# Patient Record
Sex: FEMALE | Race: WHITE | NOT HISPANIC OR LATINO
[De-identification: names, ages, dates, MRNs, and addresses within clinical notes are randomized per-mention and may not be internally consistent; named-entity substitution may affect disease eponyms.]

---

## 2017-03-07 PROBLEM — Z00.00 ENCOUNTER FOR PREVENTIVE HEALTH EXAMINATION: Status: ACTIVE | Noted: 2017-03-07

## 2017-03-14 ENCOUNTER — APPOINTMENT (OUTPATIENT)
Dept: BREAST CENTER | Facility: CLINIC | Age: 42
End: 2017-03-14

## 2017-11-28 ENCOUNTER — OUTPATIENT (OUTPATIENT)
Dept: OUTPATIENT SERVICES | Facility: HOSPITAL | Age: 42
LOS: 1 days | Discharge: HOME | End: 2017-11-28

## 2017-11-28 DIAGNOSIS — Z01.818 ENCOUNTER FOR OTHER PREPROCEDURAL EXAMINATION: ICD-10-CM

## 2017-12-12 ENCOUNTER — OUTPATIENT (OUTPATIENT)
Dept: OUTPATIENT SERVICES | Facility: HOSPITAL | Age: 42
LOS: 1 days | Discharge: HOME | End: 2017-12-12

## 2017-12-19 DIAGNOSIS — D25.9 LEIOMYOMA OF UTERUS, UNSPECIFIED: ICD-10-CM

## 2018-06-12 ENCOUNTER — OUTPATIENT (OUTPATIENT)
Dept: OUTPATIENT SERVICES | Facility: HOSPITAL | Age: 43
LOS: 1 days | Discharge: HOME | End: 2018-06-12

## 2018-06-12 DIAGNOSIS — M25.9 JOINT DISORDER, UNSPECIFIED: ICD-10-CM

## 2018-06-22 ENCOUNTER — OUTPATIENT (OUTPATIENT)
Dept: OUTPATIENT SERVICES | Facility: HOSPITAL | Age: 43
LOS: 1 days | Discharge: HOME | End: 2018-06-22

## 2018-06-22 ENCOUNTER — APPOINTMENT (OUTPATIENT)
Dept: GYNECOLOGIC ONCOLOGY | Facility: CLINIC | Age: 43
End: 2018-06-22

## 2018-06-22 VITALS
SYSTOLIC BLOOD PRESSURE: 140 MMHG | WEIGHT: 245 LBS | TEMPERATURE: 96.7 F | HEIGHT: 65 IN | RESPIRATION RATE: 14 BRPM | BODY MASS INDEX: 40.82 KG/M2 | DIASTOLIC BLOOD PRESSURE: 90 MMHG | HEART RATE: 78 BPM

## 2018-06-25 DIAGNOSIS — D25.9 LEIOMYOMA OF UTERUS, UNSPECIFIED: ICD-10-CM

## 2018-07-10 ENCOUNTER — OUTPATIENT (OUTPATIENT)
Dept: OUTPATIENT SERVICES | Facility: HOSPITAL | Age: 43
LOS: 1 days | Discharge: HOME | End: 2018-07-10

## 2018-07-10 VITALS
WEIGHT: 249.12 LBS | OXYGEN SATURATION: 96 % | HEIGHT: 65 IN | RESPIRATION RATE: 18 BRPM | HEART RATE: 100 BPM | TEMPERATURE: 98 F | DIASTOLIC BLOOD PRESSURE: 82 MMHG | SYSTOLIC BLOOD PRESSURE: 130 MMHG

## 2018-07-10 DIAGNOSIS — Z90.12 ACQUIRED ABSENCE OF LEFT BREAST AND NIPPLE: Chronic | ICD-10-CM

## 2018-07-10 DIAGNOSIS — Z01.818 ENCOUNTER FOR OTHER PREPROCEDURAL EXAMINATION: ICD-10-CM

## 2018-07-10 DIAGNOSIS — D25.9 LEIOMYOMA OF UTERUS, UNSPECIFIED: ICD-10-CM

## 2018-07-10 DIAGNOSIS — Z98.890 OTHER SPECIFIED POSTPROCEDURAL STATES: Chronic | ICD-10-CM

## 2018-07-10 LAB
ALBUMIN SERPL ELPH-MCNC: 4.3 G/DL — SIGNIFICANT CHANGE UP (ref 3.5–5.2)
ALP SERPL-CCNC: 95 U/L — SIGNIFICANT CHANGE UP (ref 30–115)
ALT FLD-CCNC: 21 U/L — SIGNIFICANT CHANGE UP (ref 0–41)
ANION GAP SERPL CALC-SCNC: 15 MMOL/L — HIGH (ref 7–14)
APPEARANCE UR: (no result)
APTT BLD: 40.9 SEC — HIGH (ref 27–39.2)
AST SERPL-CCNC: 17 U/L — SIGNIFICANT CHANGE UP (ref 0–41)
BASOPHILS # BLD AUTO: 0.02 K/UL — SIGNIFICANT CHANGE UP (ref 0–0.2)
BASOPHILS NFR BLD AUTO: 0.3 % — SIGNIFICANT CHANGE UP (ref 0–1)
BILIRUB SERPL-MCNC: 0.3 MG/DL — SIGNIFICANT CHANGE UP (ref 0.2–1.2)
BILIRUB UR-MCNC: (no result)
BLD GP AB SCN SERPL QL: SIGNIFICANT CHANGE UP
BUN SERPL-MCNC: 21 MG/DL — HIGH (ref 10–20)
CALCIUM SERPL-MCNC: 10.7 MG/DL — HIGH (ref 8.5–10.1)
CHLORIDE SERPL-SCNC: 105 MMOL/L — SIGNIFICANT CHANGE UP (ref 98–110)
CO2 SERPL-SCNC: 24 MMOL/L — SIGNIFICANT CHANGE UP (ref 17–32)
COLOR SPEC: (no result)
CREAT SERPL-MCNC: 0.7 MG/DL — SIGNIFICANT CHANGE UP (ref 0.7–1.5)
DIFF PNL FLD: (no result)
EOSINOPHIL # BLD AUTO: 0.06 K/UL — SIGNIFICANT CHANGE UP (ref 0–0.7)
EOSINOPHIL NFR BLD AUTO: 1 % — SIGNIFICANT CHANGE UP (ref 0–8)
EPI CELLS # UR: (no result) /HPF
GLUCOSE SERPL-MCNC: 67 MG/DL — LOW (ref 70–99)
GLUCOSE UR QL: NEGATIVE MG/DL — SIGNIFICANT CHANGE UP
HCT VFR BLD CALC: 48.2 % — HIGH (ref 37–47)
HGB BLD-MCNC: 15.7 G/DL — SIGNIFICANT CHANGE UP (ref 12–16)
IMM GRANULOCYTES NFR BLD AUTO: 0.3 % — SIGNIFICANT CHANGE UP (ref 0.1–0.3)
INR BLD: 1.07 RATIO — SIGNIFICANT CHANGE UP (ref 0.65–1.3)
KETONES UR-MCNC: (no result)
LEUKOCYTE ESTERASE UR-ACNC: (no result)
LYMPHOCYTES # BLD AUTO: 1.03 K/UL — LOW (ref 1.2–3.4)
LYMPHOCYTES # BLD AUTO: 17 % — LOW (ref 20.5–51.1)
MCHC RBC-ENTMCNC: 28.8 PG — SIGNIFICANT CHANGE UP (ref 27–31)
MCHC RBC-ENTMCNC: 32.6 G/DL — SIGNIFICANT CHANGE UP (ref 32–37)
MCV RBC AUTO: 88.4 FL — SIGNIFICANT CHANGE UP (ref 81–99)
MONOCYTES # BLD AUTO: 0.48 K/UL — SIGNIFICANT CHANGE UP (ref 0.1–0.6)
MONOCYTES NFR BLD AUTO: 7.9 % — SIGNIFICANT CHANGE UP (ref 1.7–9.3)
NEUTROPHILS # BLD AUTO: 4.44 K/UL — SIGNIFICANT CHANGE UP (ref 1.4–6.5)
NEUTROPHILS NFR BLD AUTO: 73.5 % — SIGNIFICANT CHANGE UP (ref 42.2–75.2)
NITRITE UR-MCNC: NEGATIVE — SIGNIFICANT CHANGE UP
NRBC # BLD: 0 /100 WBCS — SIGNIFICANT CHANGE UP (ref 0–0)
PH UR: 5.5 — SIGNIFICANT CHANGE UP (ref 5–8)
PLATELET # BLD AUTO: 195 K/UL — SIGNIFICANT CHANGE UP (ref 130–400)
POTASSIUM SERPL-MCNC: 4.7 MMOL/L — SIGNIFICANT CHANGE UP (ref 3.5–5)
POTASSIUM SERPL-SCNC: 4.7 MMOL/L — SIGNIFICANT CHANGE UP (ref 3.5–5)
PROT SERPL-MCNC: 7 G/DL — SIGNIFICANT CHANGE UP (ref 6–8)
PROT UR-MCNC: 100 MG/DL
PROTHROM AB SERPL-ACNC: 11.6 SEC — SIGNIFICANT CHANGE UP (ref 9.95–12.87)
RBC # BLD: 5.45 M/UL — HIGH (ref 4.2–5.4)
RBC # FLD: 13.5 % — SIGNIFICANT CHANGE UP (ref 11.5–14.5)
RBC CASTS # UR COMP ASSIST: >50 /HPF
SODIUM SERPL-SCNC: 144 MMOL/L — SIGNIFICANT CHANGE UP (ref 135–146)
SP GR SPEC: 1.02 — SIGNIFICANT CHANGE UP (ref 1.01–1.03)
TYPE + AB SCN PNL BLD: SIGNIFICANT CHANGE UP
UROBILINOGEN FLD QL: 0.2 MG/DL — SIGNIFICANT CHANGE UP (ref 0.2–0.2)
WBC # BLD: 6.05 K/UL — SIGNIFICANT CHANGE UP (ref 4.8–10.8)
WBC # FLD AUTO: 6.05 K/UL — SIGNIFICANT CHANGE UP (ref 4.8–10.8)
WBC UR QL: SIGNIFICANT CHANGE UP /HPF

## 2018-07-10 NOTE — H&P PST ADULT - REASON FOR ADMISSION
42 year old female here for total abdominal hysterectomy, bilateral salpingectomy, possible bilateral oopherectomy, possible staging  pt states + large abdomen, + heavy menses, has multiple fibroids on uterus needs procedure  pt denies cp, sob, bowers or palpitations  pt denies urinary frequency, urgency or burning  ex trey 2 fos

## 2018-07-10 NOTE — H&P PST ADULT - NSANTHOSAYNRD_GEN_A_CORE
No. PRAVEEN screening performed.  STOP BANG Legend: 0-2 = LOW Risk; 3-4 = INTERMEDIATE Risk; 5-8 = HIGH Risk

## 2018-07-10 NOTE — H&P PST ADULT - FAMILY HISTORY
Father  Still living? Yes, Estimated age: 61-70  Family history of prostate cancer in father, Age at diagnosis: 61-70

## 2018-07-17 PROBLEM — D25.9 LEIOMYOMA OF UTERUS, UNSPECIFIED: Chronic | Status: ACTIVE | Noted: 2018-07-10

## 2018-07-17 PROBLEM — M19.90 UNSPECIFIED OSTEOARTHRITIS, UNSPECIFIED SITE: Chronic | Status: ACTIVE | Noted: 2018-07-10

## 2018-07-17 PROBLEM — C50.919 MALIGNANT NEOPLASM OF UNSPECIFIED SITE OF UNSPECIFIED FEMALE BREAST: Chronic | Status: ACTIVE | Noted: 2018-07-10

## 2018-07-17 PROBLEM — E66.9 OBESITY, UNSPECIFIED: Chronic | Status: ACTIVE | Noted: 2018-07-10

## 2018-07-20 ENCOUNTER — RESULT REVIEW (OUTPATIENT)
Age: 43
End: 2018-07-20

## 2018-07-20 ENCOUNTER — INPATIENT (INPATIENT)
Facility: HOSPITAL | Age: 43
LOS: 11 days | Discharge: HOME | End: 2018-08-01
Attending: OBSTETRICS & GYNECOLOGY | Admitting: OBSTETRICS & GYNECOLOGY
Payer: COMMERCIAL

## 2018-07-20 VITALS
HEART RATE: 854 BPM | DIASTOLIC BLOOD PRESSURE: 91 MMHG | SYSTOLIC BLOOD PRESSURE: 180 MMHG | RESPIRATION RATE: 18 BRPM | TEMPERATURE: 98 F

## 2018-07-20 DIAGNOSIS — Z98.890 OTHER SPECIFIED POSTPROCEDURAL STATES: Chronic | ICD-10-CM

## 2018-07-20 DIAGNOSIS — L76.32 POSTPROCEDURAL HEMATOMA OF SKIN AND SUBCUTANEOUS TISSUE FOLLOWING OTHER PROCEDURE: ICD-10-CM

## 2018-07-20 DIAGNOSIS — Z90.12 ACQUIRED ABSENCE OF LEFT BREAST AND NIPPLE: Chronic | ICD-10-CM

## 2018-07-20 LAB — ABO RH CONFIRMATION: SIGNIFICANT CHANGE UP

## 2018-07-20 RX ORDER — SODIUM CHLORIDE 9 MG/ML
1000 INJECTION, SOLUTION INTRAVENOUS
Qty: 0 | Refills: 0 | Status: DISCONTINUED | OUTPATIENT
Start: 2018-07-20 | End: 2018-07-20

## 2018-07-20 RX ORDER — ACETAMINOPHEN 500 MG
650 TABLET ORAL EVERY 6 HOURS
Qty: 0 | Refills: 0 | Status: DISCONTINUED | OUTPATIENT
Start: 2018-07-20 | End: 2018-07-22

## 2018-07-20 RX ORDER — SODIUM CHLORIDE 9 MG/ML
1000 INJECTION, SOLUTION INTRAVENOUS
Qty: 0 | Refills: 0 | Status: DISCONTINUED | OUTPATIENT
Start: 2018-07-20 | End: 2018-07-21

## 2018-07-20 RX ORDER — HEPARIN SODIUM 5000 [USP'U]/ML
5000 INJECTION INTRAVENOUS; SUBCUTANEOUS EVERY 12 HOURS
Qty: 0 | Refills: 0 | Status: DISCONTINUED | OUTPATIENT
Start: 2018-07-20 | End: 2018-07-21

## 2018-07-20 RX ORDER — HEPARIN SODIUM 5000 [USP'U]/ML
5000 INJECTION INTRAVENOUS; SUBCUTANEOUS ONCE
Qty: 0 | Refills: 0 | Status: COMPLETED | OUTPATIENT
Start: 2018-07-20 | End: 2018-07-20

## 2018-07-20 RX ORDER — MORPHINE SULFATE 50 MG/1
30 CAPSULE, EXTENDED RELEASE ORAL
Qty: 0 | Refills: 0 | Status: DISCONTINUED | OUTPATIENT
Start: 2018-07-20 | End: 2018-07-22

## 2018-07-20 RX ORDER — NALOXONE HYDROCHLORIDE 4 MG/.1ML
0.1 SPRAY NASAL
Qty: 0 | Refills: 0 | Status: DISCONTINUED | OUTPATIENT
Start: 2018-07-20 | End: 2018-07-22

## 2018-07-20 RX ORDER — MORPHINE SULFATE 50 MG/1
4 CAPSULE, EXTENDED RELEASE ORAL
Qty: 0 | Refills: 0 | Status: DISCONTINUED | OUTPATIENT
Start: 2018-07-20 | End: 2018-07-20

## 2018-07-20 RX ORDER — ONDANSETRON 8 MG/1
4 TABLET, FILM COATED ORAL EVERY 4 HOURS
Qty: 0 | Refills: 0 | Status: DISCONTINUED | OUTPATIENT
Start: 2018-07-20 | End: 2018-07-21

## 2018-07-20 RX ORDER — ENOXAPARIN SODIUM 100 MG/ML
40 INJECTION SUBCUTANEOUS EVERY 24 HOURS
Qty: 0 | Refills: 0 | Status: DISCONTINUED | OUTPATIENT
Start: 2018-07-20 | End: 2018-07-20

## 2018-07-20 RX ORDER — ONDANSETRON 8 MG/1
4 TABLET, FILM COATED ORAL ONCE
Qty: 0 | Refills: 0 | Status: DISCONTINUED | OUTPATIENT
Start: 2018-07-20 | End: 2018-07-20

## 2018-07-20 RX ORDER — DOCUSATE SODIUM 100 MG
100 CAPSULE ORAL
Qty: 0 | Refills: 0 | Status: DISCONTINUED | OUTPATIENT
Start: 2018-07-20 | End: 2018-07-28

## 2018-07-20 RX ORDER — ONDANSETRON 8 MG/1
4 TABLET, FILM COATED ORAL EVERY 6 HOURS
Qty: 0 | Refills: 0 | Status: DISCONTINUED | OUTPATIENT
Start: 2018-07-20 | End: 2018-07-21

## 2018-07-20 RX ORDER — MORPHINE SULFATE 50 MG/1
2 CAPSULE, EXTENDED RELEASE ORAL
Qty: 0 | Refills: 0 | Status: DISCONTINUED | OUTPATIENT
Start: 2018-07-20 | End: 2018-07-20

## 2018-07-20 RX ORDER — SIMETHICONE 80 MG/1
80 TABLET, CHEWABLE ORAL EVERY 6 HOURS
Qty: 0 | Refills: 0 | Status: DISCONTINUED | OUTPATIENT
Start: 2018-07-20 | End: 2018-08-01

## 2018-07-20 RX ORDER — DIPHENHYDRAMINE HCL 50 MG
25 CAPSULE ORAL EVERY 4 HOURS
Qty: 0 | Refills: 0 | Status: DISCONTINUED | OUTPATIENT
Start: 2018-07-20 | End: 2018-08-01

## 2018-07-20 RX ORDER — SODIUM CHLORIDE 9 MG/ML
500 INJECTION, SOLUTION INTRAVENOUS
Qty: 0 | Refills: 0 | Status: DISCONTINUED | OUTPATIENT
Start: 2018-07-20 | End: 2018-07-20

## 2018-07-20 RX ADMIN — MORPHINE SULFATE 2 MILLIGRAM(S): 50 CAPSULE, EXTENDED RELEASE ORAL at 18:00

## 2018-07-20 RX ADMIN — HEPARIN SODIUM 5000 UNIT(S): 5000 INJECTION INTRAVENOUS; SUBCUTANEOUS at 11:41

## 2018-07-20 RX ADMIN — HEPARIN SODIUM 5000 UNIT(S): 5000 INJECTION INTRAVENOUS; SUBCUTANEOUS at 21:59

## 2018-07-20 RX ADMIN — MORPHINE SULFATE 2 MILLIGRAM(S): 50 CAPSULE, EXTENDED RELEASE ORAL at 17:03

## 2018-07-20 RX ADMIN — MORPHINE SULFATE 4 MILLIGRAM(S): 50 CAPSULE, EXTENDED RELEASE ORAL at 16:52

## 2018-07-20 RX ADMIN — SODIUM CHLORIDE 150 MILLILITER(S): 9 INJECTION, SOLUTION INTRAVENOUS at 16:33

## 2018-07-20 RX ADMIN — SODIUM CHLORIDE 100 MILLILITER(S): 9 INJECTION, SOLUTION INTRAVENOUS at 17:00

## 2018-07-20 RX ADMIN — MORPHINE SULFATE 2 MILLIGRAM(S): 50 CAPSULE, EXTENDED RELEASE ORAL at 16:40

## 2018-07-20 RX ADMIN — MORPHINE SULFATE 30 MILLILITER(S): 50 CAPSULE, EXTENDED RELEASE ORAL at 17:07

## 2018-07-20 RX ADMIN — MORPHINE SULFATE 4 MILLIGRAM(S): 50 CAPSULE, EXTENDED RELEASE ORAL at 18:00

## 2018-07-20 NOTE — PROGRESS NOTE ADULT - ASSESSMENT
42y P0 h/o breast cancer 2017, with symptomatic large fibroid uterus s/p ZOILA-BSO, doing well  - F/u UO  - F/u stat labs  - Pain mgt prn  - Diet as tolerated  - Bedrest   - Heparin for anticoagulation    Dr. Reed to be made aware 42y P0 h/o breast cancer 2017, with symptomatic large fibroid uterus s/p ZOILA-BSO, doing well  - F/u UO  - F/u stat postop labs (not drawn by rounds due to IV at right arm and left arm precautions)  - Pain mgt prn  - Diet as tolerated  - Bedrest   - Heparin for anticoagulation    Dr. Reed to be made aware

## 2018-07-20 NOTE — PROGRESS NOTE ADULT - SUBJECTIVE AND OBJECTIVE BOX
PGY 3 Note:    Pain well controlled at this time. No complaints at this time. Has not passed gas yet, denies CB, SOB, nausea, vomiting.     T(C): 35.8 (07-20-18 @ 18:40), Max: 36.7 (07-20-18 @ 10:33)  HR: 97 (07-20-18 @ 18:40) (82 - 854)  BP: 111/53 (07-20-18 @ 18:40) (111/53 - 180/91)  RR: 18 (07-20-18 @ 18:40) (16 - 25)  SpO2: 99% (07-20-18 @ 18:26) (97% - 99%)    07-20-18 @ 07:01  -  07-20-18 @ 23:13  --------------------------------------------------------  IN: 0 mL / OUT: 420 mL / NET: -420 mL -> 60cc/hr (min 40cc/hr)    Gen: NAD, A&Ox3  Heart: S1S2,RRR  Lungs: CTABL  Abd: obese, mildly distended, vertical bandage not saturated, appropriately tender to palpation, +hypoactive BS  VE: Deferred  Ext: SCDs, no edema or calf tenderness bilaterally    Labs:  Pre-op:   6.05>15.7/48.2<195   144/4.7/105/24/21/0.7<67    Medications:  acetaminophen   Tablet 650 milliGRAM(s) Oral every 6 hours PRN  diphenhydrAMINE   Capsule 25 milliGRAM(s) Oral every 4 hours PRN  docusate sodium 100 milliGRAM(s) Oral two times a day  heparin  Injectable 5000 Unit(s) SubCutaneous every 12 hours  lactated ringers. 1000 milliLiter(s) IV Continuous <Continuous>  morphine PCA (5 mG/mL) 30 milliLiter(s) PCA Continuous PCA Continuous  naloxone Injectable 0.1 milliGRAM(s) IV Push every 3 minutes PRN  ondansetron Injectable 4 milliGRAM(s) IV Push every 6 hours PRN  ondansetron Injectable 4 milliGRAM(s) IV Push every 4 hours PRN  simethicone 80 milliGRAM(s) Chew every 6 hours PRN PGY 3 Note:    Pain well controlled at this time. No complaints at this time. Has not passed gas yet, denies CB, SOB, nausea, vomiting.     T(C): 35.8 (07-20-18 @ 18:40), Max: 36.7 (07-20-18 @ 10:33)  HR: 97 (07-20-18 @ 18:40) (82 - 854)  BP: 111/53 (07-20-18 @ 18:40) (111/53 - 180/91)  RR: 18 (07-20-18 @ 18:40) (16 - 25)  SpO2: 99% (07-20-18 @ 18:26) (97% - 99%)    07-20-18 @ 07:01  -  07-20-18 @ 23:13  --------------------------------------------------------  IN: 0 mL / OUT: 420 mL / NET: -420 mL -> 60cc/hr (min 40cc/hr)    Gen: NAD, A&Ox3  Heart: S1S2,RRR  Lungs: CTABL  Abd: obese, mildly distended, vertical bandage not saturated, appropriately tender to palpation, +hypoactive BS  VE: Deferred ,no active bleeding at vaginal area  Ext: SCDs, tr pedal edema, no calf tenderness bilaterally    Labs:  Pre-op:   6.05>15.7/48.2<195   144/4.7/105/24/21/0.7<67    Medications:  acetaminophen   Tablet 650 milliGRAM(s) Oral every 6 hours PRN  diphenhydrAMINE   Capsule 25 milliGRAM(s) Oral every 4 hours PRN  docusate sodium 100 milliGRAM(s) Oral two times a day  heparin  Injectable 5000 Unit(s) SubCutaneous every 12 hours  lactated ringers. 1000 milliLiter(s) IV Continuous <Continuous>  morphine PCA (5 mG/mL) 30 milliLiter(s) PCA Continuous PCA Continuous  naloxone Injectable 0.1 milliGRAM(s) IV Push every 3 minutes PRN  ondansetron Injectable 4 milliGRAM(s) IV Push every 6 hours PRN  ondansetron Injectable 4 milliGRAM(s) IV Push every 4 hours PRN  simethicone 80 milliGRAM(s) Chew every 6 hours PRN

## 2018-07-20 NOTE — CHART NOTE - NSCHARTNOTEFT_GEN_A_CORE
PACU ANESTHESIA ADMISSION NOTE      Procedure: Total abdominal hysterectomy and bilateral salpingo-oophorectomy  Lysis of adhesions    Post op diagnosis:  Fibroid uterus      ____  Intubated  TV:______       Rate: ______      FiO2: ______    _x___  Patent Airway    ____  Full return of protective reflexes    __x__  Full recovery from anesthesia / back to baseline status    Vitals:  T(F): 97.4  HR: 86  BP: 169/66  RR: 18  SpO2: 98%    Mental Status:  __x__ Awake   __x___ Alert   _____ Drowsy   _____ Sedated    Nausea/Vomiting:  _x___ NO  ______Yes,   See Post - Op Orders          Pain Scale (0-10):  _____    Treatment: ____ None    ___x_ See Post - Op/PCA Orders    Post - Operative Fluids:   ____ Oral   ___x_ See Post - Op Orders    Plan: Discharge:   ____Home       ___x__Floor     _____Critical Care    _____  Other:_________________    Comments: Patient tolerated the procedure  Transferred to PACU  No anesthesia complications  Respirations normal

## 2018-07-20 NOTE — ASU PATIENT PROFILE, ADULT - TEACHING/LEARNING FACTORS INFLUENCE READINESS TO LEARN
----- Message from [id number]  Delonte HORNE MD sent at 9/20/2017  3:59 PM CDT -----  plz add beside the abnormal TSH before   He noted to have subclinical b12 deficiency , despite normal b12 , but methmalonic is high   Need B12 1000 mcg IM every week for 8 weeks , and then once a month im  , with b12 oral 500 mcg po daily after 8 weeks none

## 2018-07-20 NOTE — BRIEF OPERATIVE NOTE - PROCEDURE
<<-----Click on this checkbox to enter Procedure Lysis of adhesions  07/20/2018    Active  MMIRANDASI  Total abdominal hysterectomy and bilateral salpingo-oophorectomy  07/20/2018    Active  MMIRANDASI

## 2018-07-20 NOTE — BRIEF OPERATIVE NOTE - OPERATION/FINDINGS
Large 30w size uterus with multiple fibroids (intramural, subserosal, pedunculated). Normal tubes and ovaries. Adhesion of the small bowel mesentery to a posterior pedunculated fibroid. Normal liver, gallbladder.

## 2018-07-21 LAB
ANION GAP SERPL CALC-SCNC: 11 MMOL/L — SIGNIFICANT CHANGE UP (ref 7–14)
ANION GAP SERPL CALC-SCNC: 12 MMOL/L — SIGNIFICANT CHANGE UP (ref 7–14)
ANION GAP SERPL CALC-SCNC: 12 MMOL/L — SIGNIFICANT CHANGE UP (ref 7–14)
ANION GAP SERPL CALC-SCNC: 13 MMOL/L — SIGNIFICANT CHANGE UP (ref 7–14)
ANION GAP SERPL CALC-SCNC: 16 MMOL/L — HIGH (ref 7–14)
APTT BLD: 33 SEC — SIGNIFICANT CHANGE UP (ref 27–39.2)
APTT BLD: 45.5 SEC — HIGH (ref 27–39.2)
BASE EXCESS BLDA CALC-SCNC: -3.6 MMOL/L — LOW (ref -2–2)
BLD GP AB SCN SERPL QL: SIGNIFICANT CHANGE UP
BUN SERPL-MCNC: 21 MG/DL — HIGH (ref 10–20)
BUN SERPL-MCNC: 25 MG/DL — HIGH (ref 10–20)
BUN SERPL-MCNC: 25 MG/DL — HIGH (ref 10–20)
BUN SERPL-MCNC: 26 MG/DL — HIGH (ref 10–20)
BUN SERPL-MCNC: 26 MG/DL — HIGH (ref 10–20)
CALCIUM SERPL-MCNC: 8.1 MG/DL — LOW (ref 8.5–10.1)
CALCIUM SERPL-MCNC: 8.1 MG/DL — LOW (ref 8.5–10.1)
CALCIUM SERPL-MCNC: 8.2 MG/DL — LOW (ref 8.5–10.1)
CALCIUM SERPL-MCNC: 8.4 MG/DL — LOW (ref 8.5–10.1)
CALCIUM SERPL-MCNC: 9 MG/DL — SIGNIFICANT CHANGE UP (ref 8.5–10.1)
CHLORIDE SERPL-SCNC: 101 MMOL/L — SIGNIFICANT CHANGE UP (ref 98–110)
CHLORIDE SERPL-SCNC: 102 MMOL/L — SIGNIFICANT CHANGE UP (ref 98–110)
CHLORIDE SERPL-SCNC: 102 MMOL/L — SIGNIFICANT CHANGE UP (ref 98–110)
CHLORIDE SERPL-SCNC: 104 MMOL/L — SIGNIFICANT CHANGE UP (ref 98–110)
CHLORIDE SERPL-SCNC: 105 MMOL/L — SIGNIFICANT CHANGE UP (ref 98–110)
CK MB CFR SERPL CALC: 2.2 NG/ML — SIGNIFICANT CHANGE UP (ref 0.6–6.3)
CK SERPL-CCNC: 101 U/L — SIGNIFICANT CHANGE UP (ref 0–225)
CK SERPL-CCNC: 72 U/L — SIGNIFICANT CHANGE UP (ref 0–225)
CO2 SERPL-SCNC: 18 MMOL/L — SIGNIFICANT CHANGE UP (ref 17–32)
CO2 SERPL-SCNC: 19 MMOL/L — SIGNIFICANT CHANGE UP (ref 17–32)
CO2 SERPL-SCNC: 20 MMOL/L — SIGNIFICANT CHANGE UP (ref 17–32)
CO2 SERPL-SCNC: 21 MMOL/L — SIGNIFICANT CHANGE UP (ref 17–32)
CO2 SERPL-SCNC: 22 MMOL/L — SIGNIFICANT CHANGE UP (ref 17–32)
CREAT SERPL-MCNC: 0.9 MG/DL — SIGNIFICANT CHANGE UP (ref 0.7–1.5)
CREAT SERPL-MCNC: 1 MG/DL — SIGNIFICANT CHANGE UP (ref 0.7–1.5)
CREAT SERPL-MCNC: 1.1 MG/DL — SIGNIFICANT CHANGE UP (ref 0.7–1.5)
CREAT SERPL-MCNC: 1.1 MG/DL — SIGNIFICANT CHANGE UP (ref 0.7–1.5)
CREAT SERPL-MCNC: 1.5 MG/DL — SIGNIFICANT CHANGE UP (ref 0.7–1.5)
FIBRINOGEN PPP-MCNC: 613 MG/DL — HIGH (ref 204.4–570.6)
GAS PNL BLDA: SIGNIFICANT CHANGE UP
GLUCOSE SERPL-MCNC: 109 MG/DL — HIGH (ref 70–99)
GLUCOSE SERPL-MCNC: 110 MG/DL — HIGH (ref 70–99)
GLUCOSE SERPL-MCNC: 135 MG/DL — HIGH (ref 70–99)
GLUCOSE SERPL-MCNC: 142 MG/DL — HIGH (ref 70–99)
GLUCOSE SERPL-MCNC: 163 MG/DL — HIGH (ref 70–99)
HCO3 BLDA-SCNC: 22 MMOL/L — LOW (ref 23–27)
HCT VFR BLD CALC: 25.5 % — LOW (ref 37–47)
HCT VFR BLD CALC: 26.2 % — LOW (ref 37–47)
HCT VFR BLD CALC: 36.9 % — LOW (ref 37–47)
HCT VFR BLD CALC: 43.3 % — SIGNIFICANT CHANGE UP (ref 37–47)
HGB BLD-MCNC: 11.9 G/DL — LOW (ref 12–16)
HGB BLD-MCNC: 14.3 G/DL — SIGNIFICANT CHANGE UP (ref 12–16)
HGB BLD-MCNC: 8.6 G/DL — LOW (ref 12–16)
HGB BLD-MCNC: 8.8 G/DL — LOW (ref 12–16)
HOROWITZ INDEX BLDA+IHG-RTO: 32 — SIGNIFICANT CHANGE UP
INR BLD: 1.14 RATIO — SIGNIFICANT CHANGE UP (ref 0.65–1.3)
INR BLD: 1.33 RATIO — HIGH (ref 0.65–1.3)
MAGNESIUM SERPL-MCNC: 1.7 MG/DL — LOW (ref 1.8–2.4)
MAGNESIUM SERPL-MCNC: 1.9 MG/DL — SIGNIFICANT CHANGE UP (ref 1.8–2.4)
MCHC RBC-ENTMCNC: 28.9 PG — SIGNIFICANT CHANGE UP (ref 27–31)
MCHC RBC-ENTMCNC: 29.2 PG — SIGNIFICANT CHANGE UP (ref 27–31)
MCHC RBC-ENTMCNC: 29.2 PG — SIGNIFICANT CHANGE UP (ref 27–31)
MCHC RBC-ENTMCNC: 29.3 PG — SIGNIFICANT CHANGE UP (ref 27–31)
MCHC RBC-ENTMCNC: 32.2 G/DL — SIGNIFICANT CHANGE UP (ref 32–37)
MCHC RBC-ENTMCNC: 33 G/DL — SIGNIFICANT CHANGE UP (ref 32–37)
MCHC RBC-ENTMCNC: 33.6 G/DL — SIGNIFICANT CHANGE UP (ref 32–37)
MCHC RBC-ENTMCNC: 33.7 G/DL — SIGNIFICANT CHANGE UP (ref 32–37)
MCV RBC AUTO: 86.4 FL — SIGNIFICANT CHANGE UP (ref 81–99)
MCV RBC AUTO: 87.3 FL — SIGNIFICANT CHANGE UP (ref 81–99)
MCV RBC AUTO: 87.5 FL — SIGNIFICANT CHANGE UP (ref 81–99)
MCV RBC AUTO: 90.7 FL — SIGNIFICANT CHANGE UP (ref 81–99)
NRBC # BLD: 0 /100 WBCS — SIGNIFICANT CHANGE UP (ref 0–0)
PCO2 BLDA: 44 MMHG — HIGH (ref 38–42)
PH BLDA: 7.32 — LOW (ref 7.38–7.42)
PHOSPHATE SERPL-MCNC: 3.8 MG/DL — SIGNIFICANT CHANGE UP (ref 2.1–4.9)
PLATELET # BLD AUTO: 191 K/UL — SIGNIFICANT CHANGE UP (ref 130–400)
PLATELET # BLD AUTO: 200 K/UL — SIGNIFICANT CHANGE UP (ref 130–400)
PLATELET # BLD AUTO: 316 K/UL — SIGNIFICANT CHANGE UP (ref 130–400)
PLATELET # BLD AUTO: 363 K/UL — SIGNIFICANT CHANGE UP (ref 130–400)
PO2 BLDA: 88 MMHG — SIGNIFICANT CHANGE UP (ref 78–95)
POTASSIUM SERPL-MCNC: 4.5 MMOL/L — SIGNIFICANT CHANGE UP (ref 3.5–5)
POTASSIUM SERPL-MCNC: 4.6 MMOL/L — SIGNIFICANT CHANGE UP (ref 3.5–5)
POTASSIUM SERPL-MCNC: 4.9 MMOL/L — SIGNIFICANT CHANGE UP (ref 3.5–5)
POTASSIUM SERPL-MCNC: 6.3 MMOL/L — CRITICAL HIGH (ref 3.5–5)
POTASSIUM SERPL-MCNC: 6.6 MMOL/L — CRITICAL HIGH (ref 3.5–5)
POTASSIUM SERPL-SCNC: 4.5 MMOL/L — SIGNIFICANT CHANGE UP (ref 3.5–5)
POTASSIUM SERPL-SCNC: 4.6 MMOL/L — SIGNIFICANT CHANGE UP (ref 3.5–5)
POTASSIUM SERPL-SCNC: 4.9 MMOL/L — SIGNIFICANT CHANGE UP (ref 3.5–5)
POTASSIUM SERPL-SCNC: 6.3 MMOL/L — CRITICAL HIGH (ref 3.5–5)
POTASSIUM SERPL-SCNC: 6.6 MMOL/L — CRITICAL HIGH (ref 3.5–5)
PROTHROM AB SERPL-ACNC: 12.3 SEC — SIGNIFICANT CHANGE UP (ref 9.95–12.87)
PROTHROM AB SERPL-ACNC: 14.3 SEC — HIGH (ref 9.95–12.87)
RBC # BLD: 2.95 M/UL — LOW (ref 4.2–5.4)
RBC # BLD: 3 M/UL — LOW (ref 4.2–5.4)
RBC # BLD: 4.07 M/UL — LOW (ref 4.2–5.4)
RBC # BLD: 4.95 M/UL — SIGNIFICANT CHANGE UP (ref 4.2–5.4)
RBC # FLD: 13.4 % — SIGNIFICANT CHANGE UP (ref 11.5–14.5)
RBC # FLD: 13.6 % — SIGNIFICANT CHANGE UP (ref 11.5–14.5)
RBC # FLD: 13.9 % — SIGNIFICANT CHANGE UP (ref 11.5–14.5)
RBC # FLD: 13.9 % — SIGNIFICANT CHANGE UP (ref 11.5–14.5)
SAO2 % BLDA: 98 % — SIGNIFICANT CHANGE UP (ref 94–98)
SODIUM SERPL-SCNC: 134 MMOL/L — LOW (ref 135–146)
SODIUM SERPL-SCNC: 135 MMOL/L — SIGNIFICANT CHANGE UP (ref 135–146)
SODIUM SERPL-SCNC: 135 MMOL/L — SIGNIFICANT CHANGE UP (ref 135–146)
SODIUM SERPL-SCNC: 136 MMOL/L — SIGNIFICANT CHANGE UP (ref 135–146)
SODIUM SERPL-SCNC: 138 MMOL/L — SIGNIFICANT CHANGE UP (ref 135–146)
TROPONIN T SERPL-MCNC: <0.01 NG/ML — SIGNIFICANT CHANGE UP
TROPONIN T SERPL-MCNC: <0.01 NG/ML — SIGNIFICANT CHANGE UP
TYPE + AB SCN PNL BLD: SIGNIFICANT CHANGE UP
WBC # BLD: 14.99 K/UL — HIGH (ref 4.8–10.8)
WBC # BLD: 19.16 K/UL — HIGH (ref 4.8–10.8)
WBC # BLD: 6.43 K/UL — SIGNIFICANT CHANGE UP (ref 4.8–10.8)
WBC # BLD: 7.51 K/UL — SIGNIFICANT CHANGE UP (ref 4.8–10.8)
WBC # FLD AUTO: 14.99 K/UL — HIGH (ref 4.8–10.8)
WBC # FLD AUTO: 19.16 K/UL — HIGH (ref 4.8–10.8)
WBC # FLD AUTO: 6.43 K/UL — SIGNIFICANT CHANGE UP (ref 4.8–10.8)
WBC # FLD AUTO: 7.51 K/UL — SIGNIFICANT CHANGE UP (ref 4.8–10.8)

## 2018-07-21 PROCEDURE — 93970 EXTREMITY STUDY: CPT | Mod: 26

## 2018-07-21 PROCEDURE — 99291 CRITICAL CARE FIRST HOUR: CPT

## 2018-07-21 RX ORDER — SODIUM CHLORIDE 9 MG/ML
1000 INJECTION, SOLUTION INTRAVENOUS
Qty: 0 | Refills: 0 | Status: DISCONTINUED | OUTPATIENT
Start: 2018-07-21 | End: 2018-07-21

## 2018-07-21 RX ORDER — SODIUM CHLORIDE 9 MG/ML
500 INJECTION INTRAMUSCULAR; INTRAVENOUS; SUBCUTANEOUS ONCE
Qty: 0 | Refills: 0 | Status: COMPLETED | OUTPATIENT
Start: 2018-07-21 | End: 2018-07-21

## 2018-07-21 RX ORDER — SODIUM CHLORIDE 9 MG/ML
1000 INJECTION INTRAMUSCULAR; INTRAVENOUS; SUBCUTANEOUS
Qty: 0 | Refills: 0 | Status: DISCONTINUED | OUTPATIENT
Start: 2018-07-21 | End: 2018-07-22

## 2018-07-21 RX ORDER — CALCIUM GLUCONATE 100 MG/ML
1 VIAL (ML) INTRAVENOUS ONCE
Qty: 0 | Refills: 0 | Status: DISCONTINUED | OUTPATIENT
Start: 2018-07-21 | End: 2018-07-21

## 2018-07-21 RX ORDER — SODIUM CHLORIDE 9 MG/ML
1000 INJECTION INTRAMUSCULAR; INTRAVENOUS; SUBCUTANEOUS
Qty: 0 | Refills: 0 | Status: DISCONTINUED | OUTPATIENT
Start: 2018-07-21 | End: 2018-07-21

## 2018-07-21 RX ORDER — INSULIN HUMAN 100 [IU]/ML
10 INJECTION, SOLUTION SUBCUTANEOUS ONCE
Qty: 0 | Refills: 0 | Status: COMPLETED | OUTPATIENT
Start: 2018-07-21 | End: 2018-07-21

## 2018-07-21 RX ORDER — DEXTROSE 50 % IN WATER 50 %
50 SYRINGE (ML) INTRAVENOUS ONCE
Qty: 0 | Refills: 0 | Status: COMPLETED | OUTPATIENT
Start: 2018-07-21 | End: 2018-07-21

## 2018-07-21 RX ORDER — ONDANSETRON 8 MG/1
4 TABLET, FILM COATED ORAL EVERY 6 HOURS
Qty: 0 | Refills: 0 | Status: DISCONTINUED | OUTPATIENT
Start: 2018-07-21 | End: 2018-08-01

## 2018-07-21 RX ORDER — SODIUM CHLORIDE 9 MG/ML
250 INJECTION INTRAMUSCULAR; INTRAVENOUS; SUBCUTANEOUS ONCE
Qty: 0 | Refills: 0 | Status: COMPLETED | OUTPATIENT
Start: 2018-07-21 | End: 2018-07-21

## 2018-07-21 RX ORDER — PANTOPRAZOLE SODIUM 20 MG/1
40 TABLET, DELAYED RELEASE ORAL DAILY
Qty: 0 | Refills: 0 | Status: DISCONTINUED | OUTPATIENT
Start: 2018-07-21 | End: 2018-08-01

## 2018-07-21 RX ORDER — MAGNESIUM SULFATE 500 MG/ML
2 VIAL (ML) INJECTION ONCE
Qty: 0 | Refills: 0 | Status: COMPLETED | OUTPATIENT
Start: 2018-07-21 | End: 2018-07-21

## 2018-07-21 RX ORDER — CALCIUM GLUCONATE 100 MG/ML
1 VIAL (ML) INTRAVENOUS ONCE
Qty: 0 | Refills: 0 | Status: COMPLETED | OUTPATIENT
Start: 2018-07-21 | End: 2018-07-21

## 2018-07-21 RX ADMIN — SODIUM CHLORIDE 125 MILLILITER(S): 9 INJECTION INTRAMUSCULAR; INTRAVENOUS; SUBCUTANEOUS at 02:03

## 2018-07-21 RX ADMIN — SODIUM CHLORIDE 500 MILLILITER(S): 9 INJECTION INTRAMUSCULAR; INTRAVENOUS; SUBCUTANEOUS at 11:06

## 2018-07-21 RX ADMIN — SODIUM CHLORIDE 1000 MILLILITER(S): 9 INJECTION INTRAMUSCULAR; INTRAVENOUS; SUBCUTANEOUS at 06:45

## 2018-07-21 RX ADMIN — Medication 100 MILLIGRAM(S): at 05:03

## 2018-07-21 RX ADMIN — Medication 50 GRAM(S): at 01:19

## 2018-07-21 RX ADMIN — SODIUM CHLORIDE 750 MILLILITER(S): 9 INJECTION INTRAMUSCULAR; INTRAVENOUS; SUBCUTANEOUS at 19:15

## 2018-07-21 RX ADMIN — Medication 200 GRAM(S): at 04:29

## 2018-07-21 RX ADMIN — MORPHINE SULFATE 30 MILLILITER(S): 50 CAPSULE, EXTENDED RELEASE ORAL at 20:58

## 2018-07-21 RX ADMIN — Medication 50 MILLILITER(S): at 04:41

## 2018-07-21 RX ADMIN — SODIUM CHLORIDE 75 MILLILITER(S): 9 INJECTION INTRAMUSCULAR; INTRAVENOUS; SUBCUTANEOUS at 20:00

## 2018-07-21 RX ADMIN — PANTOPRAZOLE SODIUM 40 MILLIGRAM(S): 20 TABLET, DELAYED RELEASE ORAL at 14:33

## 2018-07-21 RX ADMIN — INSULIN HUMAN 10 UNIT(S): 100 INJECTION, SOLUTION SUBCUTANEOUS at 04:41

## 2018-07-21 RX ADMIN — SODIUM CHLORIDE 1000 MILLILITER(S): 9 INJECTION INTRAMUSCULAR; INTRAVENOUS; SUBCUTANEOUS at 02:48

## 2018-07-21 RX ADMIN — ONDANSETRON 4 MILLIGRAM(S): 8 TABLET, FILM COATED ORAL at 00:39

## 2018-07-21 RX ADMIN — HEPARIN SODIUM 5000 UNIT(S): 5000 INJECTION INTRAVENOUS; SUBCUTANEOUS at 11:05

## 2018-07-21 RX ADMIN — SODIUM CHLORIDE 75 MILLILITER(S): 9 INJECTION INTRAMUSCULAR; INTRAVENOUS; SUBCUTANEOUS at 19:06

## 2018-07-21 NOTE — CHART NOTE - NSCHARTNOTEFT_GEN_A_CORE
Patient transferred to the ICU.  Patient remains alert and oriented, with no complaints  She had one episode of bilious vomiting before transfer to the SICU, but now feels better.  ON physical examination, the abdomen is distended and the surgical dressing was noted to be soaked with blood.  The dressing was changed at the bedside and a pressure dressing applied.   In the [facility] system, the CBC listed at 5:30 am was actually drawn at 2:50 am, at the time of the BMP done at that time. On that blood test, the HGB was 11.9. Venous ABG done at 6:58 am showed HGB of 11.2. Active intraabdominal bleeding unlikely at this time, but will keep monitoring serial cbc.  Hyperkalemia: on venous ABG from 6:58 am, K =5.6.  ARF: Repeat labs pending from this morning. 500mL bolus ordered by the SICU team. Urine output at arrival to SICU was 250mL over the previous 6 hrs.   I spoke to the patient's sister to update her on the clinical situation of the patient since last night, and explained the reason for the transfer to the SICU.

## 2018-07-21 NOTE — CONSULT NOTE ADULT - SUBJECTIVE AND OBJECTIVE BOX
SICU Consultation Note  =====================================================  HPI: 42y Female  HPI: 42 year old female with a PMH of Breast Ca in 2017 now POD 1 s/p Total abdominal hysterectomy and bilateral salpingo-oophorectomy for Large 30w size uterus with multiple fibroids (intramural, subserosal, pedunculated) complicated by post operative renal insufficiency.       Surgery Information  OR time:-      EBL: 400         IV Fluids:  3500     Blood Products: 0  UOP:   250       PAST MEDICAL & SURGICAL HISTORY:  Fibroids  Arthritis  Obese  Breast cancer: left , had surgery, then tamoxifen  History of surgery: fibroid embolization  H/O left mastectomy    Home Meds: Home Medications:  Aspirin 81 oral delayed release tablet: 1 tab(s) orally once a day (at bedtime) (20 Jul 2018 10:35)  tamoxifen 20 mg oral tablet: 1 tab(s) orally once a day (at bedtime) (20 Jul 2018 10:35)    Allergies: Allergies    Naprosyn (Nausea)    Intolerances      Soc:   Advanced Directives: Presumed Full Code     ROS:    REVIEW OF SYSTEMS    [x] A ten-point review of systems was otherwise negative except as noted.  [ ] Due to altered mental status/intubation, subjective information were not able to be obtained from the patient. History was obtained, to the extent possible, from review of the chart and collateral sources of information.      CURRENT MEDICATIONS:   --------------------------------------------------------------------------------------  Neurologic Medications  acetaminophen   Tablet 650 milliGRAM(s) Oral every 6 hours PRN For Temp greater than 38 C (100.4 F)  diphenhydrAMINE   Capsule 25 milliGRAM(s) Oral every 4 hours PRN Rash and/or Itching  morphine PCA (5 mG/mL) 30 milliLiter(s) PCA Continuous PCA Continuous  ondansetron Injectable 4 milliGRAM(s) IV Push every 6 hours PRN Nausea  ondansetron Injectable 4 milliGRAM(s) IV Push every 4 hours PRN Nausea and/or Vomiting    Respiratory Medications    Cardiovascular Medications    Gastrointestinal Medications  dextrose 5% + sodium chloride 0.9%. 1000 milliLiter(s) IV Continuous <Continuous>  docusate sodium 100 milliGRAM(s) Oral two times a day  simethicone 80 milliGRAM(s) Chew every 6 hours PRN Gas    Genitourinary Medications    Hematologic/Oncologic Medications  heparin  Injectable 5000 Unit(s) SubCutaneous every 12 hours    Antimicrobial/Immunologic Medications    Endocrine/Metabolic Medications    Topical/Other Medications  naloxone Injectable 0.1 milliGRAM(s) IV Push every 3 minutes PRN For ANY of the following changes in patient status:  A. RR LESS THAN 10 breaths per minute, B. Oxygen saturation LESS THAN 90%, C. Sedation score of 6    --------------------------------------------------------------------------------------    VITAL SIGNS, INS/OUTS (last 24 hours):  --------------------------------------------------------------------------------------  ICU Vital Signs Last 24 Hrs  T(C): 36.1 (21 Jul 2018 00:00), Max: 36.7 (20 Jul 2018 10:33)  T(F): 97 (21 Jul 2018 00:00), Max: 98 (20 Jul 2018 10:33)  HR: 109 (21 Jul 2018 00:00) (82 - 854)  BP: 82/43 (21 Jul 2018 00:00) (82/43 - 180/91)  BP(mean): --  ABP: --  ABP(mean): --  RR: 18 (21 Jul 2018 00:00) (16 - 25)  SpO2: 97% (21 Jul 2018 00:00) (97% - 99%)    I&O's Summary    20 Jul 2018 07:01  -  21 Jul 2018 05:23  --------------------------------------------------------  IN: 2575 mL / OUT: 420 mL / NET: 2155 mL      --------------------------------------------------------------------------------------    EXAM:  General/Neuro  RASS: 2  GCS: 15  Exam: Normal, NAD, alert, oriented x 3, no focal deficits. PERRLA     Respiratory  Exam: Lungs clear to auscultation, Normal expansion/effort.     Cardiovascular  Exam: S1, S2.  Regular rate and rhythm.  Peripheral edema   Cardiac Rhythm: Normal Sinus Rhythm  ECHO:     GI  Exam: Abdomen soft, Non-tender, Non-distended.     Tubes/Lines/Drains   [x] Peripheral IV  [] Central Venous Line     	[] R	[] L	[] IJ	[] Fem	[] SC        Type:	    Date Placed:   [] Arterial Line		[] R	[] L	[] Fem	[] Rad	[] Ax	Date Placed:   [] PICC:         	[] Midline		[] Mediport           [] Urinary Catheter		Date Placed:     Extremities  Exam: Extremities warm, pink, well-perfused.        Derm:  Exam: Good skin turgor, no skin breakdown.      :   Exam: Garcia catheter in place.     LABS  --------------------------------------------------------------------------------------  Labs:  CAPILLARY BLOOD GLUCOSE  144 (20 Jul 2018 16:33)                              14.3   19.16 )-----------( 363      ( 20 Jul 2018 23:10 )             43.3         07-21    138  |  105  |  25<H>  ----------------------------<  163<H>  6.6<HH>   |  20  |  1.5      Calcium, Total Serum: 8.1 mg/dL (07-21-18 @ 02:50)  Calcium, Total Serum: 9.0 mg/dL (07-20-18 @ 23:10)  Magnesium, Serum: 1.7 mg/dL (07-20-18 @ 23:10)      LFTs:         Coags:              --------------------------------------------------------------------------------------    OTHER LABS    IMAGING RESULTS      ASSESSMENT:  42y Female POD 1 from Total abdominal hysterectomy and bilateral salpingo-oophorectomy     PLAN:   Neurologic: Pain control with morphine and tylenol  Respiratory: IS   Cardiovascular: Monitor cardiac rhythm is setting of hyperkalemia   Gastrointestinal/Nutrition: Pantoprazole, Regular diet  Renal/Genitourinary: Closely monitor urine output  Hematologic: Subcutaneous Heparin for prophylaxis   Infectious Disease: No issues.  Endocrine: Monitor glucose levels  Disposition: SICU    --------------------------------------------------------------------------------------    Critical Care Diagnoses:

## 2018-07-21 NOTE — CONSULT NOTE ADULT - ATTENDING COMMENTS
Post ZOILA BSO, post op with elevated HR, drift down of Hgb, and new JUAN DIEGO.    Plan to hydrate and do serial labs.    Doubt need to any intervention.

## 2018-07-21 NOTE — PROGRESS NOTE ADULT - ASSESSMENT
41yo P0 with h/o left breast cancer s/p unilateral mastectomy, on tamoxifen (last dose 2 weeks ago), morbid obesity, s/p ZOILA BSO for fibroid uterus, EBL 400cc, POD1, upgraded to SICU for close monitoring for JUAN DIEGO and hyperkalemia.  -creatinine now trending down and potassium now normal - f/u repeat EKG   -s/p nephro consult - f/u renal/bladder sono  -f/u repeat labs - trend H/H, potassium, creatinine   -monitor urine output  -keep same pressure dressing on for now as staining is stable   -management per SICU    Dr. Reed aware. 43yo P0 with h/o left breast cancer s/p unilateral mastectomy, on tamoxifen (last dose 2 weeks ago), morbid obesity, s/p ZOILA BSO for fibroid uterus, EBL 400cc, POD1, upgraded to SICU for close monitoring for JUAN DIEGO and hyperkalemia.  -creatinine now trending down and potassium now normal - f/u repeat EKG   -s/p nephro consult - f/u renal/bladder sono  -f/u repeat labs - trend H/H, potassium, creatinine   -monitor urine output  -keep same pressure dressing on for now as staining is stable   -management per SICU    Dr. Reed aware.     Addendum:  Notified of H/H trend now 8.8/26.2. As pt is asymptomatic and appears clinically stable (not hypotensive, adequate urine output, no further staining on dressing, no vaginal bleeding), possible pt has developed a stable hematoma. Transfuse 2u PRBC, cross for another 2u PRBC. Recommend to D/C heparin for now. And will continue to monitor vital signs and repeat CBC. If there is indication of further bleeding, will take patient back to OR for re-exploration.    Dr. Reed and SICU team made aware.

## 2018-07-21 NOTE — PROVIDER CONTACT NOTE (OTHER) - ACTION/TREATMENT ORDERED:
MD placed 18g IV in Right Upper Arm which blew upon infusion of IVF/Bolus. IV finally placed by another RN.

## 2018-07-21 NOTE — PROGRESS NOTE ADULT - SUBJECTIVE AND OBJECTIVE BOX
PGY4 Note:    Pt seen and evaluated at bedside, no complaints. Pain well-controlled with PCA. Denies fever/chills, n/v, palpitations, dizziness, lightheadedness, chest pain, SOB, vaginal bleeding. Passed flatus this AM, no BM. Currently on bedrest and NPO.      ICU Vital Signs Last 24 Hrs  T(C): 37.1 (21 Jul 2018 12:00), Max: 37.1 (21 Jul 2018 12:00)  T(F): 98.7 (21 Jul 2018 12:00), Max: 98.7 (21 Jul 2018 12:00)  HR: 112 (21 Jul 2018 14:00) (82 - 118)  BP: 133/68 (21 Jul 2018 14:00) (69/54 - 169/66)  BP(mean): 89 (21 Jul 2018 14:00) (59 - 91)  RR: 14 (21 Jul 2018 14:00) (9 - 25)  SpO2: 99% (21 Jul 2018 14:00) (94% - 99%)    Gen: NAD, AAOx3  Heart: s1s2, mildly tachycardic   Lungs: ctab  Abd: soft, moderately distended, nontender no r/g/r, pressure dressing in place with stable staining at lower edge of dressing (marked)  Ext: SCDs in place, no calf tenderness b/l   Urine output (1582-9982): 545cc = 68cc/hr (min 52cc/hr)     MEDICATIONS  (STANDING):  docusate sodium 100 milliGRAM(s) Oral two times a day  heparin  Injectable 5000 Unit(s) SubCutaneous every 12 hours  morphine PCA (5 mG/mL) 30 milliLiter(s) PCA Continuous PCA Continuous  pantoprazole  Injectable 40 milliGRAM(s) IV Push daily  sodium chloride 0.45% 1000 milliLiter(s) (125 mL/Hr) IV Continuous <Continuous>    MEDICATIONS  (PRN):  acetaminophen   Tablet 650 milliGRAM(s) Oral every 6 hours PRN For Temp greater than 38 C (100.4 F)  diphenhydrAMINE   Capsule 25 milliGRAM(s) Oral every 4 hours PRN Rash and/or Itching  naloxone Injectable 0.1 milliGRAM(s) IV Push every 3 minutes PRN For ANY of the following changes in patient status:  A. RR LESS THAN 10 breaths per minute, B. Oxygen saturation LESS THAN 90%, C. Sedation score of 6  ondansetron Injectable 4 milliGRAM(s) IV Push every 6 hours PRN Nausea and/or Vomiting  simethicone 80 milliGRAM(s) Chew every 6 hours PRN Gas    LABS:                        11.9   14.99 )-----------( 316      ( 21 Jul 2018 05:30 )             36.9                         14.3   19.16 )-----------( 363      ( 20 Jul 2018 23:10 )             43.3     Magnesium, Serum: 1.9 mg/dL (07-21 @ 11:45)  Magnesium, Serum: 1.7 mg/dL (07-20 @ 23:10)    07-21-18 @ 11:45      134<L>  |  104  |  26<H>  ----------------------------<  142<H>  4.9   |  18  |  1.1    07-21-18 @ 02:50      138  |  105  |  25<H>  ----------------------------<  163<H>  6.6<HH>   |  20  |  1.5    07-20-18 @ 23:10      136  |  101  |  21<H>  ----------------------------<  135<H>  6.3<HH>   |  19  |  1.1        Ca    8.1<L>      21 Jul 2018 11:45  Ca    8.1<L>      21 Jul 2018 02:50  Ca    9.0      20 Jul 2018 23:10  Phos  3.8     07-21  Mg     1.9     07-21  Mg     1.7<L>     07-20

## 2018-07-21 NOTE — CHART NOTE - NSCHARTNOTEFT_GEN_A_CORE
Postop labs on 07/20/2018 at 23:10 showed creatinine 1.1 and K 6.3  The patient was comfortable with no complaints  EKG done at bedside showed peaked T waves.   Labs were ordered to be repeated as soon as the previous result were obtained to confirm this is a true hyperkalemia. The patient was a difficult stick and labs were obtained at 7/21/2018 at 2:50am; result confirmed the hyperkalemia at 6.6 with creat 1.5  Calcium gluconate was given at the bedside followed by bolus of D50 and insulin.  ICU cslt was called immediately for patient transfer. The patient was accepted to the SICU.   Renal cslt will be obtained once in the ICU for evaluation and management of the ARF

## 2018-07-21 NOTE — PROVIDER CONTACT NOTE (OTHER) - ASSESSMENT
Upon arrival to ICU, IV sites were assessed and noted to be extremely sluggish to flush with no blood return. Due to low BP, 500cc NS bolus was ordered. Unable to run through existing IVs via gravity. When switched to pump infusion, swelling was noted at Right Hand site with pain upon initiation. Unable to run IVF/Bolus through Right Wrist via pump due to "occlusion". MD Piña notified and aware, Surgical MD at bedside attempting to acquire IV access. Numerous unsuccessful attempts.

## 2018-07-21 NOTE — PROGRESS NOTE ADULT - ASSESSMENT
43yo P0 with h/o left breast cancer s/p unilateral mastectomy, on tamoxifen (last dose 2 weeks ago), morbid obesity, s/p ZOILA BSO for fibroid uterus, EBL 400cc, POD1, upgraded to SICU for close monitoring for JUAN DIEGO and hyperkalemia and now with H/H drop due to acute blood loss anemia, possible stable hematoma, to be transfused 1u PRBC now.   -potassium continues to be normal and Cr continuing to trend down   -s/p nephro consult - f/u renal/bladder sono  -monitor urine output - IV fluids decreased by SICU to 75cc/hr with IV boluses as needed   -keep same pressure dressing on for now as staining is stable   -monitor VS  -reassess after 1u PRBC  -pt has difficult access with left arm precautions, SICU team unable to place midline, consider PICC line tomorrow if needed   -management per SICU    Dr. Reed aware.

## 2018-07-21 NOTE — CHART NOTE - NSCHARTNOTEFT_GEN_A_CORE
Labs at 16:20: Hgb 8.8  Vitals: -120's; BP: 130-150/50-70  Urine output: has improved since this am and has been in the range of 50-70ml/hr, clear  Clinically the patient remains comfortable with no abdominal complaints  Abdominal examination: distention unchanged; dressing changed this morning dry, partially blood stained, but did not change over the last several hours.     Impression:  - Post operative Intra-abdominal bleeding: given improvement in creatinine and urine output in the last several hours, unchanged abdominal examination, and improvement in BP, the possibility that the bleeding episode is contained is to be considered. Repeat labs will be done around 7pm to assess for stability.  Based on the results, we will decide for transfusion vs. transfusion and return to the OR for exploration.    - ARF and Hyperkalemia: Renal cslt noted; creatinine and urine output improved; hyperkalemia resolved.     - EKG changes: EKG done after correction of hyperkalemia showed only sinus tachycardia.     I discussed with the patient and her sister Laurie present at her bedside the recent clinical changes and the current plan of care.

## 2018-07-21 NOTE — PROGRESS NOTE ADULT - SUBJECTIVE AND OBJECTIVE BOX
PGY4 Note:    Pt seen and evaluated at bedside. Reports some nausea, denies vomiting. Denies fever/chills, chest pain, SOB, abdominal pain, lightheadedness, dizziness.     ICU Vital Signs Last 24 Hrs  T(C): 37.6 (21 Jul 2018 20:00), Max: 37.8 (21 Jul 2018 17:00)  T(F): 99.6 (21 Jul 2018 20:00), Max: 100 (21 Jul 2018 17:00)  HR: 124 (21 Jul 2018 21:00) (106 - 126)  BP: 158/59 (21 Jul 2018 21:00) (69/54 - 177/60)  BP(mean): 91 (21 Jul 2018 21:00) (59 - 104)  RR: 13 (21 Jul 2018 21:00) (9 - 23)  SpO2: 96% (21 Jul 2018 21:00) (92% - 99%)    Gen: NAD, AAOx3  Abd: soft, moderately distended, pressure dressing in place with stable staining that is marked  Perineum: no blood  Urine output ([id number]): 40/40/45cc (min 52cc/hr)   Repeat EKG: sinus tachycardia   Bedside sono; no complex fluid in abdomen appreciated     Labs:                        8.6    7.51  )-----------( 200      ( 21 Jul 2018 20:00 )             25.5   07-21    135  |  102  |  25<H>  ----------------------------<  109<H>  4.6   |  22  |  0.9    Ca    8.2<L>      21 Jul 2018 20:00  Phos  3.8     07-21  Mg     1.9     07-21    MEDICATIONS  (STANDING):  docusate sodium 100 milliGRAM(s) Oral two times a day  morphine PCA (5 mG/mL) 30 milliLiter(s) PCA Continuous PCA Continuous  pantoprazole  Injectable 40 milliGRAM(s) IV Push daily  sodium chloride 0.9%. 1000 milliLiter(s) (75 mL/Hr) IV Continuous <Continuous>    MEDICATIONS  (PRN):  acetaminophen   Tablet 650 milliGRAM(s) Oral every 6 hours PRN For Temp greater than 38 C (100.4 F)  diphenhydrAMINE   Capsule 25 milliGRAM(s) Oral every 4 hours PRN Rash and/or Itching  naloxone Injectable 0.1 milliGRAM(s) IV Push every 3 minutes PRN For ANY of the following changes in patient status:  A. RR LESS THAN 10 breaths per minute, B. Oxygen saturation LESS THAN 90%, C. Sedation score of 6  ondansetron Injectable 4 milliGRAM(s) IV Push every 6 hours PRN Nausea and/or Vomiting  simethicone 80 milliGRAM(s) Chew every 6 hours PRN Gas

## 2018-07-21 NOTE — ANESTHESIA FOLLOW-UP NOTE - NSEVALATIONFT_GEN_ALL_CORE
patient visited overnight patient urine output waspoor with some hypotension transferred to [facility] for further care serum k was high urine output pickedup with fluid challenge and serum k came down atpresent doing well and stable post op hb was ok 11.9

## 2018-07-21 NOTE — CHART NOTE - NSCHARTNOTEFT_GEN_A_CORE
Transfer Note:    Patient is a 43 yo P0, PMHx breast cancer on tamoxafen, s/p ZOILA BSO for enlarged fibroid uterus. Patient's initial postop labs resulted at 23:10 drawn stat, 136/6.3/101/19/21/1.1<135, 19.16>14.3/43.3<363, Mag 1.7. K 6.3 noted and bedside stat EKG was called, showing peaked T waves. Cardiology fellow was called Dr. Steven who recommended Tele/CEU monitoring for abnormal EKG and for treatment of hyperkalemia. Patient is a very difficult stick and repeat labs were drawn as soon as possible at 02:50, 14.99>11.9/36.9<316 138/6.6/105/20/25/1.5<163, coags 12.3/1.14/45.5. Calcium gluconate 1g IVPB, insulin 10U IVP, followed by D50 50ml IVP administered. Stat ICU consult was called and patient accepted to SICU for management of ARF and hyperkalemia.       Patient clinically stable at this time. UO dark and decreased overnight from 60cc/hr to 50cc/hr, patient's minimum 50cc/hr, given bolus of NS 0.9% 1000cc. From 2300 to 0600 250cc total UO, clearing up. Patient feeling nauseous with copious biliary vomitus, non bloody. Pain was always well controlled.     Vital Signs Last 24 Hrs  T(C): 36.1 (21 Jul 2018 00:00), Max: 36.7 (20 Jul 2018 10:33)  T(F): 97 (21 Jul 2018 00:00), Max: 98 (20 Jul 2018 10:33)  HR: 109 (21 Jul 2018 00:00) (82 - 854)  BP: 82/43 (21 Jul 2018 00:00) (82/43 - 180/91)  BP(mean): --  RR: 18 (21 Jul 2018 00:00) (16 - 25)  SpO2: 97% (21 Jul 2018 00:00) (97% - 99%)  Gen: NAD, A&Ox3  Heart: S1S2, RRR  Lungs: CTABL  Abd: Obese, vertical dressing on abdomen, distended, hypoactive BS, soft, appropriately tender for postop  Ext: SCDs in place      MEDICATIONS  (STANDING):  dextrose 5% + sodium chloride 0.9%. 1000 milliLiter(s) (125 mL/Hr) IV Continuous <Continuous>  docusate sodium 100 milliGRAM(s) Oral two times a day  heparin  Injectable 5000 Unit(s) SubCutaneous every 12 hours  morphine PCA (5 mG/mL) 30 milliLiter(s) PCA Continuous PCA Continuous  pantoprazole  Injectable 40 milliGRAM(s) IV Push daily    MEDICATIONS  (PRN):  acetaminophen   Tablet 650 milliGRAM(s) Oral every 6 hours PRN For Temp greater than 38 C (100.4 F)  diphenhydrAMINE   Capsule 25 milliGRAM(s) Oral every 4 hours PRN Rash and/or Itching  naloxone Injectable 0.1 milliGRAM(s) IV Push every 3 minutes PRN For ANY of the following changes in patient status:  A. RR LESS THAN 10 breaths per minute, B. Oxygen saturation LESS THAN 90%, C. Sedation score of 6  ondansetron Injectable 4 milliGRAM(s) IV Push every 6 hours PRN Nausea  ondansetron Injectable 4 milliGRAM(s) IV Push every 4 hours PRN Nausea and/or Vomiting  simethicone 80 milliGRAM(s) Chew every 6 hours PRN Gas      Labs:                          11.9   14.99 )-----------( 316      ( 21 Jul 2018 05:30 )             36.9     07-21    138  |  105  |  25<H>  ----------------------------<  163<H>      6.6<HH>   |  20  |  1.5    Ca    8.1<L>      21 Jul 2018 02:50  Mg     1.7     07-20      PT/INR - ( 21 Jul 2018 02:50v )   PT: 12.30 sec;   INR: 1.14 ratio         PTT - ( 21 Jul 2018 02:50 )  PTT:45.5 sec    Troponin T, Serum: <0.01 ng/mL (07.21.18 @ 02:50)  CKMB Units: 2.2 ng/mL (07.21.18 @ 02:50)  Creatine Kinase, Serum: 72 U/L (07.21.18 @ 02:50)      A/P: 42y P0 h/o breast cancer 2017, with large fibroid uterus, s/p ZOILA-BSO, with acute renal failure likely 2/2 hypovolemic state, hyperkalemia with peaked T waves on EKG, stable at this time  - Transfer to SICU, signed out to TREVON Piña, and Dr. Reed spoke with Dr. Cam Transfer Note:    Patient is a 41 yo P0, PMHx breast cancer on tamoxafen, s/p ZOILA BSO for enlarged fibroid uterus. Patient's initial postop labs resulted at 23:10 drawn stat, 136/6.3/101/19/21/1.1<135, 19.16>14.3/43.3<363, Mag 1.7. K 6.3 noted and bedside stat EKG was called, showing peaked T waves. Cardiology fellow was called Dr. Steven who recommended Tele/CEU monitoring for abnormal EKG and for treatment of hyperkalemia. Patient is a very difficult stick and repeat labs were drawn as soon as possible at 02:50, 14.99>11.9/36.9<316 138/6.6/105/20/25/1.5<163, coags 12.3/1.14/45.5. Calcium gluconate 1g IVPB @ 0429, regular insulin 10U IVP @ 0441, followed by D50 50ml IVP administered @ 0441. Stat ICU consult was called and patient accepted to SICU for management of ARF and hyperkalemia.       Patient clinically stable at this time. UO dark and decreased overnight from 60cc/hr to 50cc/hr, patient's minimum 50cc/hr, given bolus of NS 0.9% 1000cc. From 2300 to 0600 250cc total UO, clearing up. Pain was always well controlled.       Vital Signs Last 24 Hrs  T(C): 36.1 (21 Jul 2018 00:00), Max: 36.7 (20 Jul 2018 10:33)  T(F): 97 (21 Jul 2018 00:00), Max: 98 (20 Jul 2018 10:33)  HR: 109 (21 Jul 2018 00:00) (82 - 854)  BP: 82/43 (21 Jul 2018 00:00) (82/43 - 180/91)  BP(mean): --  RR: 18 (21 Jul 2018 00:00) (16 - 25)  SpO2: 97% (21 Jul 2018 00:00) (97% - 99%)  Gen: NAD, A&Ox3  Heart: S1S2, RRR  Lungs: CTABL  Abd: Obese, vertical dressing on abdomen, distended, hypoactive BS, soft, appropriately tender for postop, slow ooze from inferior area of incision, other no erthema or induration  VE: Deferred, no active bleeding at vagina upon inspectin  Ext: SCDs in places, tr pedal edema, no calf tenderness      MEDICATIONS  (STANDING):  dextrose 5% + sodium chloride 0.9%. 1000 milliLiter(s) (125 mL/Hr) IV Continuous <Continuous>  docusate sodium 100 milliGRAM(s) Oral two times a day  heparin  Injectable 5000 Unit(s) SubCutaneous every 12 hours  morphine PCA (5 mG/mL) 30 milliLiter(s) PCA Continuous PCA Continuous  pantoprazole  Injectable 40 milliGRAM(s) IV Push daily    MEDICATIONS  (PRN):  acetaminophen   Tablet 650 milliGRAM(s) Oral every 6 hours PRN For Temp greater than 38 C (100.4 F)  diphenhydrAMINE   Capsule 25 milliGRAM(s) Oral every 4 hours PRN Rash and/or Itching  naloxone Injectable 0.1 milliGRAM(s) IV Push every 3 minutes PRN For ANY of the following changes in patient status:  A. RR LESS THAN 10 breaths per minute, B. Oxygen saturation LESS THAN 90%, C. Sedation score of 6  ondansetron Injectable 4 milliGRAM(s) IV Push every 6 hours PRN Nausea  ondansetron Injectable 4 milliGRAM(s) IV Push every 4 hours PRN Nausea and/or Vomiting  simethicone 80 milliGRAM(s) Chew every 6 hours PRN Gas      Labs:                          11.9   14.99 )-----------( 316      ( 21 Jul 2018 05:30 )             36.9     07-21    138  |  105  |  25<H>  ----------------------------<  163<H>      6.6<HH>   |  20  |  1.5    Ca    8.1<L>      21 Jul 2018 02:50  Mg     1.7     07-20      PT/INR - ( 21 Jul 2018 02:50v )   PT: 12.30 sec;   INR: 1.14 ratio         PTT - ( 21 Jul 2018 02:50 )  PTT:45.5 sec    Troponin T, Serum: <0.01 ng/mL (07.21.18 @ 02:50)  CKMB Units: 2.2 ng/mL (07.21.18 @ 02:50)  Creatine Kinase, Serum: 72 U/L (07.21.18 @ 02:50)      A/P: 42y P0 h/o breast cancer 2017, with large fibroid uterus, s/p ZOILA-BSO, with acute renal failure likely 2/2 hypovolemic state, hyperkalemia with peaked T waves on EKG, stable at this time  - Transfer to SICU, signed out to TREVON Piña, and Dr. Reed spoke with Dr. Cam (ICU attending)  - Serial labs  - F/u Nephro consult (no call back)  - Retroperitoneal ultrasound to look for hydronephrosis if suspicious  - Dressing changed this morning, GYN will monitor, may need to explore wound if it continues to ooze  - Maintain hemodynamics  - VS monitoring  - GYN to follow      [id number] GYN Spectra

## 2018-07-21 NOTE — PROVIDER CONTACT NOTE (OTHER) - BACKGROUND
Patient was noted to have two IVs, a Right Hand #20 and a Right Wrist #20 with IVF and Morphine PCA attached.

## 2018-07-21 NOTE — CONSULT NOTE ADULT - SUBJECTIVE AND OBJECTIVE BOX
[facility]  INITIAL CONSULT NOTE  --------------------------------------------------------------------------------  HPI:        PAST HISTORY  --------------------------------------------------------------------------------  PAST MEDICAL & SURGICAL HISTORY:  Fibroids  Arthritis  Obese  Breast cancer: left , had surgery, then tamoxifen  History of surgery: fibroid embolization  H/O left mastectomy    FAMILY HISTORY:  Family history of prostate cancer in father (Father)    PAST SOCIAL HISTORY:    ALLERGIES & MEDICATIONS  --------------------------------------------------------------------------------  Allergies    Naprosyn (Nausea)    Intolerances      Standing Inpatient Medications  dextrose 5% + sodium chloride 0.9%. 1000 milliLiter(s) IV Continuous <Continuous>  docusate sodium 100 milliGRAM(s) Oral two times a day  heparin  Injectable 5000 Unit(s) SubCutaneous every 12 hours  morphine PCA (5 mG/mL) 30 milliLiter(s) PCA Continuous PCA Continuous  pantoprazole  Injectable 40 milliGRAM(s) IV Push daily    PRN Inpatient Medications  acetaminophen   Tablet 650 milliGRAM(s) Oral every 6 hours PRN  diphenhydrAMINE   Capsule 25 milliGRAM(s) Oral every 4 hours PRN  naloxone Injectable 0.1 milliGRAM(s) IV Push every 3 minutes PRN  ondansetron Injectable 4 milliGRAM(s) IV Push every 6 hours PRN  ondansetron Injectable 4 milliGRAM(s) IV Push every 4 hours PRN  simethicone 80 milliGRAM(s) Chew every 6 hours PRN        VITALS/PHYSICAL EXAM  --------------------------------------------------------------------------------  T(C): 37 (07-21-18 @ 08:00), Max: 37 (07-21-18 @ 08:00)  HR: 108 (07-21-18 @ 08:30) (82 - 854)  BP: 115/61 (07-21-18 @ 08:30) (69/54 - 180/91)  RR: 9 (07-21-18 @ 08:30) (9 - 25)  SpO2: 98% (07-21-18 @ 08:30) (95% - 99%)  Wt(kg): --    Weight (kg): 104.3 (07-20-18 @ 11:24)      07-20-18 @ 07:01  -  07-21-18 @ 07:00  --------------------------------------------------------  IN: 2575 mL / OUT: 540 mL / NET: 2035 mL    07-21-18 @ 07:01  -  07-21-18 @ 09:10  --------------------------------------------------------  IN: 0 mL / OUT: 20 mL / NET: -20 mL      Physical Exam:  	Gen: NAD  	Pulm: CTA B/L  	CV:  S1S2; no rub  	Abd:   	: No suprapubic tenderness  	LE:   	  LABS/STUDIES  --------------------------------------------------------------------------------              11.9   14.99 >-----------<  316      [07-21-18 @ 05:30]              36.9     138  |  105  |  25  ----------------------------<  163      [07-21-18 @ 02:50]  6.6   |  20  |  1.5        Ca     8.1     [07-21-18 @ 02:50]      Mg     1.7     [07-20-18 @ 23:10]      PT/INR: PT 12.30, INR 1.14       [07-21-18 @ 05:30]  PTT: 45.5       [07-21-18 @ 05:30]    Troponin <0.01      [07-21-18 @ 05:30]  CK 72      [07-21-18 @ 05:30]    Creatinine Trend:  SCr 1.5 [07-21 @ 02:50]  SCr 1.1 [07-20 @ 23:10]  SCr 0.7 [07-10 @ 20:50]    Urinalysis - [07-10-18 @ 20:50]      Color Red / Appearance Turbid / SG 1.025 / pH 5.5      Gluc Negative / Ketone Trace  / Bili Moderate / Urobili 0.2       Blood Large / Protein 100 / Leuk Est Small / Nitrite Negative      RBC >50 / WBC 3-5 / Hyaline  / Gran  / Sq Epi  / Non Sq Epi Moderate / Bacteria 42 year old female with a PMH of Breast Ca in 2017 now POD 1 s/p Total abdominal hysterectomy and bilateral salpingo-oophorectomy for Large 30w size uterus with multiple fibroids (intramural, subserosal, pedunculated) called for JUAN DIEGO with hyperkalemia         PAST HISTORY  --------------------------------------------------------------------------------  PAST MEDICAL & SURGICAL HISTORY:  Fibroids  Arthritis  Obese  Breast cancer: left , had surgery, then tamoxifen  History of surgery: fibroid embolization  H/O left mastectomy    FAMILY HISTORY:  Family history of prostate cancer in father (Father)    PAST SOCIAL HISTORY:    ALLERGIES & MEDICATIONS  --------------------------------------------------------------------------------  Allergies    Naprosyn (Nausea)    Intolerances      Standing Inpatient Medications  dextrose 5% + sodium chloride 0.9%. 1000 milliLiter(s) IV Continuous <Continuous>  docusate sodium 100 milliGRAM(s) Oral two times a day  heparin  Injectable 5000 Unit(s) SubCutaneous every 12 hours  morphine PCA (5 mG/mL) 30 milliLiter(s) PCA Continuous PCA Continuous  pantoprazole  Injectable 40 milliGRAM(s) IV Push daily    PRN Inpatient Medications  acetaminophen   Tablet 650 milliGRAM(s) Oral every 6 hours PRN  diphenhydrAMINE   Capsule 25 milliGRAM(s) Oral every 4 hours PRN  naloxone Injectable 0.1 milliGRAM(s) IV Push every 3 minutes PRN  ondansetron Injectable 4 milliGRAM(s) IV Push every 6 hours PRN  ondansetron Injectable 4 milliGRAM(s) IV Push every 4 hours PRN  simethicone 80 milliGRAM(s) Chew every 6 hours PRN        VITALS/PHYSICAL EXAM  --------------------------------------------------------------------------------  T(C): 37 (07-21-18 @ 08:00), Max: 37 (07-21-18 @ 08:00)  HR: 108 (07-21-18 @ 08:30) (82 - 854)  BP: 115/61 (07-21-18 @ 08:30) (69/54 - 180/91)  RR: 9 (07-21-18 @ 08:30) (9 - 25)  SpO2: 98% (07-21-18 @ 08:30) (95% - 99%)  Wt(kg): --    Weight (kg): 104.3 (07-20-18 @ 11:24)      07-20-18 @ 07:01  -  07-21-18 @ 07:00  --------------------------------------------------------  IN: 2575 mL / OUT: 540 mL / NET: 2035 mL    07-21-18 @ 07:01  -  07-21-18 @ 09:10  --------------------------------------------------------  IN: 0 mL / OUT: 20 mL / NET: -20 mL      Physical Exam:  	Gen: NAD  	Pulm: decrease BS  B/L  	CV:  S1S2; no rub  	Abd: dressing   	: No suprapubic tenderness  	LE: no edema    	  LABS/STUDIES  --------------------------------------------------------------------------------              11.9   14.99 >-----------<  316      [07-21-18 @ 05:30]              36.9     138  |  105  |  25  ----------------------------<  163      [07-21-18 @ 02:50]  6.6   |  20  |  1.5        Ca     8.1     [07-21-18 @ 02:50]      Mg     1.7     [07-20-18 @ 23:10]      PT/INR: PT 12.30, INR 1.14       [07-21-18 @ 05:30]  PTT: 45.5       [07-21-18 @ 05:30]    Troponin <0.01      [07-21-18 @ 05:30]  CK 72      [07-21-18 @ 05:30]    Creatinine Trend:  SCr 1.5 [07-21 @ 02:50]  SCr 1.1 [07-20 @ 23:10]  SCr 0.7 [07-10 @ 20:50]    Urinalysis - [07-10-18 @ 20:50]      Color Red / Appearance Turbid / SG 1.025 / pH 5.5      Gluc Negative / Ketone Trace  / Bili Moderate / Urobili 0.2       Blood Large / Protein 100 / Leuk Est Small / Nitrite Negative      RBC >50 / WBC 3-5 / Hyaline  / Gran  / Sq Epi  / Non Sq Epi Moderate / Bacteria

## 2018-07-22 LAB
ANION GAP SERPL CALC-SCNC: 7 MMOL/L — SIGNIFICANT CHANGE UP (ref 7–14)
ANION GAP SERPL CALC-SCNC: 8 MMOL/L — SIGNIFICANT CHANGE UP (ref 7–14)
APTT BLD: 34.2 SEC — SIGNIFICANT CHANGE UP (ref 27–39.2)
BUN SERPL-MCNC: 16 MG/DL — SIGNIFICANT CHANGE UP (ref 10–20)
BUN SERPL-MCNC: 22 MG/DL — HIGH (ref 10–20)
CALCIUM SERPL-MCNC: 8.4 MG/DL — LOW (ref 8.5–10.1)
CALCIUM SERPL-MCNC: 8.8 MG/DL — SIGNIFICANT CHANGE UP (ref 8.5–10.1)
CHLORIDE SERPL-SCNC: 101 MMOL/L — SIGNIFICANT CHANGE UP (ref 98–110)
CHLORIDE SERPL-SCNC: 105 MMOL/L — SIGNIFICANT CHANGE UP (ref 98–110)
CO2 SERPL-SCNC: 24 MMOL/L — SIGNIFICANT CHANGE UP (ref 17–32)
CO2 SERPL-SCNC: 26 MMOL/L — SIGNIFICANT CHANGE UP (ref 17–32)
CREAT SERPL-MCNC: 0.6 MG/DL — LOW (ref 0.7–1.5)
CREAT SERPL-MCNC: 0.8 MG/DL — SIGNIFICANT CHANGE UP (ref 0.7–1.5)
GLUCOSE SERPL-MCNC: 101 MG/DL — HIGH (ref 70–99)
GLUCOSE SERPL-MCNC: 116 MG/DL — HIGH (ref 70–99)
HCT VFR BLD CALC: 24.7 % — LOW (ref 37–47)
HCT VFR BLD CALC: 25.3 % — LOW (ref 37–47)
HCT VFR BLD CALC: 25.4 % — LOW (ref 37–47)
HCT VFR BLD CALC: 25.5 % — LOW (ref 37–47)
HGB BLD-MCNC: 8.3 G/DL — LOW (ref 12–16)
HGB BLD-MCNC: 8.3 G/DL — LOW (ref 12–16)
HGB BLD-MCNC: 8.4 G/DL — LOW (ref 12–16)
HGB BLD-MCNC: 8.6 G/DL — LOW (ref 12–16)
INR BLD: 1.27 RATIO — SIGNIFICANT CHANGE UP (ref 0.65–1.3)
MAGNESIUM SERPL-MCNC: 1.9 MG/DL — SIGNIFICANT CHANGE UP (ref 1.8–2.4)
MAGNESIUM SERPL-MCNC: 2 MG/DL — SIGNIFICANT CHANGE UP (ref 1.8–2.4)
MCHC RBC-ENTMCNC: 28.7 PG — SIGNIFICANT CHANGE UP (ref 27–31)
MCHC RBC-ENTMCNC: 28.7 PG — SIGNIFICANT CHANGE UP (ref 27–31)
MCHC RBC-ENTMCNC: 29.4 PG — SIGNIFICANT CHANGE UP (ref 27–31)
MCHC RBC-ENTMCNC: 29.6 PG — SIGNIFICANT CHANGE UP (ref 27–31)
MCHC RBC-ENTMCNC: 32.7 G/DL — SIGNIFICANT CHANGE UP (ref 32–37)
MCHC RBC-ENTMCNC: 32.8 G/DL — SIGNIFICANT CHANGE UP (ref 32–37)
MCHC RBC-ENTMCNC: 33.7 G/DL — SIGNIFICANT CHANGE UP (ref 32–37)
MCHC RBC-ENTMCNC: 34 G/DL — SIGNIFICANT CHANGE UP (ref 32–37)
MCV RBC AUTO: 87 FL — SIGNIFICANT CHANGE UP (ref 81–99)
MCV RBC AUTO: 87 FL — SIGNIFICANT CHANGE UP (ref 81–99)
MCV RBC AUTO: 87.5 FL — SIGNIFICANT CHANGE UP (ref 81–99)
MCV RBC AUTO: 87.9 FL — SIGNIFICANT CHANGE UP (ref 81–99)
NRBC # BLD: 0 /100 WBCS — SIGNIFICANT CHANGE UP (ref 0–0)
PHOSPHATE SERPL-MCNC: 2.3 MG/DL — SIGNIFICANT CHANGE UP (ref 2.1–4.9)
PLATELET # BLD AUTO: 103 K/UL — LOW (ref 130–400)
PLATELET # BLD AUTO: 126 K/UL — LOW (ref 130–400)
PLATELET # BLD AUTO: 137 K/UL — SIGNIFICANT CHANGE UP (ref 130–400)
PLATELET # BLD AUTO: 139 K/UL — SIGNIFICANT CHANGE UP (ref 130–400)
POTASSIUM SERPL-MCNC: 4.1 MMOL/L — SIGNIFICANT CHANGE UP (ref 3.5–5)
POTASSIUM SERPL-MCNC: 4.4 MMOL/L — SIGNIFICANT CHANGE UP (ref 3.5–5)
POTASSIUM SERPL-SCNC: 4.1 MMOL/L — SIGNIFICANT CHANGE UP (ref 3.5–5)
POTASSIUM SERPL-SCNC: 4.4 MMOL/L — SIGNIFICANT CHANGE UP (ref 3.5–5)
PROTHROM AB SERPL-ACNC: 13.7 SEC — HIGH (ref 9.95–12.87)
RBC # BLD: 2.84 M/UL — LOW (ref 4.2–5.4)
RBC # BLD: 2.89 M/UL — LOW (ref 4.2–5.4)
RBC # BLD: 2.89 M/UL — LOW (ref 4.2–5.4)
RBC # BLD: 2.93 M/UL — LOW (ref 4.2–5.4)
RBC # FLD: 13.6 % — SIGNIFICANT CHANGE UP (ref 11.5–14.5)
RBC # FLD: 13.6 % — SIGNIFICANT CHANGE UP (ref 11.5–14.5)
RBC # FLD: 13.8 % — SIGNIFICANT CHANGE UP (ref 11.5–14.5)
RBC # FLD: 13.8 % — SIGNIFICANT CHANGE UP (ref 11.5–14.5)
SODIUM SERPL-SCNC: 132 MMOL/L — LOW (ref 135–146)
SODIUM SERPL-SCNC: 139 MMOL/L — SIGNIFICANT CHANGE UP (ref 135–146)
WBC # BLD: 5.49 K/UL — SIGNIFICANT CHANGE UP (ref 4.8–10.8)
WBC # BLD: 5.51 K/UL — SIGNIFICANT CHANGE UP (ref 4.8–10.8)
WBC # BLD: 5.69 K/UL — SIGNIFICANT CHANGE UP (ref 4.8–10.8)
WBC # BLD: 5.77 K/UL — SIGNIFICANT CHANGE UP (ref 4.8–10.8)
WBC # FLD AUTO: 5.49 K/UL — SIGNIFICANT CHANGE UP (ref 4.8–10.8)
WBC # FLD AUTO: 5.51 K/UL — SIGNIFICANT CHANGE UP (ref 4.8–10.8)
WBC # FLD AUTO: 5.69 K/UL — SIGNIFICANT CHANGE UP (ref 4.8–10.8)
WBC # FLD AUTO: 5.77 K/UL — SIGNIFICANT CHANGE UP (ref 4.8–10.8)

## 2018-07-22 PROCEDURE — 99233 SBSQ HOSP IP/OBS HIGH 50: CPT

## 2018-07-22 RX ORDER — OXYCODONE AND ACETAMINOPHEN 5; 325 MG/1; MG/1
1 TABLET ORAL EVERY 4 HOURS
Qty: 0 | Refills: 0 | Status: DISCONTINUED | OUTPATIENT
Start: 2018-07-22 | End: 2018-07-27

## 2018-07-22 RX ORDER — MORPHINE SULFATE 50 MG/1
30 CAPSULE, EXTENDED RELEASE ORAL
Qty: 0 | Refills: 0 | Status: DISCONTINUED | OUTPATIENT
Start: 2018-07-22 | End: 2018-07-22

## 2018-07-22 RX ORDER — SODIUM CHLORIDE 9 MG/ML
250 INJECTION INTRAMUSCULAR; INTRAVENOUS; SUBCUTANEOUS ONCE
Qty: 0 | Refills: 0 | Status: DISCONTINUED | OUTPATIENT
Start: 2018-07-22 | End: 2018-07-22

## 2018-07-22 RX ORDER — ACETAMINOPHEN 500 MG
650 TABLET ORAL EVERY 6 HOURS
Qty: 0 | Refills: 0 | Status: DISCONTINUED | OUTPATIENT
Start: 2018-07-22 | End: 2018-08-01

## 2018-07-22 RX ORDER — ONDANSETRON 8 MG/1
4 TABLET, FILM COATED ORAL EVERY 6 HOURS
Qty: 0 | Refills: 0 | Status: DISCONTINUED | OUTPATIENT
Start: 2018-07-22 | End: 2018-07-31

## 2018-07-22 RX ORDER — NALOXONE HYDROCHLORIDE 4 MG/.1ML
0.1 SPRAY NASAL
Qty: 0 | Refills: 0 | Status: DISCONTINUED | OUTPATIENT
Start: 2018-07-22 | End: 2018-07-22

## 2018-07-22 RX ORDER — OXYCODONE AND ACETAMINOPHEN 5; 325 MG/1; MG/1
2 TABLET ORAL EVERY 6 HOURS
Qty: 0 | Refills: 0 | Status: DISCONTINUED | OUTPATIENT
Start: 2018-07-22 | End: 2018-07-26

## 2018-07-22 RX ORDER — SODIUM CHLORIDE 9 MG/ML
250 INJECTION INTRAMUSCULAR; INTRAVENOUS; SUBCUTANEOUS ONCE
Qty: 0 | Refills: 0 | Status: COMPLETED | OUTPATIENT
Start: 2018-07-22 | End: 2018-07-22

## 2018-07-22 RX ADMIN — PANTOPRAZOLE SODIUM 40 MILLIGRAM(S): 20 TABLET, DELAYED RELEASE ORAL at 13:23

## 2018-07-22 RX ADMIN — Medication 100 MILLIGRAM(S): at 05:30

## 2018-07-22 RX ADMIN — Medication 100 MILLIGRAM(S): at 18:17

## 2018-07-22 RX ADMIN — SODIUM CHLORIDE 750 MILLILITER(S): 9 INJECTION INTRAMUSCULAR; INTRAVENOUS; SUBCUTANEOUS at 03:06

## 2018-07-22 RX ADMIN — Medication 650 MILLIGRAM(S): at 19:48

## 2018-07-22 RX ADMIN — OXYCODONE AND ACETAMINOPHEN 1 TABLET(S): 5; 325 TABLET ORAL at 21:01

## 2018-07-22 NOTE — PROGRESS NOTE ADULT - ASSESSMENT
43 yo P0 with h/o left breast cancer s/p L unilateral mastectomy, on tamoxifen (last dose 2 weeks ago), morbid obesity, s/p ZOILA BSO for fibroid uterus, EBL 400cc, POD2, upgraded to SICU for close monitoring for JUAN DIEGO (now resolved) and hyperkalemia (now resolved) and with H/H drop due to acute blood loss anemia now stable, possible stable hematoma, s/p 2u PRBC, with improved tachycardia and urine output, doing well at this time    BPs in SICU noted to be elevated, likely due to fluid overload, patient denies CP or SOB, BPs observed and repeat once on the med-surg floor was wnl.    - Cont NS at 75cc/hr as per Nephro  - Cont arteaga catheter  - Strict I/O  - VS Q4h  - Bedrest with SCDs  - Regular diet  - D/C PCA  - PO pain meds (percocet/tylenol)  - Monitor abdominal dressing  - No Heparin or NSAIDS for now  - D/C FS  - AM labs ordered (cbc, bmp, mag, phos)      Dr. Reed aware

## 2018-07-22 NOTE — PROGRESS NOTE ADULT - SUBJECTIVE AND OBJECTIVE BOX
GYN Progress Note  Pt seen and examined at bedside in NAD. Denies any complaints, no acute events today, patient downgraded from SICU this afternoon. Patient reports feeling much better, tolerating PO, denies any nausea or vomiting, Reports has not ambulated yet.     Physical exam:    Vital Signs Last 24 Hrs  T(F): 96.2 (22 Jul 2018 17:22), Max: 100.1 (21 Jul 2018 22:20)  HR: 108 (22 Jul 2018 17:22) (96 - 116)  BP: 126/66 (22 Jul 2018 17:22) (113/81 - 183/74)  RR: 20 (22 Jul 2018 17:22) (15 - 35)  SpO2: 92% (22 Jul 2018 15:00) (84% - 100%)    Gen: alert, oriented  CVS: RRR  Lungs: CTAB  Abdomen: Soft, nontender, non distended. No rebound, rigidity or guarding. Dressing with small area of saturation on RLQ, increased from this afternoon, plan for dressing change in AM  Perineum: no active bleeding. Garcia in place, clear urine  Ext: No calf tenderness  Garcia: [id number]: 600cc (120cc/hr)    Diet: Regular    MEDICATIONS  (STANDING):  docusate sodium 100 milliGRAM(s) Oral two times a day  pantoprazole  Injectable 40 milliGRAM(s) IV Push daily    MEDICATIONS  (PRN):  acetaminophen   Tablet 650 milliGRAM(s) Oral every 6 hours PRN For Temp greater than 38 C (100.4 F)  acetaminophen   Tablet. 650 milliGRAM(s) Oral every 6 hours PRN Mild Pain (1 - 3)  diphenhydrAMINE   Capsule 25 milliGRAM(s) Oral every 4 hours PRN Rash and/or Itching  ondansetron Injectable 4 milliGRAM(s) IV Push every 6 hours PRN Nausea  ondansetron Injectable 4 milliGRAM(s) IV Push every 6 hours PRN Nausea and/or Vomiting  oxyCODONE    5 mG/acetaminophen 325 mG 1 Tablet(s) Oral every 4 hours PRN Moderate Pain (4 - 6)  oxyCODONE    5 mG/acetaminophen 325 mG 2 Tablet(s) Oral every 6 hours PRN Severe Pain (7 - 10)  simethicone 80 milliGRAM(s) Chew every 6 hours PRN Gas      LABS:                        8.6    5.49  )-----------( 139      ( 22 Jul 2018 11:30 )             25.5                07-22-18 @ 11:30      139  |  105  |  16  ----------------------------<  101<H>  4.1   |  26  |  0.6<L>    07-22-18 @ 01:14

## 2018-07-22 NOTE — PROGRESS NOTE ADULT - SUBJECTIVE AND OBJECTIVE BOX
PGY4 Note:    Pt seen and evaluated at bedside. Denies lightheadedness, dizziness, fever/chills, headache, chest pain, SOB, n/v.     ICU Vital Signs Last 24 Hrs  T(C): 37.3 (22 Jul 2018 00:00), Max: 37.8 (21 Jul 2018 17:00)  T(F): 99.2 (22 Jul 2018 00:00), Max: 100.1 (21 Jul 2018 22:20)  HR: 108 (22 Jul 2018 02:30) (106 - 126)  BP: 163/68 (22 Jul 2018 02:30) (69/54 - 177/60)  BP(mean): 100 (22 Jul 2018 02:30) (59 - 104)  RR: 17 (22 Jul 2018 02:30) (9 - 23)  SpO2: 100% (22 Jul 2018 02:30) (92% - 100%)    Gen: NAD, AAOx3  Abd: soft, moderately distended, pressure dressing in place with stable staining that is marked and unchanged   Perineum: no blood  Urine output ([id number]): 75/100/75cc  ( (min 52cc/hr)     Labs:                        8.3    5.69  )-----------( 103      ( 22 Jul 2018 02:26 )             25.4   07-22    132<L>  |  101  |  22<H>  ----------------------------<  116<H>  4.4   |  24  |  0.8    Ca    8.4<L>      22 Jul 2018 01:14  Phos  2.3     07-22  Mg     1.9     07-22            MEDICATIONS  (STANDING):  docusate sodium 100 milliGRAM(s) Oral two times a day  morphine PCA (5 mG/mL) 30 milliLiter(s) PCA Continuous PCA Continuous  pantoprazole  Injectable 40 milliGRAM(s) IV Push daily  sodium chloride 0.9%. 1000 milliLiter(s) (75 mL/Hr) IV Continuous <Continuous>    MEDICATIONS  (PRN):  acetaminophen   Tablet 650 milliGRAM(s) Oral every 6 hours PRN For Temp greater than 38 C (100.4 F)  diphenhydrAMINE   Capsule 25 milliGRAM(s) Oral every 4 hours PRN Rash and/or Itching  naloxone Injectable 0.1 milliGRAM(s) IV Push every 3 minutes PRN For ANY of the following changes in patient status:  A. RR LESS THAN 10 breaths per minute, B. Oxygen saturation LESS THAN 90%, C. Sedation score of 6  ondansetron Injectable 4 milliGRAM(s) IV Push every 6 hours PRN Nausea and/or Vomiting  simethicone 80 milliGRAM(s) Chew every 6 hours PRN Gas

## 2018-07-22 NOTE — PROGRESS NOTE ADULT - ASSESSMENT
42 year old female with a PMH of Breast Ca in 2017 now POD 1 s/p Total abdominal hysterectomy and bilateral salpingo-oophorectomy for Large 30w size uterus with multiple fibroids (intramural, subserosal, pedunculated) called for JUAN DIEGO with hyperkalemia:  # prerenal in nature   # continue NS at 75 cc/h  # s/p blood transfusion , follow h/h  # creatinine improved   # check repeat K stat  # potassium improved  # renal sono noted : no hydro  # will sign off recall as needed

## 2018-07-22 NOTE — PROGRESS NOTE ADULT - SUBJECTIVE AND OBJECTIVE BOX
PGY 3 Note:    Patient seen and evaluated at bedside, feeling less tired than before. Denies lightheadedness, dizziness, fever/chills, headache, chest pain, SOB, N/V.    ICU Vital Signs Last 24 Hrs  T(C): 37.2 (22 Jul 2018 08:00), Max: 37.8 (21 Jul 2018 17:00)  T(F): 98.9 (22 Jul 2018 08:00), Max: 100.1 (21 Jul 2018 22:20)  HR: 104 (22 Jul 2018 08:00) (102 - 126)  BP: 152/64 (22 Jul 2018 08:00) (113/81 - 177/60)  BP(mean): 86 (22 Jul 2018 06:00) (81 - 104)  RR: 28 (22 Jul 2018 08:00) (9 - 28)  SpO2: 99% (22 Jul 2018 08:00) (92% - 100%)  UO: 9513-8610: 1135cc -> 94cc/hr (min 52c/hr)    Gen: NAD, A&OX3  Heart: S1S2, RRR  Lungs: CTAB  Abd: soft, moderately distended, pressure dressing in place with stable markings from yesterday unchanged and small new staining on mid-upper portion of dressing and left side of dressing as well, marked at 0745, no CVA tenderness  Perineum: no blood  Ext: SCD on left leg, BP cuff on right calf, no edema or calf tenderness, midline IV access on right upper arm    Labs:                          8.4    5.51  )-----------( 126      ( 22 Jul 2018 07:58 )             24.7   (s/p 2U PRBC)    07-22    132<L>  |  101  |  22<H>  ----------------------------<  116<H>  4.4   |  24  |  0.8    Ca    8.4<L>      22 Jul 2018 01:14  Phos  2.3     07-22  Mg     1.9     07-22      MEDICATIONS  (STANDING):  docusate sodium 100 milliGRAM(s) Oral two times a day  morphine PCA (5 mG/mL) 30 milliLiter(s) PCA Continuous PCA Continuous  pantoprazole  Injectable 40 milliGRAM(s) IV Push daily  sodium chloride 0.9% Bolus 250 milliLiter(s) IV Bolus once  sodium chloride 0.9%. 1000 milliLiter(s) (75 mL/Hr) IV Continuous <Continuous>    MEDICATIONS  (PRN):  acetaminophen   Tablet 650 milliGRAM(s) Oral every 6 hours PRN For Temp greater than 38 C (100.4 F)  diphenhydrAMINE   Capsule 25 milliGRAM(s) Oral every 4 hours PRN Rash and/or Itching  naloxone Injectable 0.1 milliGRAM(s) IV Push every 3 minutes PRN For ANY of the following changes in patient status:  A. RR LESS THAN 10 breaths per minute, B. Oxygen saturation LESS THAN 90%, C. Sedation score of 6  ondansetron Injectable 4 milliGRAM(s) IV Push every 6 hours PRN Nausea and/or Vomiting  simethicone 80 milliGRAM(s) Chew every 6 hours PRN Gas      IMAGING:    Repeat EKG 7/22 Sinus Tachycardia  Bedside FAST neg    < from: US Retroperitoneal Complete (07.22.18 @ 00:48) >  EXAM:  US RETROPERITONEAL COMPLETE            PROCEDURE DATE:  07/22/2018        INTERPRETATION:  CLINICAL HISTORY: Decreased urine output, increased   creatinine    COMPARISON: None.    PROCEDURE: Retroperitoneal Ultrasound was performed.    FINDINGS:    RIGHT KIDNEY: Normal in echogenicity, size measuring 11.3 cm in length.   No evidence of hydronephrosis, calculus or solid mass. Vascular flow is   demonstrated at the hilum.    LEFT KIDNEY: Normal in echogenicity, size measuring 12.6 cm in length. No   evidence of hydronephrosis, calculus or solid mass. Vascular flow is   demonstrated at the hilum.     URINARY BLADDER: Collapsed around a Garcia catheter.    IMPRESSION:  Normal renal ultrasound.    Collapsed urinary bladder      MARÍA GILLESPIE M.D., ATTENDING RADIOLOGIST  This document has been electronically signed. Jul 22 2018  7:02AM    < end of copied text >    < from: Xray Chest 1 View- PORTABLE-Routine (07.22.18 @ 06:50)       EXAM:  XR CHEST PORTABLE ROUTINE 1V            PROCEDURE DATE:  07/22/2018        INTERPRETATION:    Clinical History / Reason for exam: Respiratory abnormality, ICU   decreased renal function, post uterine artery embolization    Comparison : Chest radiograph 7/10/2018    Technique/Positioning: Satisfactory.    Findings:    Support devices: None.    Cardiac/mediastinum/hilum: Magnified.    Lung parenchyma/Pleura: Low lung volumes with elevation of the right   hemidiaphragm and bibasilar opacities/atelectasis..    Skeleton/soft tissues: Unremarkable.    Impression:    Low lung volumes with bibasilar opacities/atelectasis      MARÍA GILLESPIE M.D., ATTENDING RADIOLOGIST  This document has been electronically signed. Jul 22 2018  7:45AM        < end of copied text > PGY 3 Note:    Patient seen and evaluated at bedside, feeling less tired than before. Denies lightheadedness, dizziness, fever/chills, headache, chest pain, SOB, N/V.    ICU Vital Signs Last 24 Hrs  T(C): 37.2 (22 Jul 2018 08:00), Max: 37.8 (21 Jul 2018 17:00)  T(F): 98.9 (22 Jul 2018 08:00), Max: 100.1 (21 Jul 2018 22:20)  HR: 104 (22 Jul 2018 08:00) (102 - 126)  BP: 152/64 (22 Jul 2018 08:00) (113/81 - 177/60)  BP(mean): 86 (22 Jul 2018 06:00) (81 - 104)  RR: 28 (22 Jul 2018 08:00) (9 - 28)  SpO2: 99% (22 Jul 2018 08:00) (92% - 100%)  UO: 4540-1907: 1135cc -> 94cc/hr (min 52c/hr)    Gen: NAD, A&OX3  Heart: S1S2, RRR  Lungs: CTAB  Abd: soft, moderately distended, pressure dressing in place with stable markings from yesterday unchanged and small new staining on mid-upper portion of dressing and left side of dressing as well, marked at 0745, no CVA tenderness  Perineum: no blood  Ext: SCD on left leg, BP cuff on right calf, no edema or calf tenderness    Labs:                          8.4    5.51  )-----------( 126      ( 22 Jul 2018 07:58 )             24.7   (s/p 2U PRBC)    07-22    132<L>  |  101  |  22<H>  ----------------------------<  116<H>  4.4   |  24  |  0.8    Ca    8.4<L>      22 Jul 2018 01:14  Phos  2.3     07-22  Mg     1.9     07-22      MEDICATIONS  (STANDING):  docusate sodium 100 milliGRAM(s) Oral two times a day  morphine PCA (5 mG/mL) 30 milliLiter(s) PCA Continuous PCA Continuous  pantoprazole  Injectable 40 milliGRAM(s) IV Push daily  sodium chloride 0.9% Bolus 250 milliLiter(s) IV Bolus once  sodium chloride 0.9%. 1000 milliLiter(s) (75 mL/Hr) IV Continuous <Continuous>    MEDICATIONS  (PRN):  acetaminophen   Tablet 650 milliGRAM(s) Oral every 6 hours PRN For Temp greater than 38 C (100.4 F)  diphenhydrAMINE   Capsule 25 milliGRAM(s) Oral every 4 hours PRN Rash and/or Itching  naloxone Injectable 0.1 milliGRAM(s) IV Push every 3 minutes PRN For ANY of the following changes in patient status:  A. RR LESS THAN 10 breaths per minute, B. Oxygen saturation LESS THAN 90%, C. Sedation score of 6  ondansetron Injectable 4 milliGRAM(s) IV Push every 6 hours PRN Nausea and/or Vomiting  simethicone 80 milliGRAM(s) Chew every 6 hours PRN Gas      IMAGING:    Repeat EKG 7/22 Sinus Tachycardia  Bedside FAST neg    < from: US Retroperitoneal Complete (07.22.18 @ 00:48) >  EXAM:  US RETROPERITONEAL COMPLETE            PROCEDURE DATE:  07/22/2018        INTERPRETATION:  CLINICAL HISTORY: Decreased urine output, increased   creatinine    COMPARISON: None.    PROCEDURE: Retroperitoneal Ultrasound was performed.    FINDINGS:    RIGHT KIDNEY: Normal in echogenicity, size measuring 11.3 cm in length.   No evidence of hydronephrosis, calculus or solid mass. Vascular flow is   demonstrated at the hilum.    LEFT KIDNEY: Normal in echogenicity, size measuring 12.6 cm in length. No   evidence of hydronephrosis, calculus or solid mass. Vascular flow is   demonstrated at the hilum.     URINARY BLADDER: Collapsed around a Garcia catheter.    IMPRESSION:  Normal renal ultrasound.    Collapsed urinary bladder      MARÍA GILLESPIE M.D., ATTENDING RADIOLOGIST  This document has been electronically signed. Jul 22 2018  7:02AM    < end of copied text >    < from: Xray Chest 1 View- PORTABLE-Routine (07.22.18 @ 06:50)       EXAM:  XR CHEST PORTABLE ROUTINE 1V            PROCEDURE DATE:  07/22/2018        INTERPRETATION:    Clinical History / Reason for exam: Respiratory abnormality, ICU   decreased renal function, post uterine artery embolization    Comparison : Chest radiograph 7/10/2018    Technique/Positioning: Satisfactory.    Findings:    Support devices: None.    Cardiac/mediastinum/hilum: Magnified.    Lung parenchyma/Pleura: Low lung volumes with elevation of the right   hemidiaphragm and bibasilar opacities/atelectasis..    Skeleton/soft tissues: Unremarkable.    Impression:    Low lung volumes with bibasilar opacities/atelectasis      MARÍA GILLESPIE M.D., ATTENDING RADIOLOGIST  This document has been electronically signed. Jul 22 2018  7:45AM        < end of copied text >

## 2018-07-22 NOTE — PROGRESS NOTE ADULT - SUBJECTIVE AND OBJECTIVE BOX
PGY 3 Note:    Patient seen and evaluated at bedside, now on med-surg floor. Reports feeling much better, and feeling hungry at this time. Denies dizziness, lightheadedness, chest pain, shortness of breath, abdominal pain, vaginal bleeding, swelling, nausea, vomiting. Last episode of vomiting in the AM and has tolerated clears since then. Is passing flatus.     Vital Signs Last 24 Hrs  T(C): 35.7 (22 Jul 2018 17:22), Max: 37.8 (21 Jul 2018 22:20)  T(F): 96.2 (22 Jul 2018 17:22), Max: 100.1 (21 Jul 2018 22:20)  HR: 108 (22 Jul 2018 17:22) (96 - 126)  BP: 126/66 (22 Jul 2018 17:22) (113/81 - 183/74)  BP(mean): 111 (22 Jul 2018 15:00) (81 - 112)  RR: 20 (22 Jul 2018 17:22) (13 - 35)  SpO2: 92% (22 Jul 2018 15:00) (84% - 100%)    UO (6897-3789): 750cc -> 93.75cc/hr (min 52c/hr)    Gen: NAD, A&Ox3  Heart: S1S2, RRR, EMMANUEL  Lungs: CTABL  Abd: soft, moderately distended, pressure dressing in place with stable markings from yesterday unchanged and on mid-upper portion of dressing and left side of dressing as well, marked at 0745 stable, no CVA tenderness  VE: No blood  Ext: SCDs, no edema or calf tenderness    Labs:                          8.6    5.49  )-----------( 139      ( 22 Jul 2018 11:30 )             25.5     07-22    139  |  105  |  16  ----------------------------<  101<H>  4.1   |  26  |  0.6<L>    Ca    8.8      22 Jul 2018 11:30  Phos  2.3     07-22  Mg     2.0     07-22    PT/INR - ( 22 Jul 2018 11:30 )   PT: 13.70 sec;   INR: 1.27 ratio       PTT - ( 22 Jul 2018 11:30 )  PTT:34.2 sec    MEDICATIONS  (STANDING):  docusate sodium 100 milliGRAM(s) Oral two times a day  pantoprazole  Injectable 40 milliGRAM(s) IV Push daily    MEDICATIONS  (PRN):  acetaminophen   Tablet 650 milliGRAM(s) Oral every 6 hours PRN For Temp greater than 38 C (100.4 F)  acetaminophen   Tablet. 650 milliGRAM(s) Oral every 6 hours PRN Mild Pain (1 - 3)  diphenhydrAMINE   Capsule 25 milliGRAM(s) Oral every 4 hours PRN Rash and/or Itching  ondansetron Injectable 4 milliGRAM(s) IV Push every 6 hours PRN Nausea  ondansetron Injectable 4 milliGRAM(s) IV Push every 6 hours PRN Nausea and/or Vomiting  oxyCODONE    5 mG/acetaminophen 325 mG 1 Tablet(s) Oral every 4 hours PRN Moderate Pain (4 - 6)  oxyCODONE    5 mG/acetaminophen 325 mG 2 Tablet(s) Oral every 6 hours PRN Severe Pain (7 - 10)  simethicone 80 milliGRAM(s) Chew every 6 hours PRN Gas    Imaging:       Repeat EKG 7/22 Sinus Tachycardia  Bedside FAST neg    < from: US Retroperitoneal Complete (07.22.18 @ 00:48) >  EXAM:  US RETROPERITONEAL COMPLETE            PROCEDURE DATE:  07/22/2018        INTERPRETATION:  CLINICAL HISTORY: Decreased urine output, increased   creatinine    COMPARISON: None.    PROCEDURE: Retroperitoneal Ultrasound was performed.    FINDINGS:    RIGHT KIDNEY: Normal in echogenicity, size measuring 11.3 cm in length.   No evidence of hydronephrosis, calculus or solid mass. Vascular flow is   demonstrated at the hilum.    LEFT KIDNEY: Normal in echogenicity, size measuring 12.6 cm in length. No   evidence of hydronephrosis, calculus or solid mass. Vascular flow is   demonstrated at the hilum.     URINARY BLADDER: Collapsed around a Garcia catheter.    IMPRESSION:  Normal renal ultrasound.    Collapsed urinary bladder      MARÍA GILLESPIE M.D., ATTENDING RADIOLOGIST  This document has been electronically signed. Jul 22 2018  7:02AM    < end of copied text >    < from: Xray Chest 1 View- PORTABLE-Routine (07.22.18 @ 06:50)       EXAM:  XR CHEST PORTABLE ROUTINE 1V            PROCEDURE DATE:  07/22/2018        INTERPRETATION:    Clinical History / Reason for exam: Respiratory abnormality, ICU   decreased renal function, post uterine artery embolization    Comparison : Chest radiograph 7/10/2018    Technique/Positioning: Satisfactory.    Findings:    Support devices: None.    Cardiac/mediastinum/hilum: Magnified.    Lung parenchyma/Pleura: Low lung volumes with elevation of the right   hemidiaphragm and bibasilar opacities/atelectasis..    Skeleton/soft tissues: Unremarkable.    Impression:    Low lung volumes with bibasilar opacities/atelectasis      MARÍA GILLESPIE M.D., ATTENDING RADIOLOGIST  This document has been electronically signed. Jul 22 2018  7:45AM        < end of copied text >

## 2018-07-22 NOTE — PROGRESS NOTE ADULT - ASSESSMENT
41 yo P0 with h/o left breast cancer s/p L unilateral mastectomy, on tamoxifen (last dose 2 weeks ago), morbid obesity, s/p ZOILA BSO for fibroid uterus, EBL 400cc, POD2, s/p SICU admission for hyperkalemia with EKG changes increasing creatinine and decreased UO- now resolved,, s/p 2 units PRBC's for symptomatic anemia, downgraded to floor today, doing well  - Cont NS at 75cc/hr as per Nephro  - Cont arteaga catheter, monitor UO  - Strict I/O  - VS Q4h  - Bedrest with SCDs per PMD  - Regular diet  - D/C PCA  - PO pain meds (percocet/tylenol)  - Monitor abdominal dressing  - No Heparin or NSAIDS for now  - f/u AM labs ordered (cbc, bmp, mag, phos)    Dr. Reed aware

## 2018-07-22 NOTE — PROGRESS NOTE ADULT - SUBJECTIVE AND OBJECTIVE BOX
FARHANA ELLIS  [id number]  42y Female    Indication for ICU admission: Hyperkalemia   Admit Date:07-20-18  ICU Date: 7/21  OR Date: 7/21    Naprosyn (Nausea)    PAST MEDICAL & SURGICAL HISTORY:  Fibroids  Arthritis  Obese  Breast cancer: left , had surgery, then tamoxifen  History of surgery: fibroid embolization  H/O left mastectomy    Home Medications:  Aspir 81 oral delayed release tablet: 1 tab(s) orally once a day (at bedtime) (20 Jul 2018 10:35)  tamoxifen 20 mg oral tablet: 1 tab(s) orally once a day (at bedtime) (20 Jul 2018 10:35)        24HRS EVENT:  Neuro: Pain well controlled on Morphine PCA, no acute changes in neurological function or pain status.  Cards: tachycardica to 110s. Normotensive. EKG showed sinus tachycardia, patient with adequate urine output, NICOM  placed to evaluate fluid status and patient found to be behind, given 250cc bolus with minimal response, repeat hgbwas 8.8->8.6 given 1 unit and repeat ws 8.3, nicom reassessed and patietn was responisve to fluid challenge, hR decreased to 105 and patietn transfused 1 more unit of PRBC . DVT sono ordered to r/o DVT as possible source of tachycardia, appears negative, pending official read  Pulm: Breathing comfortably on room air. abg performed and was WNL  GI: NPO. PPI  : UOP variable, Cr trending downwards, fluids changed to NS @75cc/hr.  continuing to trend BUN and Cr. acidosis and hyperkalemia resolved. renal ultrasound performed pending read  ID: no issues, wbc trending downwards  lines: RUE peripheral IV placed x2.  Hem:  Hgb dropped to 8.3.  Held Hep SQ. Will trend for now. transfused 2 units  Endocrine: No issues. continue to monitor glucose levels       DVT PTX: SQH    GI PTX: Pantoprazole    ***Tubes/Lines/Drains  ***  Peripheral IV RUE    Urinary Catheter		Indication: Strict I&O    Date Placed: 7/21/18      REVIEW OF SYSTEMS    [x] A ten-point review of systems was otherwise negative except as noted.  [ ] Due to altered mental status/intubation, subjective information were not able to be obtained from the patient. History was obtained, to the extent possible, from review of the chart and collateral sources of information. FARHANA ELLIS  [id number]  42y Female    Indication for ICU admission: Hyperkalemia   Admit Date:07-20-18  ICU Date: 7/21  OR Date: 7/21    Naprosyn (Nausea)    PAST MEDICAL & SURGICAL HISTORY:  Fibroids  Arthritis  Obese  Breast cancer: left , had surgery, then tamoxifen  History of surgery: fibroid embolization  H/O left mastectomy    Home Medications:  Aspir 81 oral delayed release tablet: 1 tab(s) orally once a day (at bedtime) (20 Jul 2018 10:35)  tamoxifen 20 mg oral tablet: 1 tab(s) orally once a day (at bedtime) (20 Jul 2018 10:35)        24HRS EVENT:  Neuro: Pain well controlled on Morphine PCA, no acute changes in neurological function or pain status.  Cards: tachycardica to 110s. Normotensive. EKG showed sinus tachycardia, patient with adequate urine output, NICOM  placed to evaluate fluid status and patient found to be behind, given 250cc bolus with minimal response, repeat hgbwas 8.8->8.6 given 1 unit and repeat ws 8.3, nicom reassessed and patietn was responisve to fluid challenge, hR decreased to 105 and patietn transfused 1 more unit of PRBC and additional 250cc NS bolus . DVT sono ordered to r/o DVT as possible source of tachycardia, appears negative, pending official read  Pulm: Breathing comfortably on room air. abg performed and was WNL  GI: NPO. PPI  : UOP variable, Cr trending downwards, fluids changed to NS @75cc/hr.  continuing to trend BUN and Cr. acidosis and hyperkalemia resolved. renal ultrasound performed pending read  ID: no issues, wbc trending downwards  lines: RUE peripheral IV placed x2.  Hem:  Hgb dropped to 8.3.  Held Hep SQ. Will trend for now. transfused 2 units  Endocrine: No issues. continue to monitor glucose levels       DVT PTX: SQH    GI PTX: Pantoprazole    ***Tubes/Lines/Drains  ***  Peripheral IV RUE    Urinary Catheter		Indication: Strict I&O    Date Placed: 7/21/18      REVIEW OF SYSTEMS    [x] A ten-point review of systems was otherwise negative except as noted.  [ ] Due to altered mental status/intubation, subjective information were not able to be obtained from the patient. History was obtained, to the extent possible, from review of the chart and collateral sources of information. FARHANA ELLIS  [id number]  42y Female    Indication for ICU admission: Hyperkalemia   Admit Date:18  ICU Date:   OR Date:     Naprosyn (Nausea)    PAST MEDICAL & SURGICAL HISTORY:  Fibroids  Arthritis  Obese  Breast cancer: left , had surgery, then tamoxifen  History of surgery: fibroid embolization  H/O left mastectomy    Home Medications:  Aspir 81 oral delayed release tablet: 1 tab(s) orally once a day (at bedtime) (2018 10:35)  tamoxifen 20 mg oral tablet: 1 tab(s) orally once a day (at bedtime) (2018 10:35)        24HRS EVENT:  Neuro: Pain well controlled on Morphine PCA, no acute changes in neurological function or pain status.  Cards: tachycardica to 120s. Normotensive. EKG showed sinus tachycardia, patient with adequate urine output, NICOM  placed to evaluate fluid status and patient found to be behind, given 250cc bolus with minimal response, repeat hgbwas 8.8->8.6 given 1 unit and repeat ws 8.3, nicom reassessed and patietn was responisve to fluid challenge, hR decreased to 105 and patietn transfused 1 more unit of PRBC and additional 250cc NS bolus . DVT sono ordered to r/o DVT as possible source of tachycardia, appears negative, pending official read  Pulm: Breathing comfortably on room air. abg performed and was WNL  GI: NPO. PPI  : UOP variable, Cr trending downwards, fluids changed to NS @75cc/hr.  continuing to trend BUN and Cr. acidosis and hyperkalemia resolved. renal ultrasound performed pending read  ID: no issues, wbc trending downwards  lines: RUE peripheral IV placed x2.  Hem:  Hgb dropped to 8.3.  Held Hep SQ. Will trend for now. transfused 2 units  Endocrine: No issues. continue to monitor glucose levels       DVT PTX: SQH    GI PTX: Pantoprazole    ***Tubes/Lines/Drains  ***  Peripheral IV RUE    Urinary Catheter		Indication: Strict I&O    Date Placed: 18      REVIEW OF SYSTEMS    [x] A ten-point review of systems was otherwise negative except as noted.  [ ] Due to altered mental status/intubation, subjective information were not able to be obtained from the patient. History was obtained, to the extent possible, from review of the chart and collateral sources of information.          Daily     Daily Weight in k.7 (2018 05:00)    Diet, NPO:   Except Medications (18 @ 09:39)      CURRENT MEDS:  Neurologic Medications  acetaminophen   Tablet 650 milliGRAM(s) Oral every 6 hours PRN For Temp greater than 38 C (100.4 F)  diphenhydrAMINE   Capsule 25 milliGRAM(s) Oral every 4 hours PRN Rash and/or Itching  morphine PCA (5 mG/mL) 30 milliLiter(s) PCA Continuous PCA Continuous  ondansetron Injectable 4 milliGRAM(s) IV Push every 6 hours PRN Nausea and/or Vomiting    Respiratory Medications    Cardiovascular Medications    Gastrointestinal Medications  docusate sodium 100 milliGRAM(s) Oral two times a day  pantoprazole  Injectable 40 milliGRAM(s) IV Push daily  simethicone 80 milliGRAM(s) Chew every 6 hours PRN Gas  sodium chloride 0.9% Bolus 250 milliLiter(s) IV Bolus once  sodium chloride 0.9%. 1000 milliLiter(s) IV Continuous <Continuous>    Genitourinary Medications    Hematologic/Oncologic Medications    Antimicrobial/Immunologic Medications    Endocrine/Metabolic Medications    Topical/Other Medications  naloxone Injectable 0.1 milliGRAM(s) IV Push every 3 minutes PRN For ANY of the following changes in patient status:  A. RR LESS THAN 10 breaths per minute, B. Oxygen saturation LESS THAN 90%, C. Sedation score of 6      ICU Vital Signs Last 24 Hrs  T(C): 37.2 (2018 08:00), Max: 37.8 (2018 17:00)  T(F): 98.9 (2018 08:00), Max: 100.1 (2018 22:20)  HR: 104 (2018 08:00) (102 - 126)  BP: 152/64 (2018 08:00) (113/81 - 177/60)  BP(mean): 86 (2018 06:00) (83 - 104)  ABP: --  ABP(mean): --  RR: 28 (2018 08:00) (11 - 28)  SpO2: 99% (2018 08:00) (92% - 100%)      Adult Advanced Hemodynamics Last 24 Hrs  CVP(mm Hg): --  CVP(cm H2O): --  CO: 9.4 (2018 18:30) (7.5 - 9.4)  CI: 4.3 (2018 18:30) (3.5 - 4.3)  PA: --  PA(mean): --  PCWP: --  SVR: 996 (2018 18:30) (996 - 996)  SVRI: --  PVR: --  PVRI: --      ABG - ( 2018 13:19 )  pH, Arterial: 7.32  pH, Blood: x     /  pCO2: 44    /  pO2: 88    / HCO3: 22    / Base Excess: -3.6  /  SaO2: 98                  I&O's Summary    2018 07:  -  2018 07:00  --------------------------------------------------------  IN: 3942 mL / OUT: 1930 mL / NET:  mL    2018 07:  -  2018 10:00  --------------------------------------------------------  IN: 475 mL / OUT: 295 mL / NET: 180 mL      I&O's Detail    2018 07:  -  2018 07:00  --------------------------------------------------------  IN:    dextrose 5% + sodium chloride 0.9%: 250 mL    IV PiggyBack: 1250 mL    Packed Red Blood Cells: 567 mL    sodium chloride 0.45%: 1125 mL    sodium chloride 0.9%.: 750 mL  Total IN: 3942 mL    OUT:    Indwelling Catheter - Urethral: 1930 mL  Total OUT: 1930 mL    Total NET:  mL      2018 07:  -  2018 10:00  --------------------------------------------------------  IN:    Sodium Chloride 0.9% IV Bolus: 250 mL    sodium chloride 0.9%.: 225 mL  Total IN: 475 mL    OUT:    Indwelling Catheter - Urethral: 295 mL  Total OUT: 295 mL    Total NET: 180 mL          PHYSICAL EXAM:    General/Neuro NAD  RASS:     0        GCS:     = E 4  / V 5  / M 6     Deficits:            alert & oriented x 3, no focal deficits  Pupils: reactive    Lungs:      clear to auscultation, Normal expansion/effort.     Cardiovascular : S1, S2.  significant  Peripheral edema, sinus tchycarida appears to have resolved     GI: Abdomen soft, large midline dressing in place with saturation tracked, appears to be stable, periincisional tenderness      Extremities: Extremities warm, pink, well-perfused. Pulses:Rt     Lt    Derm: Good skin turgor, no skin breakdown.      :       Garcia catheter in place.      CXR: < from: Xray Chest 1 View- PORTABLE-Routine (18 @ 06:50) >  Low lung volumes with bibasilar opacities/atelectasis    < end of copied text >  < from: US Retroperitoneal Complete (18 @ 00:48) >  Normal renal ultrasound.    Collapsed urinary bladder      < end of copied text >        LABS:  CAPILLARY BLOOD GLUCOSE  126 (2018 04:13)  120 (2018 00:53)  113 (2018 20:15)                              8.4    5.51  )-----------( 126      ( 2018 07:58 )             24.7       22    132<L>  |  101  |  22<H>  ----------------------------<  116<H>  4.4   |  24  |  0.8    Ca    8.4<L>      2018 01:14  Phos  2.3     22  Mg     1.9     22        PT/INR - ( 2018 20:00 )   PT: 14.30 sec;   INR: 1.33 ratio         PTT - ( 2018 20:00 )  PTT:33.0 sec  CARDIAC MARKERS ( 2018 16:20 )  x     / <0.01 ng/mL / 101 U/L / x     / x      CARDIAC MARKERS ( 2018 05:30 )  x     / <0.01 ng/mL / 72 U/L / x     / 2.2 ng/mL

## 2018-07-22 NOTE — PROGRESS NOTE ADULT - SUBJECTIVE AND OBJECTIVE BOX
seen and examined  no new complaints       Standing Inpatient Medications  docusate sodium 100 milliGRAM(s) Oral two times a day  morphine PCA (5 mG/mL) 30 milliLiter(s) PCA Continuous PCA Continuous  pantoprazole  Injectable 40 milliGRAM(s) IV Push daily  sodium chloride 0.9% Bolus 250 milliLiter(s) IV Bolus once  sodium chloride 0.9%. 1000 milliLiter(s) IV Continuous <Continuous>        VITALS/PHYSICAL EXAM  --------------------------------------------------------------------------------  T(C): 37.2 (07-22-18 @ 08:00), Max: 37.8 (07-21-18 @ 17:00)  HR: 104 (07-22-18 @ 08:00) (102 - 126)  BP: 152/64 (07-22-18 @ 08:00) (113/81 - 177/60)  RR: 28 (07-22-18 @ 08:00) (9 - 28)  SpO2: 99% (07-22-18 @ 08:00) (92% - 100%)  Wt(kg): --    Weight (kg): 104.3 (07-20-18 @ 11:24)      07-21-18 @ 07:01  -  07-22-18 @ 07:00  --------------------------------------------------------  IN: 3942 mL / OUT: 1930 mL / NET: 2012 mL      Physical Exam:  	Gen: NAD  	Pulm:decrease BS  B/L  	CV: S1S2; no rub  	Abd: +distended  	: chandler   	LE:  edema  	    LABS/STUDIES  --------------------------------------------------------------------------------              8.4    5.51  >-----------<  126      [07-22-18 @ 07:58]              24.7     132  |  101  |  22  ----------------------------<  116      [07-22-18 @ 01:14]  4.4   |  24  |  0.8        Ca     8.4     [07-22-18 @ 01:14]      Mg     1.9     [07-22-18 @ 01:14]      Phos  2.3     [07-22-18 @ 01:14]      PT/INR: PT 14.30, INR 1.33       [07-21-18 @ 20:00]  PTT: 33.0       [07-21-18 @ 20:00]    Troponin <0.01      [07-21-18 @ 16:20]        [07-21-18 @ 16:20]    Creatinine Trend:  SCr 0.8 [07-22 @ 01:14]  SCr 0.9 [07-21 @ 20:00]  SCr 1.0 [07-21 @ 16:20]  SCr 1.1 [07-21 @ 11:45]  SCr 1.5 [07-21 @ 02:50]    Urinalysis - [07-10-18 @ 20:50]      Color Red / Appearance Turbid / SG 1.025 / pH 5.5      Gluc Negative / Ketone Trace  / Bili Moderate / Urobili 0.2       Blood Large / Protein 100 / Leuk Est Small / Nitrite Negative      RBC >50 / WBC 3-5 / Hyaline  / Gran  / Sq Epi  / Non Sq Epi Moderate / Bacteria

## 2018-07-22 NOTE — CHART NOTE - NSCHARTNOTEFT_GEN_A_CORE
Patient seen and examined at the bedside  She is comfortable, denies CP/SOB; Denies worsening abdominal distention or abdominal pain; states she has passed flatus [place]      Physical examination:  VS: HR: decreased from 110-120's yesterday to 100-105 after the 2 units of PRBC transfusion  BP: 150-160's/50/60's   Abdomen distended but soft, unchanged when compared with yesterday's examination  Dressing: dry, blood stain hang unchanged compared with yesterday  Urine clear in arteaga bag    Most recent labs:  7/22/2018, 7:58am: hgb 8.4; PLt 126  7/22/2018, 1:14 am: creat 0.8    Impression:  Clinically the patient has remained stable in the last 24 hrs, with improvement in her tachycardia after the 2 u prbc and an excellent urine output, and decreasing creatinine level. The abdominal exam is unchanged with no suspicion of worsening abdominal distention.   The hgb level did not increase after the 2 units of prbc, which keeps the concern for possible ongoing bleeding, although this could also be due to the fluid shifts related to the intense IV resuscitation she has received in the last 24 hrs.     Given the overall stable condition, I favor at this point continued expectant management with repeat labs in 4-6 hrs. Further management decisions (transfusion vs. exploration) will depend on the results at that point and the overall clinical condition of the patient.    I explained the current clinical findings and the plan of care to the patient and to her sister over the phone.   I also discussed my assessment and suggested management recommendations with the SICU team.

## 2018-07-22 NOTE — CHART NOTE - NSCHARTNOTEFT_GEN_A_CORE
POD#2 s/p TAHBSO with postop JUAN DIEGO  Admitted to ICU with worsening renal function post op, hyperkalemia and anemia.  Pt was initially acidotic, and renal c/s done. After multiple fluid boluses UO improved, hyperkalemia resolved and creatnine decreased to 0.9 (peaked at 1.5)  Pt remains on NS @75cc/hr per renal recomm.  Pt also became anemic, tachycardic and eventually transfused 2 units RBC's when Hgb dropped to 8.3.  Pt had NICOM placed to check for fluid responsiveness and bolused 250cc NS x 3.   This morning Hgb 8.6 and tachycardia improved.   Diet advanced to clear liquids, arteaga left in to be addressed by primary team.  Hemodynamically stable for tx to floor  Signed out to Ob/gyn resident Jenifer bryson [phone]. POD#2 s/p TAHBSO with postop JUAN DIEGO  Admitted to ICU with worsening renal function post op, hyperkalemia and anemia.  Pt was initially acidotic, and renal c/s done. After multiple fluid boluses UO improved, hyperkalemia resolved and creatnine decreased to 0.9 (peaked at 1.5)  Pt remains on NS @75cc/hr per renal recomm.  Pt also became anemic, tachycardic and eventually transfused 2 units RBC's when Hgb dropped to 8.3.  Pt had NICOM placed to check for fluid responsiveness and bolused 250cc NS x 3.   This morning Hgb 8.6 and tachycardia improved.   Diet advanced to clear liquids, arteaga left in to be addressed by primary team.  Hemodynamically stable for tx to floor  Signed out to Ob/gyn resident Jenifer Licona x [phone].

## 2018-07-22 NOTE — PROGRESS NOTE ADULT - ASSESSMENT
43yo P0 with h/o left breast cancer s/p L unilateral mastectomy, on tamoxifen (last dose 2 weeks ago), morbid obesity, s/p ZOILA BSO for fibroid uterus, EBL 400cc, POD2, upgraded to SICU for close monitoring for JUAN DIEGO (now resolved) and hyperkalemia (now resolved) and now with H/H drop due to acute blood loss anemia, possible stable hematoma, s/p 2u PRBC with improved tachycardia and urine output, with midline IV access    -potassium continues to be normal and Cr continuing to trend down   -s/p nephro consult - f/u renal/bladder sono  -monitor urine output - IV fluids decreased by SICU to 75cc/hr with IV boluses as needed   -keep same pressure dressing on for now as staining is stable   -monitor VS  -pt has difficult access with left arm precautions, SICU team placed midline   -management per SICU      Dr. Reed to be made aware. 43yo P0 with h/o left breast cancer s/p L unilateral mastectomy, on tamoxifen (last dose 2 weeks ago), morbid obesity, s/p ZOILA BSO for fibroid uterus, EBL 400cc, POD2, upgraded to SICU for close monitoring for JUAN DIEGO (now resolved) and hyperkalemia (now resolved) and now with H/H drop due to acute blood loss anemia, possible stable hematoma, s/p 2u PRBC with improved tachycardia and urine output  -potassium continues to be normal and Cr continuing to trend down   -s/p nephro consult - f/u renal/bladder sono  -monitor urine output - IV fluids decreased by SICU to 75cc/hr with IV boluses as needed   -keep same pressure dressing on for now as staining is stable   -monitor VS  -pt has difficult access with left arm precautions, SICU team placed midline   -management per SICU      Dr. Reed to be made aware.

## 2018-07-22 NOTE — PROGRESS NOTE ADULT - ASSESSMENT
43yo P0 with h/o left breast cancer s/p unilateral mastectomy, on tamoxifen (last dose 2 weeks ago), morbid obesity, s/p ZOILA BSO for fibroid uterus, EBL 400cc, POD2, upgraded to SICU for close monitoring for JUAN DIEGO and hyperkalemia and now with H/H drop due to acute blood loss anemia, possible stable hematoma, s/p 1u PRBC with improved tachycardia and urine output, to be transfused 2nd unit.   -potassium continues to be normal and Cr continuing to trend down   -s/p nephro consult - f/u renal/bladder sono  -monitor urine output - IV fluids decreased by SICU to 75cc/hr with IV boluses as needed   -keep same pressure dressing on for now as staining is stable   -monitor VS  -pt has difficult access with left arm precautions, SICU team unable to place midline, consider PICC line tomorrow if needed   -management per SICU    Dr. Reed to be made aware. 41yo P0 with h/o left breast cancer s/p unilateral mastectomy, on tamoxifen (last dose 2 weeks ago), morbid obesity, s/p ZOILA BSO for fibroid uterus, EBL 400cc, POD2, upgraded to SICU for close monitoring for JUAN DIEGO and hyperkalemia and now with H/H drop due to acute blood loss anemia, possible stable hematoma, s/p 1u PRBC with improved tachycardia and urine output, to be transfused 2nd unit.   -potassium continues to be normal and Cr continuing to trend down   -s/p nephro consult - f/u renal/bladder sono  -monitor urine output - IV fluids decreased by SICU to 75cc/hr with IV boluses as needed   -keep same pressure dressing on for now as staining is stable   -monitor VS  -pt has difficult access with left arm precautions, SICU team unable to place midline, consider PICC line tomorrow if needed   -management per SICU  -reassess after 2nd unit of PRBC    Dr. Reed to be made aware.

## 2018-07-22 NOTE — PROGRESS NOTE ADULT - ASSESSMENT
ASSESSMENT:  42y Female POD 2 from Total abdominal hysterectomy and bilateral salpingo-oophorectomy     PLAN:   Neurologic: Pain control with morphine PCA  Respiratory: IS   Cardiovascular: Monitor HR, tachycardia appears to be resolving after prbcx2 and fluid boluses, will continue to monitor and bolus as needed  Gastrointestinal/Nutrition: Pantoprazole, NPO  Renal/Genitourinary: Closely monitor urine output, currently table with improving renal function  Hematologic: HSQ held until active bleed is ruled out  Infectious Disease: No issues.  Endocrine: Monitor glucose levels  Disposition: SICU ASSESSMENT:  42y Female POD 2 from Total abdominal hysterectomy and bilateral salpingo-oophorectomy     PLAN:   Neurologic: Pain control with morphine PCA  Respiratory: IS, satting well on room air  Cardiovascular: Monitor HR, tachycardia appears to be resolving after prbcx2 and fluid boluses, will continue to monitor and bolus as needed  Gastrointestinal/Nutrition: Pantoprazole, NPO, plan to advance diet today if repeat CBC is stable  Renal/Genitourinary: Closely monitor urine output, currently stable with improving renal function  Hematologic: HSQ held until active bleed is ruled out, repeat CBC at 11am to monitor response to PRBC  Infectious Disease: No issues.  Endocrine: Monitor glucose levels  Disposition: Downgrade if repeat CBC is stable

## 2018-07-23 LAB
ANION GAP SERPL CALC-SCNC: 7 MMOL/L — SIGNIFICANT CHANGE UP (ref 7–14)
BUN SERPL-MCNC: 13 MG/DL — SIGNIFICANT CHANGE UP (ref 10–20)
CALCIUM SERPL-MCNC: 9.1 MG/DL — SIGNIFICANT CHANGE UP (ref 8.5–10.1)
CHLORIDE SERPL-SCNC: 103 MMOL/L — SIGNIFICANT CHANGE UP (ref 98–110)
CO2 SERPL-SCNC: 28 MMOL/L — SIGNIFICANT CHANGE UP (ref 17–32)
CREAT SERPL-MCNC: 0.6 MG/DL — LOW (ref 0.7–1.5)
GLUCOSE SERPL-MCNC: 122 MG/DL — HIGH (ref 70–99)
HCT VFR BLD CALC: 26.7 % — LOW (ref 37–47)
HGB BLD-MCNC: 9 G/DL — LOW (ref 12–16)
MAGNESIUM SERPL-MCNC: 2 MG/DL — SIGNIFICANT CHANGE UP (ref 1.8–2.4)
MCHC RBC-ENTMCNC: 29.7 PG — SIGNIFICANT CHANGE UP (ref 27–31)
MCHC RBC-ENTMCNC: 33.7 G/DL — SIGNIFICANT CHANGE UP (ref 32–37)
MCV RBC AUTO: 88.1 FL — SIGNIFICANT CHANGE UP (ref 81–99)
NRBC # BLD: 0 /100 WBCS — SIGNIFICANT CHANGE UP (ref 0–0)
PHOSPHATE SERPL-MCNC: 2 MG/DL — LOW (ref 2.1–4.9)
PLATELET # BLD AUTO: 128 K/UL — LOW (ref 130–400)
POTASSIUM SERPL-MCNC: 4.1 MMOL/L — SIGNIFICANT CHANGE UP (ref 3.5–5)
POTASSIUM SERPL-SCNC: 4.1 MMOL/L — SIGNIFICANT CHANGE UP (ref 3.5–5)
RBC # BLD: 3.03 M/UL — LOW (ref 4.2–5.4)
RBC # FLD: 13.6 % — SIGNIFICANT CHANGE UP (ref 11.5–14.5)
SODIUM SERPL-SCNC: 138 MMOL/L — SIGNIFICANT CHANGE UP (ref 135–146)
WBC # BLD: 5.06 K/UL — SIGNIFICANT CHANGE UP (ref 4.8–10.8)
WBC # FLD AUTO: 5.06 K/UL — SIGNIFICANT CHANGE UP (ref 4.8–10.8)

## 2018-07-23 RX ORDER — AMLODIPINE BESYLATE 2.5 MG/1
5 TABLET ORAL DAILY
Qty: 0 | Refills: 0 | Status: DISCONTINUED | OUTPATIENT
Start: 2018-07-24 | End: 2018-07-24

## 2018-07-23 RX ORDER — FUROSEMIDE 40 MG
20 TABLET ORAL ONCE
Qty: 0 | Refills: 0 | Status: COMPLETED | OUTPATIENT
Start: 2018-07-23 | End: 2018-07-23

## 2018-07-23 RX ORDER — AMLODIPINE BESYLATE 2.5 MG/1
5 TABLET ORAL ONCE
Qty: 0 | Refills: 0 | Status: COMPLETED | OUTPATIENT
Start: 2018-07-23 | End: 2018-07-23

## 2018-07-23 RX ORDER — FUROSEMIDE 40 MG
20 TABLET ORAL
Qty: 0 | Refills: 0 | Status: DISCONTINUED | OUTPATIENT
Start: 2018-07-24 | End: 2018-07-24

## 2018-07-23 RX ADMIN — Medication 100 MILLIGRAM(S): at 17:30

## 2018-07-23 RX ADMIN — Medication 100 MILLIGRAM(S): at 05:49

## 2018-07-23 RX ADMIN — OXYCODONE AND ACETAMINOPHEN 2 TABLET(S): 5; 325 TABLET ORAL at 13:16

## 2018-07-23 RX ADMIN — AMLODIPINE BESYLATE 5 MILLIGRAM(S): 2.5 TABLET ORAL at 16:09

## 2018-07-23 RX ADMIN — Medication 650 MILLIGRAM(S): at 02:04

## 2018-07-23 RX ADMIN — PANTOPRAZOLE SODIUM 40 MILLIGRAM(S): 20 TABLET, DELAYED RELEASE ORAL at 13:15

## 2018-07-23 RX ADMIN — Medication 650 MILLIGRAM(S): at 04:44

## 2018-07-23 RX ADMIN — Medication 650 MILLIGRAM(S): at 21:53

## 2018-07-23 RX ADMIN — Medication 20 MILLIGRAM(S): at 16:09

## 2018-07-23 RX ADMIN — OXYCODONE AND ACETAMINOPHEN 1 TABLET(S): 5; 325 TABLET ORAL at 23:59

## 2018-07-23 NOTE — PROGRESS NOTE ADULT - SUBJECTIVE AND OBJECTIVE BOX
PGY4 GYN Progress Note    POD #3  Procedure:  ZOILA-BSO for fibroid uterus    Subjective: Patient is doing well. Pain well controlled on PO meds. Patient is not ambulating yet. Garcia draining clear urine, adequate urine output. She is tolerating regular diet, passing flatus. No bowel movements.     Physical exam:    Vital Signs Last 24 Hrs  T(C): 36.7 (23 Jul 2018 04:02), Max: 37.2 (22 Jul 2018 08:00)  T(F): 98.1 (23 Jul 2018 04:02), Max: 98.9 (22 Jul 2018 08:00)  HR: 92 (23 Jul 2018 04:02) (92 - 108)  BP: 156/71 (23 Jul 2018 04:02) (126/66 - 183/74)  BP(mean): 111 (22 Jul 2018 15:00) (81 - 112)  RR: 18 (23 Jul 2018 04:02) (15 - 35)  SpO2: 93% (23 Jul 2018 02:27) (84% - 99%)      07-21-18 @ 07:01  -  07-22-18 @ 07:00  --------------------------------------------------------  IN: 3942 mL / OUT: 1930 mL / NET: 2012 mL    07-22-18 @ 07:01  -  07-23-18 @ 06:30  --------------------------------------------------------  IN: 1500 mL / OUT: 1825 mL / NET: -325 mL    Urine output - 350cc from 3305-9440: 63cc/h    Gen: NAD, alert and oriented  CVS: RRR s1/s2 no murmurs  Lungs: CTABL  Abdomen: BS+, soft, not tender, mildly distended, dressing intact no new stains  Pelvic: deferred, no blood in the pad  Ext: No calf tenderness or edema, SCDs in place    Diet: regular     Meds:     acetaminophen   Tablet   650 milliGRAM(s) Oral (07-23-18 @ 04:44)    acetaminophen   Tablet.   650 milliGRAM(s) Oral (07-23-18 @ 02:04)   650 milliGRAM(s) Oral (07-22-18 @ 19:48)    docusate sodium   100 milliGRAM(s) Oral (07-23-18 @ 05:49)   100 milliGRAM(s) Oral (07-22-18 @ 18:17)    oxyCODONE    5 mG/acetaminophen 325 mG   1 Tablet(s) Oral (07-22-18 @ 21:01)    pantoprazole  Injectable   40 milliGRAM(s) IV Push (07-22-18 @ 13:23)        LABS:                        8.6    5.49  )-----------( 139      ( 22 Jul 2018 11:30 )             25.5                         8.4    5.51  )-----------( 126      ( 22 Jul 2018 07:58 )             24.7                         8.3    5.69  )-----------( 103      ( 22 Jul 2018 02:26 )             25.4                         8.3    5.77  )-----------( 137      ( 22 Jul 2018 01:14 )             25.3                         8.6    7.51  )-----------( 200      ( 21 Jul 2018 20:00 )             25.5                         8.8    6.43  )-----------( 191      ( 21 Jul 2018 16:20 )             26.2     Magnesium, Serum: 2.0 mg/dL (07-22 @ 11:30)    07-22-18 @ 11:30      139  |  105  |  16  ----------------------------<  101<H>  4.1   |  26  |  0.6<L>    07-22-18 @ 01:14      132<L>  |  101  |  22<H>  ----------------------------<  116<H>  4.4   |  24  |  0.8    07-21-18 @ 20:00      135  |  102  |  25<H>  ----------------------------<  109<H>  4.6   |  22  |  0.9    07-21-18 @ 16:20      135  |  102  |  26<H>  ----------------------------<  110<H>  4.5   |  21  |  1.0    07-21-18 @ 11:45      134<L>  |  104  |  26<H>  ----------------------------<  142<H>  4.9   |  18  |  1.1    07-21-18 @ 02:50      138  |  105  |  25<H>  ----------------------------<  163<H>  6.6<HH>   |  20  |  1.5    07-20-18 @ 23:10      136  |  101  |  21<H>  ----------------------------<  135<H>  6.3<HH>   |  19  |  1.1        Ca    8.8      22 Jul 2018 11:30  Ca    8.4<L>      22 Jul 2018 01:14  Ca    8.2<L>      21 Jul 2018 20:00  Ca    8.4<L>      21 Jul 2018 16:20  Ca    8.1<L>      21 Jul 2018 11:45  Ca    8.1<L>      21 Jul 2018 02:50  Ca    9.0      20 Jul 2018 23:10  Phos  2.3     07-22  Phos  3.8     07-21  Mg     2.0     07-22  Mg     1.9     07-22  Mg     1.9     07-21  Mg     1.7<L>     07-20            Allergies    Naprosyn (Nausea)    Intolerances

## 2018-07-23 NOTE — PROGRESS NOTE ADULT - ASSESSMENT
41yo P0 with h/o left breast cancer s/p L unilateral mastectomy, morbid obesity, s/p ZOILA BSO for fibroid uterus, EBL 400cc, POD3, s/p SICU admission due to JUAN DIEGO (now resolved) and hyperkalemia (now resolved), with H/H drop due to acute blood loss anemia, possible stable hematoma, s/p 2u PRBC with stable H/H at the moment  - f/u AM labs  - UO is good, will discuss arteaga removal with PMD  - dressing change today  - monitor VS  - Continue regular diet  - PO pain management with tylenol/percocet (no NSAIDs)  - DVT prophylaxis with SCD, will discuss ambulation and restarting heparin with PMD        WIll inform Dr Reed.

## 2018-07-23 NOTE — PROGRESS NOTE ADULT - SUBJECTIVE AND OBJECTIVE BOX
PGY2 Note:    Called by RN for /94. Pt evaluated at bedside, pain completely controlled. Denies HA, vision changes, chest pain, SOB, or abdominal pain. Was recently moved OOB to chair, currently feeling good, no complaints.    Was informed by [facility] that pathology resident stuck himself with scalpel while preparing pt's surgical specimen. Pt informed, consent obtained for needlestick profile, specimens collected and sent to lab.    Exam  Repeat BP performed at bedside 191/88, HR 98.  Gen: AAOx3, NAD, well appearing  Chest: Lungs CTA BL, S1S2 RRR      Plan:  Nephrology called for antihypertensive recommendations  Will repeat BP  F/u needlestick profile

## 2018-07-23 NOTE — PROGRESS NOTE ADULT - SUBJECTIVE AND OBJECTIVE BOX
Nephrology progress note    Patient was seen and examined, events over the last 24 h noted .  Called to jorge alberto pt w/ HTN 's  Denies hx of HTN  reports feels anxious but not pain    Allergies:  Naprosyn (Nausea)    Hospital Medications:   MEDICATIONS  (STANDING):  docusate sodium 100 milliGRAM(s) Oral two times a day  pantoprazole  Injectable 40 milliGRAM(s) IV Push daily        VITALS:  T(F): 97.2 (07-23-18 @ 12:06), Max: 98.2 (07-23-18 @ 08:00)  HR: 101 (07-23-18 @ 15:23)  BP: 190/120 (07-23-18 @ 15:23)  RR: 20 (07-23-18 @ 15:23)  SpO2: 93% (07-23-18 @ 02:27)  Wt(kg): --    07-21 @ 07:01  -  07-22 @ 07:00  --------------------------------------------------------  IN: 3942 mL / OUT: 1930 mL / NET: 2012 mL    07-22 @ 07:01  -  07-23 @ 07:00  --------------------------------------------------------  IN: 1500 mL / OUT: 1825 mL / NET: -325 mL    07-23 @ 07:01  -  07-23 @ 15:31  --------------------------------------------------------  IN: 450 mL / OUT: 850 mL / NET: -400 mL          PHYSICAL EXAM:  Physical Exam:  	Gen: NAD  	Pulm: decrease BS  B/L  	CV:  S1S2; no rub  	Abd: dressing   	: No suprapubic tenderness  	LE:trace edema    LABS:  07-23    138  |  103  |  13  ----------------------------<  122<H>  4.1   |  28  |  0.6<L>    Ca    9.1      23 Jul 2018 07:26  Phos  2.0     07-23  Mg     2.0     07-23                            9.0    5.06  )-----------( 128      ( 23 Jul 2018 07:26 )             26.7       Urine Studies:      RADIOLOGY & ADDITIONAL STUDIES:

## 2018-07-23 NOTE — CONSULT NOTE ADULT - ASSESSMENT
IMPRESSION: Rehab of gait dysfunction      PRECAUTIONS: [  ] Cardiac  [  ] Respiratory  [  ] Seizures [  ] Contact Isolation  [  ] Droplet Isolation  [  ] Other    Weight Bearing Status:     RECOMMENDATION:    Out of Bed to Chair     DVT/Decubiti Prophylaxis    REHAB PLAN:     [ x  ] Bedside P/T 3-5 times a week   [   ]   Bedside O/T  2-3 times a week             [   ] No Rehab Therapy Indicated                   [   ]  Speech Therapy   Conditioning/ROM                                    ADL  Bed Mobility                                               Conditioning/ROM  Transfers                                                     Bed Mobility  Sitting /Standing Balance                         Transfers                                        Gait Training                                               Sitting/Standing Balance  Stair Training [   ]Applicable                    Home equipment Eval                                                                        Splinting  [   ] Only      GOALS:   ADL   [ x  ]   Independent                    Transfers  [ x  ] Independent                          Ambulation  [ x  ] Independent     [  x  ] With device                            [   ]  CG                                                         [   ]  CG                                                                  [   ] CG                            [    ] Min A                                                   [   ] Min A                                                              [   ] Min  A          DISCHARGE PLAN:   [   ]  Good candidate for Intensive Rehabilitation/Hospital based-[facility]A [facility]                                             Will tolerate 3hrs Intensive Rehab Daily                                       [    ]  Short Term Rehab in Skilled Nursing Facility                                       [  x  ]  Home with Outpatient or VN services                                         [    ]  Possible Candidate for Intensive Hospital based Rehab
42 year old female with a PMH of Breast Ca in 2017 now POD 1 s/p Total abdominal hysterectomy and bilateral salpingo-oophorectomy for Large 30w size uterus with multiple fibroids (intramural, subserosal, pedunculated) called for JUAN DIEGO with hyperkalemia:  # prerenal versus ATN , episode of hypotension  # IV fluids : 1/2 ns with 75 meq of biacrbonate at 125 cc/h  # receiving bolus now  # UO 15 to 20 cc /h   # check repeat K stat  # check renal  and bladder scan   # case discussed with SICU team

## 2018-07-23 NOTE — PROGRESS NOTE ADULT - ASSESSMENT
42 year old female with a PMH of Breast Ca in 2017 now POD 1 s/p Total abdominal hysterectomy and bilateral salpingo-oophorectomy for Large 30w size uterus with multiple fibroids (intramural, subserosal, pedunculated) complicated by  JUAN DIEGO with hyperkalemia now resolved    now w/ HTN 's    She liekly has underlying essential HTn, worsened by pain/anxiety and IVF she got postop and in setting of JUAN DIEGO    Start amlodipine 5 mg  Start IV lasix 20 mg IV BID and monitor response   - trend Cr , monitor UOP   -ensure pain adequately controlled     -

## 2018-07-24 LAB
ANION GAP SERPL CALC-SCNC: 14 MMOL/L — SIGNIFICANT CHANGE UP (ref 7–14)
BUN SERPL-MCNC: 13 MG/DL — SIGNIFICANT CHANGE UP (ref 10–20)
CALCIUM SERPL-MCNC: 10.1 MG/DL — SIGNIFICANT CHANGE UP (ref 8.5–10.1)
CHLORIDE SERPL-SCNC: 100 MMOL/L — SIGNIFICANT CHANGE UP (ref 98–110)
CO2 SERPL-SCNC: 26 MMOL/L — SIGNIFICANT CHANGE UP (ref 17–32)
CREAT SERPL-MCNC: 0.7 MG/DL — SIGNIFICANT CHANGE UP (ref 0.7–1.5)
GLUCOSE SERPL-MCNC: 135 MG/DL — HIGH (ref 70–99)
HCT VFR BLD CALC: 33.1 % — LOW (ref 37–47)
HCT VFR BLD CALC: 33.6 % — LOW (ref 37–47)
HGB BLD-MCNC: 11.2 G/DL — LOW (ref 12–16)
HGB BLD-MCNC: 11.2 G/DL — LOW (ref 12–16)
MAGNESIUM SERPL-MCNC: 1.9 MG/DL — SIGNIFICANT CHANGE UP (ref 1.8–2.4)
MCHC RBC-ENTMCNC: 29.1 PG — SIGNIFICANT CHANGE UP (ref 27–31)
MCHC RBC-ENTMCNC: 29.4 PG — SIGNIFICANT CHANGE UP (ref 27–31)
MCHC RBC-ENTMCNC: 33.3 G/DL — SIGNIFICANT CHANGE UP (ref 32–37)
MCHC RBC-ENTMCNC: 33.8 G/DL — SIGNIFICANT CHANGE UP (ref 32–37)
MCV RBC AUTO: 86.9 FL — SIGNIFICANT CHANGE UP (ref 81–99)
MCV RBC AUTO: 87.3 FL — SIGNIFICANT CHANGE UP (ref 81–99)
NRBC # BLD: 0 /100 WBCS — SIGNIFICANT CHANGE UP (ref 0–0)
NRBC # BLD: 0 /100 WBCS — SIGNIFICANT CHANGE UP (ref 0–0)
PHOSPHATE SERPL-MCNC: 2.5 MG/DL — SIGNIFICANT CHANGE UP (ref 2.1–4.9)
PLATELET # BLD AUTO: 225 K/UL — SIGNIFICANT CHANGE UP (ref 130–400)
PLATELET # BLD AUTO: 247 K/UL — SIGNIFICANT CHANGE UP (ref 130–400)
POTASSIUM SERPL-MCNC: 3.7 MMOL/L — SIGNIFICANT CHANGE UP (ref 3.5–5)
POTASSIUM SERPL-SCNC: 3.7 MMOL/L — SIGNIFICANT CHANGE UP (ref 3.5–5)
RBC # BLD: 3.81 M/UL — LOW (ref 4.2–5.4)
RBC # BLD: 3.85 M/UL — LOW (ref 4.2–5.4)
RBC # FLD: 13.6 % — SIGNIFICANT CHANGE UP (ref 11.5–14.5)
RBC # FLD: 13.9 % — SIGNIFICANT CHANGE UP (ref 11.5–14.5)
SODIUM SERPL-SCNC: 140 MMOL/L — SIGNIFICANT CHANGE UP (ref 135–146)
WBC # BLD: 6.97 K/UL — SIGNIFICANT CHANGE UP (ref 4.8–10.8)
WBC # BLD: 9.11 K/UL — SIGNIFICANT CHANGE UP (ref 4.8–10.8)
WBC # FLD AUTO: 6.97 K/UL — SIGNIFICANT CHANGE UP (ref 4.8–10.8)
WBC # FLD AUTO: 9.11 K/UL — SIGNIFICANT CHANGE UP (ref 4.8–10.8)

## 2018-07-24 RX ORDER — AMLODIPINE BESYLATE 2.5 MG/1
5 TABLET ORAL ONCE
Qty: 0 | Refills: 0 | Status: COMPLETED | OUTPATIENT
Start: 2018-07-24 | End: 2018-07-24

## 2018-07-24 RX ORDER — HEPARIN SODIUM 5000 [USP'U]/ML
5000 INJECTION INTRAVENOUS; SUBCUTANEOUS EVERY 12 HOURS
Qty: 0 | Refills: 0 | Status: DISCONTINUED | OUTPATIENT
Start: 2018-07-24 | End: 2018-07-24

## 2018-07-24 RX ORDER — AMLODIPINE BESYLATE 2.5 MG/1
10 TABLET ORAL DAILY
Qty: 0 | Refills: 0 | Status: DISCONTINUED | OUTPATIENT
Start: 2018-07-25 | End: 2018-08-01

## 2018-07-24 RX ORDER — FUROSEMIDE 40 MG
20 TABLET ORAL
Qty: 0 | Refills: 0 | Status: DISCONTINUED | OUTPATIENT
Start: 2018-07-24 | End: 2018-07-25

## 2018-07-24 RX ADMIN — OXYCODONE AND ACETAMINOPHEN 1 TABLET(S): 5; 325 TABLET ORAL at 05:56

## 2018-07-24 RX ADMIN — Medication 20 MILLIGRAM(S): at 18:00

## 2018-07-24 RX ADMIN — Medication 325 MILLIGRAM(S): at 10:07

## 2018-07-24 RX ADMIN — OXYCODONE AND ACETAMINOPHEN 2 TABLET(S): 5; 325 TABLET ORAL at 16:45

## 2018-07-24 RX ADMIN — Medication 100 MILLIGRAM(S): at 05:55

## 2018-07-24 RX ADMIN — Medication 650 MILLIGRAM(S): at 10:38

## 2018-07-24 RX ADMIN — HEPARIN SODIUM 5000 UNIT(S): 5000 INJECTION INTRAVENOUS; SUBCUTANEOUS at 11:00

## 2018-07-24 RX ADMIN — OXYCODONE AND ACETAMINOPHEN 1 TABLET(S): 5; 325 TABLET ORAL at 04:08

## 2018-07-24 RX ADMIN — OXYCODONE AND ACETAMINOPHEN 2 TABLET(S): 5; 325 TABLET ORAL at 23:59

## 2018-07-24 RX ADMIN — AMLODIPINE BESYLATE 5 MILLIGRAM(S): 2.5 TABLET ORAL at 19:47

## 2018-07-24 RX ADMIN — Medication 20 MILLIGRAM(S): at 11:00

## 2018-07-24 RX ADMIN — AMLODIPINE BESYLATE 5 MILLIGRAM(S): 2.5 TABLET ORAL at 02:07

## 2018-07-24 RX ADMIN — AMLODIPINE BESYLATE 5 MILLIGRAM(S): 2.5 TABLET ORAL at 06:21

## 2018-07-24 RX ADMIN — OXYCODONE AND ACETAMINOPHEN 1 TABLET(S): 5; 325 TABLET ORAL at 21:25

## 2018-07-24 RX ADMIN — SIMETHICONE 80 MILLIGRAM(S): 80 TABLET, CHEWABLE ORAL at 16:20

## 2018-07-24 RX ADMIN — ONDANSETRON 4 MILLIGRAM(S): 8 TABLET, FILM COATED ORAL at 16:26

## 2018-07-24 RX ADMIN — PANTOPRAZOLE SODIUM 40 MILLIGRAM(S): 20 TABLET, DELAYED RELEASE ORAL at 11:02

## 2018-07-24 NOTE — PROGRESS NOTE ADULT - SUBJECTIVE AND OBJECTIVE BOX
PGY4 GYN Progress Note    POD #4  Procedure:  ZOILA-BSO for fibroid uterus    Subjective: Patient is doing well. Pain well controlled on PO meds. Patient started ambulating yesterday. She has arteaga catheter draining clear urine with good output. She is tolerating regular diet and passing flatus. No bowel movements.   Denies headache, blurry vision, lightheadedness, chest pain, SOB.     Physical exam:  Vital Signs Last 24 Hrs  T(C): 36.4 (24 Jul 2018 05:47), Max: 36.8 (23 Jul 2018 08:00)  T(F): 97.6 (24 Jul 2018 05:47), Max: 98.2 (23 Jul 2018 08:00)  HR: 97 (24 Jul 2018 05:47) (94 - 101)  BP: 181/88 (24 Jul 2018 05:47) (148/98 - 200/94)  RR: 20 (24 Jul 2018 05:47) (20 - 20)      07-22-18 @ 07:01  -  07-23-18 @ 07:00  --------------------------------------------------------  IN: 1500 mL / OUT: 1825 mL / NET: -325 mL    07-23-18 @ 07:01  -  07-24-18 @ 06:26  --------------------------------------------------------  IN: 450 mL / OUT: 4800 mL / NET: -4350 mL    UO: 2450 from 8542-5296 - 222cc/h    Gen: NAD, alert and oriented  CVS: RRR s1/s2 no murmurs  Lungs: CTABL  Abdomen: BS+, soft, not tender, not distended, incision C/D/I  Pelvic: deferred, no blood in pad  Ext: No calf tenderness or edema, SCDs in place    Diet: Regular    Meds:     acetaminophen   Tablet.   650 milliGRAM(s) Oral (07-23-18 @ 21:53)    amLODIPine   Tablet   5 milliGRAM(s) Oral (07-23-18 @ 16:09)    amLODIPine   Tablet   5 milliGRAM(s) Oral (07-24-18 @ 06:21)    amLODIPine   Tablet   5 milliGRAM(s) Oral (07-24-18 @ 02:07)    docusate sodium   100 milliGRAM(s) Oral (07-24-18 @ 05:55)   100 milliGRAM(s) Oral (07-23-18 @ 17:30)    furosemide   Injectable   20 milliGRAM(s) IV Push (07-23-18 @ 16:09)    oxyCODONE    5 mG/acetaminophen 325 mG   1 Tablet(s) Oral (07-24-18 @ 04:08)   1 Tablet(s) Oral (07-23-18 @ 23:59)    oxyCODONE    5 mG/acetaminophen 325 mG   2 Tablet(s) Oral (07-23-18 @ 13:16)    pantoprazole  Injectable   40 milliGRAM(s) IV Push (07-23-18 @ 13:15)        LABS:                        9.0    5.06  )-----------( 128      ( 23 Jul 2018 07:26 )             26.7                         8.6    5.49  )-----------( 139      ( 22 Jul 2018 11:30 )             25.5                         8.4    5.51  )-----------( 126      ( 22 Jul 2018 07:58 )             24.7     Magnesium, Serum: 2.0 mg/dL (07-23 @ 07:26)    07-23-18 @ 07:26      138  |  103  |  13  ----------------------------<  122<H>  4.1   |  28  |  0.6<L>    07-22-18 @ 11:30      139  |  105  |  16  ----------------------------<  101<H>  4.1   |  26  |  0.6<L>    07-22-18 @ 01:14      132<L>  |  101  |  22<H>  ----------------------------<  116<H>  4.4   |  24  |  0.8    07-21-18 @ 20:00      135  |  102  |  25<H>  ----------------------------<  109<H>  4.6   |  22  |  0.9    07-21-18 @ 16:20      135  |  102  |  26<H>  ----------------------------<  110<H>  4.5   |  21  |  1.0    07-21-18 @ 11:45      134<L>  |  104  |  26<H>  ----------------------------<  142<H>  4.9   |  18  |  1.1        Ca    9.1      23 Jul 2018 07:26  Ca    8.8      22 Jul 2018 11:30  Ca    8.4<L>      22 Jul 2018 01:14  Ca    8.2<L>      21 Jul 2018 20:00  Ca    8.4<L>      21 Jul 2018 16:20  Ca    8.1<L>      21 Jul 2018 11:45  Phos  2.0<L>     07-23  Phos  2.3     07-22  Phos  3.8     07-21  Mg     2.0     07-23  Mg     2.0     07-22  Mg     1.9     07-22  Mg     1.9     07-21

## 2018-07-24 NOTE — PROGRESS NOTE ADULT - SUBJECTIVE AND OBJECTIVE BOX
Nephrology progress note    Patient was seen and examined, events over the last 24 h noted .  remains hypertenisve  reports no change in UOP    Allergies:  Naprosyn (Nausea)    Hospital Medications:   MEDICATIONS  (STANDING):  amLODIPine   Tablet 5 milliGRAM(s) Oral daily  docusate sodium 100 milliGRAM(s) Oral two times a day  furosemide   Injectable 20 milliGRAM(s) IV Push two times a day  heparin  Injectable 5000 Unit(s) SubCutaneous every 12 hours  pantoprazole  Injectable 40 milliGRAM(s) IV Push daily        VITALS:  T(F): 97 (07-24-18 @ 14:20), Max: 98.2 (07-23-18 @ 21:14)  HR: 108 (07-24-18 @ 14:20)  BP: 189/83 (07-24-18 @ 14:20)  RR: 18 (07-24-18 @ 14:20)  SpO2: --  Wt(kg): --    07-22 @ 07:01  -  07-23 @ 07:00  --------------------------------------------------------  IN: 1500 mL / OUT: 1825 mL / NET: -325 mL    07-23 @ 07:01  -  07-24 @ 07:00  --------------------------------------------------------  IN: 450 mL / OUT: 4800 mL / NET: -4350 mL    07-24 @ 07:01  -  07-24 @ 16:26  --------------------------------------------------------  IN: 330 mL / OUT: 3555 mL / NET: -3225 mL          PHYSICAL EXAM:  Constitutional: NAD  HEENT: anicteric sclera, oropharynx clear, MMM  Neck: No JVD  Respiratory: CTAB, no wheezes, rales or rhonchi  Cardiovascular: S1, S2, RRR  Gastrointestinal: BS+, soft, NT/ND  Extremities: No cyanosis or clubbing. +peripheral edema  :  No arteaga.   Skin: No rashes    LABS:  07-24    140  |  100  |  13  ----------------------------<  135<H>  3.7   |  26  |  0.7    Ca    10.1      24 Jul 2018 09:16  Phos  2.5     07-24  Mg     1.9     07-24                            11.2   6.97  )-----------( 225      ( 24 Jul 2018 09:16 )             33.1       Urine Studies:      RADIOLOGY & ADDITIONAL STUDIES:

## 2018-07-24 NOTE — PROGRESS NOTE ADULT - ASSESSMENT
42 year old female with a PMH of Breast Ca in 2017 now POD 1 s/p Total abdominal hysterectomy and bilateral salpingo-oophorectomy for Large 30w size uterus with multiple fibroids (intramural, subserosal, pedunculated) complicated by  JUAN DIEGO with hyperkalemia now resolved        She liekly has underlying essential HTn, worsened by pain/anxiety and IVF she got postop and in setting of JUAN DIEGO    increase amlodipine to 10 mg PO QD  increase  IV lasix 40 mg IV BID and monitor response  Trend Cr  monitor UOP

## 2018-07-24 NOTE — PROGRESS NOTE ADULT - ASSESSMENT
43yo P0 with h/o left breast cancer s/p L unilateral mastectomy, morbid obesity, s/p ZOILA BSO for fibroid uterus, EBL 400cc, POD4, s/p SICU admission due to JUAN DIEGO (now resolved) and hyperkalemia (now resolved), with H/H drop due to acute blood loss anemia, possible stable hematoma, s/p 2u PRBC with stable H/H at the moment, elevated BPs   - f/u AM labs  - will d/c arteaga today for TOV  - monitor VS, and adjust BP meds as needed  - Continue regular diet  - PO pain management with tylenol/percocet (no NSAIDs)  - DVT prophylaxis with SCD, encouraged to ambulate  - possible d/c home today    WIll inform Dr Reed.

## 2018-07-24 NOTE — PROGRESS NOTE ADULT - SUBJECTIVE AND OBJECTIVE BOX
PGY 4 note:    Pt seen and examined at bedside for elevated -200 systolic. Patient denies any complaints other than mild pain and anxiety. Denies any headaches, chest pain, SOB, palpitations. Stat dose of amlodipine 5mg given as per Nephro recommendations. Will continue to monitor BP's q 4 hours.     Will inform Dr. Reed PGY 4 note:    Pt seen and examined at bedside for elevated -200 systolic. Patient denies any complaints other than mild pain and anxiety. Denies any headaches, chest pain, SOB, palpitations. Stat dose of amlodipine 5mg given as per Nephro recommendations.    Physical exam:    Vital Signs Last 24 Hrs  T(F): 98.2 (23 Jul 2018 21:14), Max: 98.2 (23 Jul 2018 08:00)  HR: 94 (24 Jul 2018 01:35) (94 - 101)  BP: 185/81 (24 Jul 2018 01:35) (148/98 - 200/94)  RR: 20 (24 Jul 2018 01:35)  Gen: alert, oriented, NAD  CVS: RRR  Lungs: CTAB  Abdomen: Soft, nontender, non distended. No rebound, rigidity or guarding. Dressing clean, dry, intact  Perineum: no active bleeding. Garcia in place, clear urine  Ext: No calf tenderness, SCD:S in place    Will continue to monitor BP's q 4 hours.       Will inform Dr. Reed

## 2018-07-25 LAB
ANION GAP SERPL CALC-SCNC: 15 MMOL/L — HIGH (ref 7–14)
ANION GAP SERPL CALC-SCNC: 17 MMOL/L — HIGH (ref 7–14)
BASOPHILS # BLD AUTO: 0.08 K/UL — SIGNIFICANT CHANGE UP (ref 0–0.2)
BASOPHILS NFR BLD AUTO: 0.5 % — SIGNIFICANT CHANGE UP (ref 0–1)
BUN SERPL-MCNC: 18 MG/DL — SIGNIFICANT CHANGE UP (ref 10–20)
BUN SERPL-MCNC: 20 MG/DL — SIGNIFICANT CHANGE UP (ref 10–20)
CALCIUM SERPL-MCNC: 10.3 MG/DL — HIGH (ref 8.5–10.1)
CALCIUM SERPL-MCNC: 10.6 MG/DL — HIGH (ref 8.5–10.1)
CHLORIDE SERPL-SCNC: 100 MMOL/L — SIGNIFICANT CHANGE UP (ref 98–110)
CHLORIDE SERPL-SCNC: 98 MMOL/L — SIGNIFICANT CHANGE UP (ref 98–110)
CO2 SERPL-SCNC: 24 MMOL/L — SIGNIFICANT CHANGE UP (ref 17–32)
CO2 SERPL-SCNC: 27 MMOL/L — SIGNIFICANT CHANGE UP (ref 17–32)
CREAT SERPL-MCNC: 1 MG/DL — SIGNIFICANT CHANGE UP (ref 0.7–1.5)
CREAT SERPL-MCNC: 1 MG/DL — SIGNIFICANT CHANGE UP (ref 0.7–1.5)
EOSINOPHIL # BLD AUTO: 0.14 K/UL — SIGNIFICANT CHANGE UP (ref 0–0.7)
EOSINOPHIL NFR BLD AUTO: 0.8 % — SIGNIFICANT CHANGE UP (ref 0–8)
GLUCOSE SERPL-MCNC: 123 MG/DL — HIGH (ref 70–99)
GLUCOSE SERPL-MCNC: 135 MG/DL — HIGH (ref 70–99)
HCT VFR BLD CALC: 38.2 % — SIGNIFICANT CHANGE UP (ref 37–47)
HGB BLD-MCNC: 12.9 G/DL — SIGNIFICANT CHANGE UP (ref 12–16)
IMM GRANULOCYTES NFR BLD AUTO: 2 % — HIGH (ref 0.1–0.3)
LYMPHOCYTES # BLD AUTO: 12.2 % — LOW (ref 20.5–51.1)
LYMPHOCYTES # BLD AUTO: 2.04 K/UL — SIGNIFICANT CHANGE UP (ref 1.2–3.4)
MAGNESIUM SERPL-MCNC: 1.9 MG/DL — SIGNIFICANT CHANGE UP (ref 1.8–2.4)
MAGNESIUM SERPL-MCNC: 2 MG/DL — SIGNIFICANT CHANGE UP (ref 1.8–2.4)
MCHC RBC-ENTMCNC: 29.5 PG — SIGNIFICANT CHANGE UP (ref 27–31)
MCHC RBC-ENTMCNC: 33.8 G/DL — SIGNIFICANT CHANGE UP (ref 32–37)
MCV RBC AUTO: 87.2 FL — SIGNIFICANT CHANGE UP (ref 81–99)
MONOCYTES # BLD AUTO: 0.96 K/UL — HIGH (ref 0.1–0.6)
MONOCYTES NFR BLD AUTO: 5.7 % — SIGNIFICANT CHANGE UP (ref 1.7–9.3)
NEUTROPHILS # BLD AUTO: 13.19 K/UL — HIGH (ref 1.4–6.5)
NEUTROPHILS NFR BLD AUTO: 78.8 % — HIGH (ref 42.2–75.2)
NON-GYNECOLOGICAL CYTOLOGY STUDY: SIGNIFICANT CHANGE UP
NRBC # BLD: 0 /100 WBCS — SIGNIFICANT CHANGE UP (ref 0–0)
PHOSPHATE SERPL-MCNC: 3.6 MG/DL — SIGNIFICANT CHANGE UP (ref 2.1–4.9)
PLATELET # BLD AUTO: 382 K/UL — SIGNIFICANT CHANGE UP (ref 130–400)
POTASSIUM SERPL-MCNC: 4.4 MMOL/L — SIGNIFICANT CHANGE UP (ref 3.5–5)
POTASSIUM SERPL-MCNC: 4.4 MMOL/L — SIGNIFICANT CHANGE UP (ref 3.5–5)
POTASSIUM SERPL-SCNC: 4.4 MMOL/L — SIGNIFICANT CHANGE UP (ref 3.5–5)
POTASSIUM SERPL-SCNC: 4.4 MMOL/L — SIGNIFICANT CHANGE UP (ref 3.5–5)
RBC # BLD: 4.38 M/UL — SIGNIFICANT CHANGE UP (ref 4.2–5.4)
RBC # FLD: 14.2 % — SIGNIFICANT CHANGE UP (ref 11.5–14.5)
SODIUM SERPL-SCNC: 140 MMOL/L — SIGNIFICANT CHANGE UP (ref 135–146)
SODIUM SERPL-SCNC: 141 MMOL/L — SIGNIFICANT CHANGE UP (ref 135–146)
WBC # BLD: 16.79 K/UL — HIGH (ref 4.8–10.8)
WBC # FLD AUTO: 16.79 K/UL — HIGH (ref 4.8–10.8)

## 2018-07-25 RX ORDER — HEPARIN SODIUM 5000 [USP'U]/ML
5000 INJECTION INTRAVENOUS; SUBCUTANEOUS ONCE
Qty: 0 | Refills: 0 | Status: COMPLETED | OUTPATIENT
Start: 2018-07-25 | End: 2018-07-25

## 2018-07-25 RX ORDER — PIPERACILLIN AND TAZOBACTAM 4; .5 G/20ML; G/20ML
3.38 INJECTION, POWDER, LYOPHILIZED, FOR SOLUTION INTRAVENOUS ONCE
Qty: 0 | Refills: 0 | Status: COMPLETED | OUTPATIENT
Start: 2018-07-25 | End: 2018-07-25

## 2018-07-25 RX ORDER — PIPERACILLIN AND TAZOBACTAM 4; .5 G/20ML; G/20ML
3.38 INJECTION, POWDER, LYOPHILIZED, FOR SOLUTION INTRAVENOUS EVERY 8 HOURS
Qty: 0 | Refills: 0 | Status: DISCONTINUED | OUTPATIENT
Start: 2018-07-25 | End: 2018-07-31

## 2018-07-25 RX ORDER — HEPARIN SODIUM 5000 [USP'U]/ML
5000 INJECTION INTRAVENOUS; SUBCUTANEOUS EVERY 12 HOURS
Qty: 0 | Refills: 0 | Status: DISCONTINUED | OUTPATIENT
Start: 2018-07-25 | End: 2018-07-26

## 2018-07-25 RX ORDER — HEPARIN SODIUM 5000 [USP'U]/ML
5000 INJECTION INTRAVENOUS; SUBCUTANEOUS EVERY 12 HOURS
Qty: 0 | Refills: 0 | Status: DISCONTINUED | OUTPATIENT
Start: 2018-07-25 | End: 2018-07-25

## 2018-07-25 RX ORDER — MORPHINE SULFATE 50 MG/1
4 CAPSULE, EXTENDED RELEASE ORAL ONCE
Qty: 0 | Refills: 0 | Status: DISCONTINUED | OUTPATIENT
Start: 2018-07-25 | End: 2018-07-25

## 2018-07-25 RX ORDER — MORPHINE SULFATE 50 MG/1
2 CAPSULE, EXTENDED RELEASE ORAL EVERY 4 HOURS
Qty: 0 | Refills: 0 | Status: DISCONTINUED | OUTPATIENT
Start: 2018-07-25 | End: 2018-07-31

## 2018-07-25 RX ORDER — SODIUM CHLORIDE 9 MG/ML
1000 INJECTION, SOLUTION INTRAVENOUS
Qty: 0 | Refills: 0 | Status: DISCONTINUED | OUTPATIENT
Start: 2018-07-25 | End: 2018-07-27

## 2018-07-25 RX ADMIN — HEPARIN SODIUM 5000 UNIT(S): 5000 INJECTION INTRAVENOUS; SUBCUTANEOUS at 00:19

## 2018-07-25 RX ADMIN — OXYCODONE AND ACETAMINOPHEN 2 TABLET(S): 5; 325 TABLET ORAL at 06:08

## 2018-07-25 RX ADMIN — PIPERACILLIN AND TAZOBACTAM 100 GRAM(S): 4; .5 INJECTION, POWDER, LYOPHILIZED, FOR SOLUTION INTRAVENOUS at 21:26

## 2018-07-25 RX ADMIN — PANTOPRAZOLE SODIUM 40 MILLIGRAM(S): 20 TABLET, DELAYED RELEASE ORAL at 11:48

## 2018-07-25 RX ADMIN — OXYCODONE AND ACETAMINOPHEN 2 TABLET(S): 5; 325 TABLET ORAL at 15:41

## 2018-07-25 RX ADMIN — PIPERACILLIN AND TAZOBACTAM 100 GRAM(S): 4; .5 INJECTION, POWDER, LYOPHILIZED, FOR SOLUTION INTRAVENOUS at 14:46

## 2018-07-25 RX ADMIN — MORPHINE SULFATE 2 MILLIGRAM(S): 50 CAPSULE, EXTENDED RELEASE ORAL at 13:46

## 2018-07-25 RX ADMIN — OXYCODONE AND ACETAMINOPHEN 1 TABLET(S): 5; 325 TABLET ORAL at 09:56

## 2018-07-25 RX ADMIN — OXYCODONE AND ACETAMINOPHEN 2 TABLET(S): 5; 325 TABLET ORAL at 00:30

## 2018-07-25 RX ADMIN — Medication 100 MILLIGRAM(S): at 05:26

## 2018-07-25 RX ADMIN — OXYCODONE AND ACETAMINOPHEN 1 TABLET(S): 5; 325 TABLET ORAL at 03:57

## 2018-07-25 RX ADMIN — OXYCODONE AND ACETAMINOPHEN 2 TABLET(S): 5; 325 TABLET ORAL at 21:48

## 2018-07-25 RX ADMIN — MORPHINE SULFATE 4 MILLIGRAM(S): 50 CAPSULE, EXTENDED RELEASE ORAL at 13:02

## 2018-07-25 RX ADMIN — MORPHINE SULFATE 2 MILLIGRAM(S): 50 CAPSULE, EXTENDED RELEASE ORAL at 14:46

## 2018-07-25 RX ADMIN — AMLODIPINE BESYLATE 10 MILLIGRAM(S): 2.5 TABLET ORAL at 05:26

## 2018-07-25 RX ADMIN — PIPERACILLIN AND TAZOBACTAM 200 GRAM(S): 4; .5 INJECTION, POWDER, LYOPHILIZED, FOR SOLUTION INTRAVENOUS at 11:47

## 2018-07-25 RX ADMIN — MORPHINE SULFATE 2 MILLIGRAM(S): 50 CAPSULE, EXTENDED RELEASE ORAL at 17:51

## 2018-07-25 RX ADMIN — OXYCODONE AND ACETAMINOPHEN 1 TABLET(S): 5; 325 TABLET ORAL at 04:27

## 2018-07-25 RX ADMIN — HEPARIN SODIUM 5000 UNIT(S): 5000 INJECTION INTRAVENOUS; SUBCUTANEOUS at 05:26

## 2018-07-25 RX ADMIN — ONDANSETRON 4 MILLIGRAM(S): 8 TABLET, FILM COATED ORAL at 13:47

## 2018-07-25 RX ADMIN — OXYCODONE AND ACETAMINOPHEN 2 TABLET(S): 5; 325 TABLET ORAL at 06:44

## 2018-07-25 RX ADMIN — MORPHINE SULFATE 4 MILLIGRAM(S): 50 CAPSULE, EXTENDED RELEASE ORAL at 10:32

## 2018-07-25 NOTE — PROGRESS NOTE ADULT - ASSESSMENT
A/P: 42y P0 with h/o left breast cancer s/p L unilateral mastectomy, morbid obesity, s/p ZOILA BSO for fibroid uterus, EBL 400cc, POD5, s/p SICU admission due to JUAN DIEGO (now resolved) and hyperkalemia (now resolved), with H/H drop due to acute blood loss anemia, possible stable hematoma, s/p 2u PRBC with stable H/H at the moment, HTN on amlodipine, with intraabdominal multiloculated collection hematoma vs infection    - IR consulted, recommendations seen. No drainage for now.   - Pain management with IV morphine  - Continue Zosyn IV   - Repeat labs in AM, trend WBC  - Monitor fevers  - continue to monitor UO and trend creatinine  - Will repeat imaging in 2-3days     Dr Reed aware.

## 2018-07-25 NOTE — PHYSICAL THERAPY INITIAL EVALUATION ADULT - GENERAL OBSERVATIONS, REHAB EVAL
14:02- 14:38 36 min  pt rec in bed in NAD, dad at the b/s, pt agreeable to PT, pt stated abdominal pain is getting better

## 2018-07-25 NOTE — PROGRESS NOTE ADULT - ASSESSMENT
42 year old female with a PMH of Breast Ca in 2017 now POD 1 s/p Total abdominal hysterectomy and bilateral salpingo-oophorectomy for Large 30w size uterus with multiple fibroids (intramural, subserosal, pedunculated) complicated by  JUAN DIEGO with hyperkalemia now resolved        She likely has underlying essential HTn, worsened by pain/anxiety and IVF she got postop and in setting of JUAN DIEGO    cont amlodipine  10 mg PO QD  agree w/ stopping lasix  IF planned for CT w/ contrast, givne recent JUAN DIEGO although at low risk for LINA would give 100 cc NS and hr 6 hours before and after contrast load  Trend Cr unclear if 0.7 ->1 represents a rise   monitor UOP

## 2018-07-25 NOTE — CONSULT NOTE ADULT - SUBJECTIVE AND OBJECTIVE BOX
INTERVENTIONAL RADIOLOGY CONSULT:     Procedure Requested:     HPI: 41yo P0 with h/o left breast cancer s/p L unilateral mastectomy, morbid obesity, s/p ZOILA BSO for fibroid uterus, EBL 400cc, POD5, s/p SICU admission due to JUAN DIEGO (now resolved) and hyperkalemia (now resolved), with H/H drop due to acute blood loss anemia, possible stable hematoma, s/p 2u PRBC with stable H/H at the moment, elevated BPs       PAST MEDICAL & SURGICAL HISTORY:  Fibroids  Arthritis  Obese  Breast cancer: left , had surgery, then tamoxifen  History of surgery: fibroid embolization  H/O left mastectomy      MEDICATIONS  (STANDING):  amLODIPine   Tablet 10 milliGRAM(s) Oral daily  docusate sodium 100 milliGRAM(s) Oral two times a day  pantoprazole  Injectable 40 milliGRAM(s) IV Push daily  piperacillin/tazobactam IVPB. 3.375 Gram(s) IV Intermittent every 8 hours    MEDICATIONS  (PRN):  acetaminophen   Tablet 650 milliGRAM(s) Oral every 6 hours PRN For Temp greater than 38 C (100.4 F)  acetaminophen   Tablet. 650 milliGRAM(s) Oral every 6 hours PRN Mild Pain (1 - 3)  diphenhydrAMINE   Capsule 25 milliGRAM(s) Oral every 4 hours PRN Rash and/or Itching  morphine  - Injectable 2 milliGRAM(s) IV Push every 4 hours PRN Severe Pain (7 - 10)  ondansetron Injectable 4 milliGRAM(s) IV Push every 6 hours PRN Nausea  ondansetron Injectable 4 milliGRAM(s) IV Push every 6 hours PRN Nausea and/or Vomiting  oxyCODONE    5 mG/acetaminophen 325 mG 1 Tablet(s) Oral every 4 hours PRN Moderate Pain (4 - 6)  oxyCODONE    5 mG/acetaminophen 325 mG 2 Tablet(s) Oral every 6 hours PRN Severe Pain (7 - 10)  simethicone 80 milliGRAM(s) Chew every 6 hours PRN Gas      Allergies    Naprosyn (Nausea)    Intolerances        Social History:   Smoking: Yes [ ]  No [ ]   ______pk yrs  ETOH  Yes [ ]  No [ ]  Social [ ]  DRUGS:  Yes [ ]  No [ ]  if so what______________    FAMILY HISTORY:  Family history of prostate cancer in father (Father)      Physical Exam:   Vital Signs Last 24 Hrs  T(C): 37.1 (25 Jul 2018 14:45), Max: 38.3 (25 Jul 2018 13:47)  T(F): 98.7 (25 Jul 2018 14:45), Max: 101 (25 Jul 2018 13:47)  HR: 117 (25 Jul 2018 13:47) (98 - 117)  BP: 135/62 (25 Jul 2018 13:47) (134/59 - 194/110)  BP(mean): --  RR: 20 (25 Jul 2018 13:47) (18 - 20)  SpO2: 95% (25 Jul 2018 08:18) (95% - 95%)    General:     Lungs:    Cardiovascular:     Abdomen:    Extremities:    Musculoskeletal:    Neuro/Psych:        Labs:                         12.9   16.79 )-----------( 382      ( 25 Jul 2018 08:10 )             38.2     07-25    141  |  100  |  18  ----------------------------<  135<H>  4.4   |  24  |  1.0    Ca    10.6<H>      25 Jul 2018 08:10  Phos  3.6     07-25  Mg     2.0     07-25          Pertinent labs:                      12.9   16.79 )-----------( 382      ( 25 Jul 2018 08:10 )             38.2       07-25    141  |  100  |  18  ----------------------------<  135<H>  4.4   |  24  |  1.0    Ca    10.6<H>      25 Jul 2018 08:10  Phos  3.6     07-25  Mg     2.0     07-25        Radiology & Additional Studies:   < from: CT Abdomen and Pelvis No Cont (07.25.18 @ 12:56) >  IMPRESSION:   1.  Post ZOILA/BSO with a 11.9 x 10.8 x 18 cm complex, thick walled fluid   collection in the pelvis, suspicious for an infected hematoma or abscess.    2.  Hepatomegaly and hepatic steatosis.    < end of copied text >      Radiology imaging reviewed.       ASSESSMENT/ PLAN:   - consulted for placement of percutaneous drainage catheter for pelvic fluid collection  - CT images reviewed, due to multi-loculated collections - catheter placement not recommended at this time  - patient seen at bedside, in no acute distress but complains of lower abdominal pain - unable to tolerate PO but drinking fluids   - patient denies fever, chills, N/V  - will continue to trend WBC count to verify infectious etiology  - if patient worsens, strongly consider surgical intervention     Risks, benefits, and alternatives to treatment discussed. All questions answered with understanding.    Thank you for the courtesy of this consult, please call [phone] with any further questions. INTERVENTIONAL RADIOLOGY CONSULT:     Procedure Requested:     HPI: 43yo P0 with h/o left breast cancer s/p L unilateral mastectomy, morbid obesity, s/p ZOILA BSO for fibroid uterus, EBL 400cc, POD5, s/p SICU admission due to JUAN DIEGO (now resolved) and hyperkalemia (now resolved), with H/H drop due to acute blood loss anemia, possible stable hematoma, s/p 2u PRBC with stable H/H at the moment, elevated BPs       PAST MEDICAL & SURGICAL HISTORY:  Fibroids  Arthritis  Obese  Breast cancer: left , had surgery, then tamoxifen  History of surgery: fibroid embolization  H/O left mastectomy      MEDICATIONS  (STANDING):  amLODIPine   Tablet 10 milliGRAM(s) Oral daily  docusate sodium 100 milliGRAM(s) Oral two times a day  pantoprazole  Injectable 40 milliGRAM(s) IV Push daily  piperacillin/tazobactam IVPB. 3.375 Gram(s) IV Intermittent every 8 hours    MEDICATIONS  (PRN):  acetaminophen   Tablet 650 milliGRAM(s) Oral every 6 hours PRN For Temp greater than 38 C (100.4 F)  acetaminophen   Tablet. 650 milliGRAM(s) Oral every 6 hours PRN Mild Pain (1 - 3)  diphenhydrAMINE   Capsule 25 milliGRAM(s) Oral every 4 hours PRN Rash and/or Itching  morphine  - Injectable 2 milliGRAM(s) IV Push every 4 hours PRN Severe Pain (7 - 10)  ondansetron Injectable 4 milliGRAM(s) IV Push every 6 hours PRN Nausea  ondansetron Injectable 4 milliGRAM(s) IV Push every 6 hours PRN Nausea and/or Vomiting  oxyCODONE    5 mG/acetaminophen 325 mG 1 Tablet(s) Oral every 4 hours PRN Moderate Pain (4 - 6)  oxyCODONE    5 mG/acetaminophen 325 mG 2 Tablet(s) Oral every 6 hours PRN Severe Pain (7 - 10)  simethicone 80 milliGRAM(s) Chew every 6 hours PRN Gas      Allergies    Naprosyn (Nausea)    Intolerances        Social History:   Smoking: Yes [ ]  No [ ]   ______pk yrs  ETOH  Yes [ ]  No [ ]  Social [ ]  DRUGS:  Yes [ ]  No [ ]  if so what______________    FAMILY HISTORY:  Family history of prostate cancer in father (Father)      Physical Exam:   Vital Signs Last 24 Hrs  T(C): 37.1 (25 Jul 2018 14:45), Max: 38.3 (25 Jul 2018 13:47)  T(F): 98.7 (25 Jul 2018 14:45), Max: 101 (25 Jul 2018 13:47)  HR: 117 (25 Jul 2018 13:47) (98 - 117)  BP: 135/62 (25 Jul 2018 13:47) (134/59 - 194/110)  BP(mean): --  RR: 20 (25 Jul 2018 13:47) (18 - 20)  SpO2: 95% (25 Jul 2018 08:18) (95% - 95%)    General:     Lungs:    Cardiovascular:     Abdomen:    Extremities:    Musculoskeletal:    Neuro/Psych:        Labs:                         12.9   16.79 )-----------( 382      ( 25 Jul 2018 08:10 )             38.2     07-25    141  |  100  |  18  ----------------------------<  135<H>  4.4   |  24  |  1.0    Ca    10.6<H>      25 Jul 2018 08:10  Phos  3.6     07-25  Mg     2.0     07-25          Pertinent labs:                      12.9   16.79 )-----------( 382      ( 25 Jul 2018 08:10 )             38.2       07-25    141  |  100  |  18  ----------------------------<  135<H>  4.4   |  24  |  1.0    Ca    10.6<H>      25 Jul 2018 08:10  Phos  3.6     07-25  Mg     2.0     07-25        Radiology & Additional Studies:   < from: CT Abdomen and Pelvis No Cont (07.25.18 @ 12:56) >  IMPRESSION:   1.  Post ZOILA/BSO with a 11.9 x 10.8 x 18 cm complex, thick walled fluid   collection in the pelvis, suspicious for an infected hematoma or abscess.    2.  Hepatomegaly and hepatic steatosis.    < end of copied text >      Radiology imaging reviewed.       ASSESSMENT/ PLAN:   - consulted for placement of percutaneous drainage catheter for pelvic fluid collection  - CT images reviewed, due to multi-loculated collections - catheter placement not recommended at this time  - patient seen at bedside, in no acute distress but complains of lower abdominal pain - unable to tolerate PO but drinking fluids   - patient denies fever, chills, N/V  - will continue to trend WBC count to verify infectious etiology  - if fluid becomes one localized collection will reconsider intervention  - if patient worsens, strongly consider surgical intervention   - will continue to follow     Risks, benefits, and alternatives to treatment discussed. All questions answered with understanding.    Thank you for the courtesy of this consult, please call [phone] with any further questions. INTERVENTIONAL RADIOLOGY CONSULT:     Procedure Requested:     HPI: 43yo P0 with h/o left breast cancer s/p L unilateral mastectomy, morbid obesity, s/p ZOILA BSO for fibroid uterus, EBL 400cc, POD5, s/p SICU admission due to JUAN DIEGO (now resolved) and hyperkalemia (now resolved), with H/H drop due to acute blood loss anemia, possible stable hematoma, s/p 2u PRBC with stable H/H at the moment, elevated BPs       PAST MEDICAL & SURGICAL HISTORY:  Fibroids  Arthritis  Obese  Breast cancer: left , had surgery, then tamoxifen  History of surgery: fibroid embolization  H/O left mastectomy      MEDICATIONS  (STANDING):  amLODIPine   Tablet 10 milliGRAM(s) Oral daily  docusate sodium 100 milliGRAM(s) Oral two times a day  pantoprazole  Injectable 40 milliGRAM(s) IV Push daily  piperacillin/tazobactam IVPB. 3.375 Gram(s) IV Intermittent every 8 hours    MEDICATIONS  (PRN):  acetaminophen   Tablet 650 milliGRAM(s) Oral every 6 hours PRN For Temp greater than 38 C (100.4 F)  acetaminophen   Tablet. 650 milliGRAM(s) Oral every 6 hours PRN Mild Pain (1 - 3)  diphenhydrAMINE   Capsule 25 milliGRAM(s) Oral every 4 hours PRN Rash and/or Itching  morphine  - Injectable 2 milliGRAM(s) IV Push every 4 hours PRN Severe Pain (7 - 10)  ondansetron Injectable 4 milliGRAM(s) IV Push every 6 hours PRN Nausea  ondansetron Injectable 4 milliGRAM(s) IV Push every 6 hours PRN Nausea and/or Vomiting  oxyCODONE    5 mG/acetaminophen 325 mG 1 Tablet(s) Oral every 4 hours PRN Moderate Pain (4 - 6)  oxyCODONE    5 mG/acetaminophen 325 mG 2 Tablet(s) Oral every 6 hours PRN Severe Pain (7 - 10)  simethicone 80 milliGRAM(s) Chew every 6 hours PRN Gas      Allergies    Naprosyn (Nausea)    Intolerances        Social History:   Smoking: Yes [ ]  No [ ]   ______pk yrs  ETOH  Yes [ ]  No [ ]  Social [ ]  DRUGS:  Yes [ ]  No [ ]  if so what______________    FAMILY HISTORY:  Family history of prostate cancer in father (Father)      Physical Exam:   Vital Signs Last 24 Hrs  T(C): 37.1 (25 Jul 2018 14:45), Max: 38.3 (25 Jul 2018 13:47)  T(F): 98.7 (25 Jul 2018 14:45), Max: 101 (25 Jul 2018 13:47)  HR: 117 (25 Jul 2018 13:47) (98 - 117)  BP: 135/62 (25 Jul 2018 13:47) (134/59 - 194/110)  BP(mean): --  RR: 20 (25 Jul 2018 13:47) (18 - 20)  SpO2: 95% (25 Jul 2018 08:18) (95% - 95%)    General:   not in acute distress    Abdomen:  significant tenderness         Labs:                         12.9   16.79 )-----------( 382      ( 25 Jul 2018 08:10 )             38.2     07-25    141  |  100  |  18  ----------------------------<  135<H>  4.4   |  24  |  1.0    Ca    10.6<H>      25 Jul 2018 08:10  Phos  3.6     07-25  Mg     2.0     07-25          Pertinent labs:                      12.9   16.79 )-----------( 382      ( 25 Jul 2018 08:10 )             38.2       07-25    141  |  100  |  18  ----------------------------<  135<H>  4.4   |  24  |  1.0    Ca    10.6<H>      25 Jul 2018 08:10  Phos  3.6     07-25  Mg     2.0     07-25        Radiology & Additional Studies:   < from: CT Abdomen and Pelvis No Cont (07.25.18 @ 12:56) >  IMPRESSION:   1.  Post ZOILA/BSO with a 11.9 x 10.8 x 18 cm complex, thick walled fluid   collection in the pelvis, suspicious for an infected hematoma or abscess.    2.  Hepatomegaly and hepatic steatosis.    < end of copied text >      Radiology imaging reviewed.       ASSESSMENT/ PLAN:   - consulted for placement of percutaneous drainage catheter for pelvic fluid collection  - CT images reviewed, due to multi-loculated collections - catheter placement not recommended at this time.  Most of the abscess are not accessible percutaneously  - patient seen at bedside, in no acute distress but complains of lower abdominal pain - unable to tolerate PO but drinking fluids   - patient denies fever, chills, N/V  - will continue to trend WBC count to verify infectious etiology  - if fluid becomes one localized collection (very unlikley) will reconsider intervention  - if patient worsens, strongly consider surgical intervention   - IR will continue to follow     Risks, benefits, and alternatives to treatment discussed. All questions answered with understanding.    Thank you for the courtesy of this consult, please call [phone] with any further questions.

## 2018-07-25 NOTE — CHART NOTE - NSCHARTNOTEFT_GEN_A_CORE
Patient reevaluated at bedside with Dr Reed due to increase in abdominal pain, now 8/10. She reports her pain is deep and pressure like, worse than yesterday and not well relieved with percocet PO. Denies nausea/vomiting, chest pain, SOB, lightheadedness, vaginal bleeding, urinary symptoms. Patient is tolerating PO, voiding (adequate UO), had bowel movements yesterday. She is ambulating, but did not ambulate today due to the increase in her pain.     Vital Signs Last 24 Hrs  T(F): 97.9 (25 Jul 2018 10:00), Max: 97.9 (25 Jul 2018 10:00)  HR: 113 (25 Jul 2018 10:00) (98 - 113)  BP: 134/59 (25 Jul 2018 10:00) (134/59 - 194/110)  RR: 20 (25 Jul 2018 10:00) (18 - 20)  SpO2: 95% (25 Jul 2018 08:18) (95% - 95%)    GEN: Patient appears in mild distress due to abdominal pain, alert and oriented  ABD: BS+, incision is c/d/i, no drainage or erythema, abdomen tender diffusely and suspicion for a left abdominal wall hematoma due to induration in the left side abdominal wall, abdomen appears distended  Ext: no calf tenderness or edema   VE: deferred, no blood in chucks    Meds:  On Amlodipine 10mg - last dose (07-25-18 @ 05:26)  S/p furosemide   Injectable - discontinued due to increase in creatinine    20 milliGRAM(s) IV Push (07-24-18 @ 18:00)   20 milliGRAM(s) IV Push (07-24-18 @ 11:00)  S/p heparin  Injectable 5000U BID for DVT prophylaxis, restarted on 7/23, now discontinued due to suspicion for abdominal wall hematoma     Pain meds - oxyCODONE    5 mG/acetaminophen 325 mG   1 Tablet(s) Oral (07-25-18 @ 09:56)   1 Tablet(s) Oral (07-25-18 @ 03:57)   1 Tablet(s) Oral (07-24-18 @ 21:25)   2 Tablet(s) Oral (07-25-18 @ 06:08)   2 Tablet(s) Oral (07-24-18 @ 23:59)   2 Tablet(s) Oral (07-24-18 @ 16:45)   morphine  - Injectable   4 milliGRAM(s) IV Push (07-25-18 @ 10:32)      ondansetron Injectable   4 milliGRAM(s) IV Push (07-24-18 @ 16:26)  pantoprazole  Injectable   40 milliGRAM(s) IV Push (07-24-18 @ 11:02)  simethicone   80 milliGRAM(s) Chew (07-24-18 @ 16:20)        RECENT LABS:                        12.9   16.79 )-----------( 382      ( 25 Jul 2018 08:10 )             38.2                         11.2   9.11  )-----------( 247      ( 24 Jul 2018 21:14 )             33.6                    Magnesium, Serum: 2.0 mg/dL (07-25 @ 08:10)  Magnesium, Serum: 1.9 mg/dL (07-24 @ 21:14)    07-25-18 @ 08:10      141  |  100  |  18  ----------------------------<  135<H>  4.4   |  24  |  1.0    07-24-18 @ 21:14      140  |  98  |  20  ----------------------------<  123<H>  4.4   |  27  |  1.0    07-24-18 @ 09:16      140  |  100  |  13  ----------------------------<  135<H>  3.7   |  26  |  0.7      A/P: 42y P0 with h/o left breast cancer s/p L unilateral mastectomy, morbid obesity, s/p ZOILA BSO for fibroid uterus, EBL 400cc, POD5, s/p SICU admission due to JUAN DIEGO (now resolved) and hyperkalemia (now resolved), with H/H drop due to acute blood loss anemia, possible stable hematoma, s/p 2u PRBC with stable H/H at the moment, HTN on amlodipine, with increase in abdominal pain and white count, suspicion for abdominal wall hematoma vs intraabdominal collection possibly infected     - Pain management with IV morphine  - Start Zosyn IV   - CT abd/pelvis without contrast (due to recent JUAN DIEGO and elevation in Cr) - if inconclusive might need IV contrast  - continue to monitor UO and trend creatinine  - Stop heparin DVT prophylaxis due to suspicion for hematoma. Continue SCDs  - Amlodipine 10mg for HTN, lasix discontinued (discussed with Dr Garcia from nephro)     Dr Reed at bedside.

## 2018-07-25 NOTE — PROGRESS NOTE ADULT - ASSESSMENT
43yo P0 with h/o left breast cancer s/p L unilateral mastectomy, morbid obesity, s/p ZOILA BSO for fibroid uterus, EBL 400cc, POD5, s/p SICU admission due to JUAN DIEGO (now resolved) and hyperkalemia (now resolved), with H/H drop due to acute blood loss anemia, possible stable hematoma, s/p 2u PRBC with stable H/H at the moment, elevated BPs   - f/u AM labs  - continue to monitor UO  - monitor VS, increased amlodipine to 10mg QD, holding lasix for now due to Cr   - Continue regular diet  - PO pain management with tylenol/percocet (no NSAIDs)  - DVT prophylaxis with SCD, heparin SC restarted yesterday      WIll inform Dr Reed.

## 2018-07-25 NOTE — PROGRESS NOTE ADULT - SUBJECTIVE AND OBJECTIVE BOX
Nephrology progress note    Patient was seen and examined, events over the last 24 h noted .  BP better controlled but w/ RLQ pain and rise in WBC, drop in hgb noted  Cr 0.7 ->1    Allergies:  Naprosyn (Nausea)    Hospital Medications:   MEDICATIONS  (STANDING):  amLODIPine   Tablet 10 milliGRAM(s) Oral daily  docusate sodium 100 milliGRAM(s) Oral two times a day  pantoprazole  Injectable 40 milliGRAM(s) IV Push daily  piperacillin/tazobactam IVPB. 3.375 Gram(s) IV Intermittent every 8 hours        VITALS:  T(F): 97.9 (07-25-18 @ 10:00), Max: 97.9 (07-25-18 @ 10:00)  HR: 113 (07-25-18 @ 10:00)  BP: 134/59 (07-25-18 @ 10:00)  RR: 20 (07-25-18 @ 10:00)  SpO2: 95% (07-25-18 @ 08:18)  Wt(kg): --    07-23 @ 07:01  -  07-24 @ 07:00  --------------------------------------------------------  IN: 450 mL / OUT: 4800 mL / NET: -4350 mL    07-24 @ 07:01  -  07-25 @ 07:00  --------------------------------------------------------  IN: 330 mL / OUT: 5755 mL / NET: -5425 mL    07-25 @ 07:01  -  07-25 @ 13:30  --------------------------------------------------------  IN: 2520 mL / OUT: 300 mL / NET: 2220 mL          PHYSICAL EXAM:  Constitutional: NAD  HEENT: anicteric sclera, oropharynx clear, MMM  Neck: No JVD  Respiratory: CTAB, no wheezes, rales or rhonchi  Cardiovascular: S1, S2, RRR  Gastrointestinal: BS+, soft, NT/ND  Extremities: No cyanosis or clubbing. No peripheral edema  :  No arteaga.   Skin: No rashes    LABS:  07-25    141  |  100  |  18  ----------------------------<  135<H>  4.4   |  24  |  1.0    Ca    10.6<H>      25 Jul 2018 08:10  Phos  3.6     07-25  Mg     2.0     07-25                            12.9   16.79 )-----------( 382      ( 25 Jul 2018 08:10 )             38.2       Urine Studies:      RADIOLOGY & ADDITIONAL STUDIES:

## 2018-07-25 NOTE — PROGRESS NOTE ADULT - SUBJECTIVE AND OBJECTIVE BOX
PGY4 GYN Progress Note    POD #5  Procedure:  ZOILA-BSO for fibroid uterus    Subjective: Patient reports abdominal pain 5/10, partial relief with percocet. Patient is ambulating, voiding well. She is tolerating regular diet, passing flatus and had bowel movements.     Physical exam:    Vital Signs Last 24 Hrs  T(C): 36.2 (25 Jul 2018 05:59), Max: 36.7 (24 Jul 2018 10:00)  T(F): 97.1 (25 Jul 2018 05:59), Max: 98 (24 Jul 2018 10:00)  HR: 103 (25 Jul 2018 05:59) (98 - 108)  BP: 156/70 (25 Jul 2018 05:59) (156/70 - 194/110)  RR: 18 (25 Jul 2018 05:59) (18 - 20)    07-24-18 @ 07:01  -  07-25-18 @ 07:00  --------------------------------------------------------  IN: 330 mL / OUT: 5755 mL / NET: -5425 mL        Gen: NAD, alert and oriented  CVS: RRR s1/s2 no murmurs  Lungs: CTABL  Abdomen: BS+, soft, not tender, moderately distended, incision C/D/I  Pelvic: deferred  Ext: No calf tenderness or edema    Diet: Regular    Meds:     acetaminophen   Tablet.   325 milliGRAM(s) Oral (07-24-18 @ 10:07)    amLODIPine   Tablet   5 milliGRAM(s) Oral (07-24-18 @ 19:47)    amLODIPine   Tablet   10 milliGRAM(s) Oral (07-25-18 @ 05:26)    docusate sodium   100 milliGRAM(s) Oral (07-25-18 @ 05:26)    furosemide   Injectable   20 milliGRAM(s) IV Push (07-24-18 @ 18:00)   20 milliGRAM(s) IV Push (07-24-18 @ 11:00)    heparin  Injectable   5000 Unit(s) SubCutaneous (07-24-18 @ 11:00)    heparin  Injectable   5000 Unit(s) SubCutaneous (07-25-18 @ 00:19)    heparin  Injectable   5000 Unit(s) SubCutaneous (07-25-18 @ 05:26)    ondansetron Injectable   4 milliGRAM(s) IV Push (07-24-18 @ 16:26)    oxyCODONE    5 mG/acetaminophen 325 mG   1 Tablet(s) Oral (07-25-18 @ 03:57)   1 Tablet(s) Oral (07-24-18 @ 21:25)    oxyCODONE    5 mG/acetaminophen 325 mG   2 Tablet(s) Oral (07-25-18 @ 06:08)   2 Tablet(s) Oral (07-24-18 @ 23:59)   2 Tablet(s) Oral (07-24-18 @ 16:45)    pantoprazole  Injectable   40 milliGRAM(s) IV Push (07-24-18 @ 11:02)    simethicone   80 milliGRAM(s) Chew (07-24-18 @ 16:20)        LABS:                        11.2   9.11  )-----------( 247      ( 24 Jul 2018 21:14 )             33.6                         11.2   6.97  )-----------( 225      ( 24 Jul 2018 09:16 )             33.1                         9.0    5.06  )-----------( 128      ( 23 Jul 2018 07:26 )             26.7     Magnesium, Serum: 1.9 mg/dL (07-24 @ 21:14)  Magnesium, Serum: 1.9 mg/dL (07-24 @ 09:16)    07-24-18 @ 21:14      140  |  98  |  20  ----------------------------<  123<H>  4.4   |  27  |  1.0    07-24-18 @ 09:16      140  |  100  |  13  ----------------------------<  135<H>  3.7   |  26  |  0.7    07-23-18 @ 07:26      138  |  103  |  13  ----------------------------<  122<H>  4.1   |  28  |  0.6<L>    07-22-18 @ 11:30      139  |  105  |  16  ----------------------------<  101<H>  4.1   |  26  |  0.6<L>        Ca    10.3<H>      24 Jul 2018 21:14  Ca    10.1      24 Jul 2018 09:16  Ca    9.1      23 Jul 2018 07:26  Ca    8.8      22 Jul 2018 11:30  Phos  2.5     07-24  Phos  2.0<L>     07-23  Mg     1.9     07-24  Mg     1.9     07-24  Mg     2.0     07-23  Mg     2.0     07-22 PGY4 GYN Progress Note    POD #5  Procedure:  ZOILA-BSO for fibroid uterus    Subjective: Patient reports abdominal pain 5/10, partial relief with percocet. Patient is ambulating, voiding well. She is tolerating regular diet, passing flatus and had bowel movements.     Physical exam:    Vital Signs Last 24 Hrs  T(C): 36.2 (25 Jul 2018 05:59), Max: 36.7 (24 Jul 2018 10:00)  T(F): 97.1 (25 Jul 2018 05:59), Max: 98 (24 Jul 2018 10:00)  HR: 103 (25 Jul 2018 05:59) (98 - 108)  BP: 156/70 (25 Jul 2018 05:59) (156/70 - 194/110)  RR: 18 (25 Jul 2018 05:59) (18 - 20)    07-24-18 @ 07:01  -  07-25-18 @ 07:00  --------------------------------------------------------  IN: 330 mL / OUT: 5755 mL / NET: -5425 mL    UO 1900-700 =1300 (108cc/h)      Gen: NAD, alert and oriented  CVS: RRR s1/s2 no murmurs  Lungs: CTABL  Abdomen: BS+, soft, not tender, moderately distended, incision C/D/I  Pelvic: deferred  Ext: No calf tenderness or edema    Diet: Regular    Meds:     acetaminophen   Tablet.   325 milliGRAM(s) Oral (07-24-18 @ 10:07)    amLODIPine   Tablet   5 milliGRAM(s) Oral (07-24-18 @ 19:47)    amLODIPine   Tablet   10 milliGRAM(s) Oral (07-25-18 @ 05:26)    docusate sodium   100 milliGRAM(s) Oral (07-25-18 @ 05:26)    furosemide   Injectable   20 milliGRAM(s) IV Push (07-24-18 @ 18:00)   20 milliGRAM(s) IV Push (07-24-18 @ 11:00)    heparin  Injectable   5000 Unit(s) SubCutaneous (07-24-18 @ 11:00)    heparin  Injectable   5000 Unit(s) SubCutaneous (07-25-18 @ 00:19)    heparin  Injectable   5000 Unit(s) SubCutaneous (07-25-18 @ 05:26)    ondansetron Injectable   4 milliGRAM(s) IV Push (07-24-18 @ 16:26)    oxyCODONE    5 mG/acetaminophen 325 mG   1 Tablet(s) Oral (07-25-18 @ 03:57)   1 Tablet(s) Oral (07-24-18 @ 21:25)    oxyCODONE    5 mG/acetaminophen 325 mG   2 Tablet(s) Oral (07-25-18 @ 06:08)   2 Tablet(s) Oral (07-24-18 @ 23:59)   2 Tablet(s) Oral (07-24-18 @ 16:45)    pantoprazole  Injectable   40 milliGRAM(s) IV Push (07-24-18 @ 11:02)    simethicone   80 milliGRAM(s) Chew (07-24-18 @ 16:20)        LABS:                        11.2   9.11  )-----------( 247      ( 24 Jul 2018 21:14 )             33.6                         11.2   6.97  )-----------( 225      ( 24 Jul 2018 09:16 )             33.1                         9.0    5.06  )-----------( 128      ( 23 Jul 2018 07:26 )             26.7     Magnesium, Serum: 1.9 mg/dL (07-24 @ 21:14)  Magnesium, Serum: 1.9 mg/dL (07-24 @ 09:16)    07-24-18 @ 21:14      140  |  98  |  20  ----------------------------<  123<H>  4.4   |  27  |  1.0    07-24-18 @ 09:16      140  |  100  |  13  ----------------------------<  135<H>  3.7   |  26  |  0.7    07-23-18 @ 07:26      138  |  103  |  13  ----------------------------<  122<H>  4.1   |  28  |  0.6<L>    07-22-18 @ 11:30      139  |  105  |  16  ----------------------------<  101<H>  4.1   |  26  |  0.6<L>        Ca    10.3<H>      24 Jul 2018 21:14  Ca    10.1      24 Jul 2018 09:16  Ca    9.1      23 Jul 2018 07:26  Ca    8.8      22 Jul 2018 11:30  Phos  2.5     07-24  Phos  2.0<L>     07-23  Mg     1.9     07-24  Mg     1.9     07-24  Mg     2.0     07-23  Mg     2.0     07-22

## 2018-07-26 LAB
ANION GAP SERPL CALC-SCNC: 13 MMOL/L — SIGNIFICANT CHANGE UP (ref 7–14)
BASOPHILS # BLD AUTO: 0.03 K/UL — SIGNIFICANT CHANGE UP (ref 0–0.2)
BASOPHILS NFR BLD AUTO: 0.2 % — SIGNIFICANT CHANGE UP (ref 0–1)
BUN SERPL-MCNC: 20 MG/DL — SIGNIFICANT CHANGE UP (ref 10–20)
CALCIUM SERPL-MCNC: 9.7 MG/DL — SIGNIFICANT CHANGE UP (ref 8.5–10.1)
CHLORIDE SERPL-SCNC: 95 MMOL/L — LOW (ref 98–110)
CO2 SERPL-SCNC: 27 MMOL/L — SIGNIFICANT CHANGE UP (ref 17–32)
CREAT SERPL-MCNC: 0.8 MG/DL — SIGNIFICANT CHANGE UP (ref 0.7–1.5)
EOSINOPHIL # BLD AUTO: 0.04 K/UL — SIGNIFICANT CHANGE UP (ref 0–0.7)
EOSINOPHIL NFR BLD AUTO: 0.3 % — SIGNIFICANT CHANGE UP (ref 0–8)
GLUCOSE SERPL-MCNC: 125 MG/DL — HIGH (ref 70–99)
HCT VFR BLD CALC: 32.7 % — LOW (ref 37–47)
HGB BLD-MCNC: 11 G/DL — LOW (ref 12–16)
IMM GRANULOCYTES NFR BLD AUTO: 0.9 % — HIGH (ref 0.1–0.3)
LYMPHOCYTES # BLD AUTO: 1.05 K/UL — LOW (ref 1.2–3.4)
LYMPHOCYTES # BLD AUTO: 7 % — LOW (ref 20.5–51.1)
MAGNESIUM SERPL-MCNC: 1.9 MG/DL — SIGNIFICANT CHANGE UP (ref 1.8–2.4)
MCHC RBC-ENTMCNC: 29.3 PG — SIGNIFICANT CHANGE UP (ref 27–31)
MCHC RBC-ENTMCNC: 33.6 G/DL — SIGNIFICANT CHANGE UP (ref 32–37)
MCV RBC AUTO: 87 FL — SIGNIFICANT CHANGE UP (ref 81–99)
MONOCYTES # BLD AUTO: 1.44 K/UL — HIGH (ref 0.1–0.6)
MONOCYTES NFR BLD AUTO: 9.7 % — HIGH (ref 1.7–9.3)
NEUTROPHILS # BLD AUTO: 12.2 K/UL — HIGH (ref 1.4–6.5)
NEUTROPHILS NFR BLD AUTO: 81.9 % — HIGH (ref 42.2–75.2)
NRBC # BLD: 0 /100 WBCS — SIGNIFICANT CHANGE UP (ref 0–0)
PHOSPHATE SERPL-MCNC: 3.2 MG/DL — SIGNIFICANT CHANGE UP (ref 2.1–4.9)
PLATELET # BLD AUTO: 285 K/UL — SIGNIFICANT CHANGE UP (ref 130–400)
POTASSIUM SERPL-MCNC: 4.4 MMOL/L — SIGNIFICANT CHANGE UP (ref 3.5–5)
POTASSIUM SERPL-SCNC: 4.4 MMOL/L — SIGNIFICANT CHANGE UP (ref 3.5–5)
RBC # BLD: 3.76 M/UL — LOW (ref 4.2–5.4)
RBC # FLD: 14.6 % — HIGH (ref 11.5–14.5)
SODIUM SERPL-SCNC: 135 MMOL/L — SIGNIFICANT CHANGE UP (ref 135–146)
SURGICAL PATHOLOGY STUDY: SIGNIFICANT CHANGE UP
WBC # BLD: 14.9 K/UL — HIGH (ref 4.8–10.8)
WBC # FLD AUTO: 14.9 K/UL — HIGH (ref 4.8–10.8)

## 2018-07-26 RX ORDER — HEPARIN SODIUM 5000 [USP'U]/ML
5000 INJECTION INTRAVENOUS; SUBCUTANEOUS EVERY 12 HOURS
Qty: 0 | Refills: 0 | Status: DISCONTINUED | OUTPATIENT
Start: 2018-07-26 | End: 2018-07-29

## 2018-07-26 RX ADMIN — HEPARIN SODIUM 5000 UNIT(S): 5000 INJECTION INTRAVENOUS; SUBCUTANEOUS at 21:20

## 2018-07-26 RX ADMIN — AMLODIPINE BESYLATE 10 MILLIGRAM(S): 2.5 TABLET ORAL at 06:03

## 2018-07-26 RX ADMIN — OXYCODONE AND ACETAMINOPHEN 1 TABLET(S): 5; 325 TABLET ORAL at 18:06

## 2018-07-26 RX ADMIN — HEPARIN SODIUM 5000 UNIT(S): 5000 INJECTION INTRAVENOUS; SUBCUTANEOUS at 00:30

## 2018-07-26 RX ADMIN — PIPERACILLIN AND TAZOBACTAM 100 GRAM(S): 4; .5 INJECTION, POWDER, LYOPHILIZED, FOR SOLUTION INTRAVENOUS at 13:59

## 2018-07-26 RX ADMIN — PIPERACILLIN AND TAZOBACTAM 100 GRAM(S): 4; .5 INJECTION, POWDER, LYOPHILIZED, FOR SOLUTION INTRAVENOUS at 21:21

## 2018-07-26 RX ADMIN — OXYCODONE AND ACETAMINOPHEN 2 TABLET(S): 5; 325 TABLET ORAL at 14:27

## 2018-07-26 RX ADMIN — Medication 650 MILLIGRAM(S): at 12:40

## 2018-07-26 RX ADMIN — HEPARIN SODIUM 5000 UNIT(S): 5000 INJECTION INTRAVENOUS; SUBCUTANEOUS at 11:08

## 2018-07-26 RX ADMIN — MORPHINE SULFATE 2 MILLIGRAM(S): 50 CAPSULE, EXTENDED RELEASE ORAL at 06:25

## 2018-07-26 RX ADMIN — Medication 100 MILLIGRAM(S): at 17:14

## 2018-07-26 RX ADMIN — OXYCODONE AND ACETAMINOPHEN 2 TABLET(S): 5; 325 TABLET ORAL at 08:27

## 2018-07-26 RX ADMIN — OXYCODONE AND ACETAMINOPHEN 2 TABLET(S): 5; 325 TABLET ORAL at 09:11

## 2018-07-26 RX ADMIN — PANTOPRAZOLE SODIUM 40 MILLIGRAM(S): 20 TABLET, DELAYED RELEASE ORAL at 11:07

## 2018-07-26 RX ADMIN — PIPERACILLIN AND TAZOBACTAM 100 GRAM(S): 4; .5 INJECTION, POWDER, LYOPHILIZED, FOR SOLUTION INTRAVENOUS at 05:36

## 2018-07-26 RX ADMIN — SODIUM CHLORIDE 40 MILLILITER(S): 9 INJECTION, SOLUTION INTRAVENOUS at 00:28

## 2018-07-26 RX ADMIN — Medication 650 MILLIGRAM(S): at 20:23

## 2018-07-26 NOTE — PROGRESS NOTE ADULT - ASSESSMENT
A/P: 42y P0 with h/o left breast cancer s/p L unilateral mastectomy, morbid obesity, s/p ZOILA BSO for fibroid uterus, EBL 400cc, POD6, s/p SICU admission due to JUAN DIEGO (now resolved) and hyperkalemia (now resolved), with H/H drop due to acute blood loss anemia, possible stable hematoma, s/p 2u PRBC with stable H/H at the moment, HTN on amlodipine, with intraabdominal multiloculated collection hematoma vs infection, doing better    - Continue current management  - Continue Zosyn IV, if fevers will do blood cultures   - AM labs appreciated, new labs ordered 07/27 AM   - continue to monitor UO  - Will repeat imaging in 2 days  - Heparin 5000U SC BID and SCDs  - Encouraged to ambulate    Will inform Dr Reed

## 2018-07-26 NOTE — PROGRESS NOTE ADULT - SUBJECTIVE AND OBJECTIVE BOX
PGY4 GYN Progress Note - PM ROUND    POD #6  Procedure:  ZOILA-BSO for fibroid uterus   # resolved JUAN DIEGO/hyperkalemia  # intrabdominal collection (hematoma? abscess?)      Subjective: No new events today. Patient is doing well. Pain well controlled with IV morphine/PO percocet. Patient did not ambulate much today. She is voiding well (urine is dark - mixed with blood). She is tolerating regular diet, passing flatus. No bowel movements today. Denies chest pain, SOB, lightheadedness, headaches. Small amount of vaginal bleeding.     Physical exam:    Vital Signs Last 24 Hrs  T(C): 36.9 (26 Jul 2018 13:34), Max: 37.6 (25 Jul 2018 18:05)  T(F): 98.4 (26 Jul 2018 13:34), Max: 99.6 (25 Jul 2018 18:05)  HR: 112 (26 Jul 2018 13:34) (105 - 115)  BP: 123/73 (26 Jul 2018 13:34) (123/73 - 171/87)  RR: 20 (26 Jul 2018 13:34) (18 - 20)      07-25-18 @ 07:01  -  07-26-18 @ 07:00  --------------------------------------------------------  IN: 2520 mL / OUT: 700 mL / NET: 1820 mL    07-26-18 @ 07:01 - 07-26-18 @ 17:49  --------------------------------------------------------  IN: 450 mL / OUT: 1100 mL / NET: -650 mL    UO: 7056-4191: 1100 (122cc/h)      Gen: NAD, alert and oriented  CVS: RRR s1/s2 no murmurs  Lungs: CTABL, slightly decreased breath sounds in the right base  Abdomen: BS+, soft, mildly tender, moderately distended, incision C/D/I  Pelvic: deferred  Ext: No calf tenderness or edema    Diet: regular    Meds:     acetaminophen   Tablet.   650 milliGRAM(s) Oral (07-26-18 @ 12:40)    amLODIPine   Tablet   10 milliGRAM(s) Oral (07-26-18 @ 06:03)    dextrose 5% + sodium chloride 0.45%.   40 mL/Hr IV Continuous (07-25-18 @ 23:49)    docusate sodium   100 milliGRAM(s) Oral (07-26-18 @ 17:14)    heparin  Injectable   5000 Unit(s) SubCutaneous (07-26-18 @ 11:08)    heparin  Injectable   5000 Unit(s) SubCutaneous (07-26-18 @ 00:30)    morphine  - Injectable   2 milliGRAM(s) IV Push (07-26-18 @ 06:25)   2 milliGRAM(s) IV Push (07-25-18 @ 17:51)    oxyCODONE    5 mG/acetaminophen 325 mG   2 Tablet(s) Oral (07-26-18 @ 14:27)   2 Tablet(s) Oral (07-26-18 @ 08:27)   2 Tablet(s) Oral (07-25-18 @ 21:48)    pantoprazole  Injectable   40 milliGRAM(s) IV Push (07-26-18 @ 11:07)    piperacillin/tazobactam IVPB.   100 mL/Hr IV Intermittent (07-26-18 @ 13:59)   100 mL/Hr IV Intermittent (07-26-18 @ 05:36)   100 mL/Hr IV Intermittent (07-25-18 @ 21:26)        LABS:                        11.0   14.90 )-----------( 285      ( 26 Jul 2018 06:38 )             32.7                         12.9   16.79 )-----------( 382      ( 25 Jul 2018 08:10 )             38.2                         11.2   9.11  )-----------( 247      ( 24 Jul 2018 21:14 )             33.6     Magnesium, Serum: 1.9 mg/dL (07-26 @ 06:38)    07-26-18 @ 06:38      135  |  95<L>  |  20  ----------------------------<  125<H>  4.4   |  27  |  0.8    07-25-18 @ 08:10      141  |  100  |  18  ----------------------------<  135<H>  4.4   |  24  |  1.0    07-24-18 @ 21:14      140  |  98  |  20  ----------------------------<  123<H>  4.4   |  27  |  1.0    07-24-18 @ 09:16      140  |  100  |  13  ----------------------------<  135<H>  3.7   |  26  |  0.7        Ca    9.7      26 Jul 2018 06:38  Ca    10.6<H>      25 Jul 2018 08:10  Ca    10.3<H>      24 Jul 2018 21:14  Ca    10.1      24 Jul 2018 09:16  Phos  3.2     07-26  Phos  3.6     07-25  Phos  2.5     07-24  Mg     1.9     07-26  Mg     2.0     07-25  Mg     1.9     07-24  Mg     1.9     07-24

## 2018-07-26 NOTE — PROGRESS NOTE ADULT - ASSESSMENT
A/P: 42y P0 with h/o left breast cancer s/p L unilateral mastectomy, morbid obesity, s/p ZOILA BSO for fibroid uterus, EBL 400cc, POD6, s/p SICU admission due to JUAN DIEGO (now resolved) and hyperkalemia (now resolved), with H/H drop due to acute blood loss anemia, possible stable hematoma, s/p 2u PRBC with stable H/H at the moment, HTN on amlodipine, with intraabdominal multiloculated collection hematoma vs infection    - Pain management   - Continue Zosyn IV, if fevers will do blood cultures   - F/u AM labs, trend creatinine and WBC  - continue to monitor UO, started on IV fluids 40cc/h  - Will repeat imaging in 2 days  - DVT prophylaxis restarted with heparin 5000U SC and continue SCDs  - Encouraged to ambulate    Will inform Dr Reed

## 2018-07-26 NOTE — PROGRESS NOTE ADULT - SUBJECTIVE AND OBJECTIVE BOX
PGY4 GYN Progress Note    POD #6  Procedure:  ZOILA-BSO for fibroid uterus   # resolved JUAN DIEGO/hyperkalemia  # intrabdominal collection (hematoma? abscess?)    Subjective: Patient is doing better. Pain better controlled on percocet and morphine, now 4/10. Patient is ambulating. UO overnight not adequately documented because patient missed the urohat, reports urine is dark possibly admixed with blood. She is tolerating regular diet, passing flatus. No bowel movements yesterday. Denies headache, chest pain, SOB, lightheadedness.     Physical exam:    Vital Signs Last 24 Hrs  T(C): 35.6 (26 Jul 2018 02:29), Max: 38.3 (25 Jul 2018 13:47)  T(F): 96.1 (26 Jul 2018 02:29), Max: 101 (25 Jul 2018 13:47)  HR: 105 (26 Jul 2018 02:29) (105 - 117)  BP: 136/84 (26 Jul 2018 02:29) (134/59 - 171/87)  RR: 18 (26 Jul 2018 02:29) (18 - 20)  SpO2: 95% (25 Jul 2018 08:18) (95% - 95%)    Last fever 101F 7/25 @1347    07-24-18 @ 07:01  -  07-25-18 @ 07:00  --------------------------------------------------------  IN: 330 mL / OUT: 5755 mL / NET: -5425 mL    07-25-18 @ 07:01 - 07-26-18 @ 06:22  --------------------------------------------------------  IN: 2520 mL / OUT: 700 mL / NET: 1820 mL      Gen: NAD, alert and oriented  CVS: RRR s1/s2 no murmurs  Lungs: CTABL  Abdomen: BS+, soft, not tender, not distended, incision C/D/I  Pelvic: deferred  Ext: No calf tenderness or edema    Diet: Regular    Meds:     amLODIPine   Tablet   10 milliGRAM(s) Oral (07-26-18 @ 06:03)    dextrose 5% + sodium chloride 0.45%.   40 mL/Hr IV Continuous (07-25-18 @ 23:49)    heparin  Injectable   5000 Unit(s) SubCutaneous (07-26-18 @ 00:30)    morphine  - Injectable   4 milliGRAM(s) IV Push (07-25-18 @ 10:32)    morphine  - Injectable   2 milliGRAM(s) IV Push (07-25-18 @ 17:51)   2 milliGRAM(s) IV Push (07-25-18 @ 13:46)    ondansetron Injectable   4 milliGRAM(s) IV Push (07-25-18 @ 13:47)    oxyCODONE    5 mG/acetaminophen 325 mG   1 Tablet(s) Oral (07-25-18 @ 09:56)    oxyCODONE    5 mG/acetaminophen 325 mG   2 Tablet(s) Oral (07-25-18 @ 21:48)   2 Tablet(s) Oral (07-25-18 @ 15:41)    pantoprazole  Injectable   40 milliGRAM(s) IV Push (07-25-18 @ 11:48)    piperacillin/tazobactam IVPB.   200 mL/Hr IV Intermittent (07-25-18 @ 11:47)    piperacillin/tazobactam IVPB.   100 mL/Hr IV Intermittent (07-26-18 @ 05:36)   100 mL/Hr IV Intermittent (07-25-18 @ 21:26)   100 mL/Hr IV Intermittent (07-25-18 @ 14:46)        LABS:                        12.9   16.79 )-----------( 382      ( 25 Jul 2018 08:10 )             38.2                         11.2   9.11  )-----------( 247      ( 24 Jul 2018 21:14 )             33.6                         11.2   6.97  )-----------( 225      ( 24 Jul 2018 09:16 )             33.1     Magnesium, Serum: 2.0 mg/dL (07-25 @ 08:10)    07-25-18 @ 08:10      141  |  100  |  18  ----------------------------<  135<H>  4.4   |  24  |  1.0    07-24-18 @ 21:14      140  |  98  |  20  ----------------------------<  123<H>  4.4   |  27  |  1.0    07-24-18 @ 09:16      140  |  100  |  13  ----------------------------<  135<H>  3.7   |  26  |  0.7    07-23-18 @ 07:26      138  |  103  |  13  ----------------------------<  122<H>  4.1   |  28  |  0.6<L>        Ca    10.6<H>      25 Jul 2018 08:10  Ca    10.3<H>      24 Jul 2018 21:14  Ca    10.1      24 Jul 2018 09:16  Ca    9.1      23 Jul 2018 07:26  Phos  3.6     07-25  Phos  2.5     07-24  Phos  2.0<L>     07-23  Mg     2.0     07-25  Mg     1.9     07-24  Mg     1.9     07-24  Mg     2.0     07-23            Allergies    Naprosyn (Nausea)    Intolerances

## 2018-07-27 LAB
ANION GAP SERPL CALC-SCNC: 14 MMOL/L — SIGNIFICANT CHANGE UP (ref 7–14)
BASOPHILS # BLD AUTO: 0.02 K/UL — SIGNIFICANT CHANGE UP (ref 0–0.2)
BASOPHILS NFR BLD AUTO: 0.1 % — SIGNIFICANT CHANGE UP (ref 0–1)
BUN SERPL-MCNC: 19 MG/DL — SIGNIFICANT CHANGE UP (ref 10–20)
CALCIUM SERPL-MCNC: 10.1 MG/DL — SIGNIFICANT CHANGE UP (ref 8.5–10.1)
CHLORIDE SERPL-SCNC: 96 MMOL/L — LOW (ref 98–110)
CO2 SERPL-SCNC: 25 MMOL/L — SIGNIFICANT CHANGE UP (ref 17–32)
CREAT SERPL-MCNC: 0.7 MG/DL — SIGNIFICANT CHANGE UP (ref 0.7–1.5)
EOSINOPHIL # BLD AUTO: 0.02 K/UL — SIGNIFICANT CHANGE UP (ref 0–0.7)
EOSINOPHIL NFR BLD AUTO: 0.1 % — SIGNIFICANT CHANGE UP (ref 0–8)
GLUCOSE SERPL-MCNC: 147 MG/DL — HIGH (ref 70–99)
HCT VFR BLD CALC: 34.4 % — LOW (ref 37–47)
HGB BLD-MCNC: 11.4 G/DL — LOW (ref 12–16)
IMM GRANULOCYTES NFR BLD AUTO: 1.2 % — HIGH (ref 0.1–0.3)
LYMPHOCYTES # BLD AUTO: 0.69 K/UL — LOW (ref 1.2–3.4)
LYMPHOCYTES # BLD AUTO: 4.8 % — LOW (ref 20.5–51.1)
MAGNESIUM SERPL-MCNC: 2.1 MG/DL — SIGNIFICANT CHANGE UP (ref 1.8–2.4)
MCHC RBC-ENTMCNC: 28.9 PG — SIGNIFICANT CHANGE UP (ref 27–31)
MCHC RBC-ENTMCNC: 33.1 G/DL — SIGNIFICANT CHANGE UP (ref 32–37)
MCV RBC AUTO: 87.1 FL — SIGNIFICANT CHANGE UP (ref 81–99)
MONOCYTES # BLD AUTO: 0.68 K/UL — HIGH (ref 0.1–0.6)
MONOCYTES NFR BLD AUTO: 4.7 % — SIGNIFICANT CHANGE UP (ref 1.7–9.3)
NEUTROPHILS # BLD AUTO: 12.81 K/UL — HIGH (ref 1.4–6.5)
NEUTROPHILS NFR BLD AUTO: 89.1 % — HIGH (ref 42.2–75.2)
NRBC # BLD: 0 /100 WBCS — SIGNIFICANT CHANGE UP (ref 0–0)
PHOSPHATE SERPL-MCNC: 3.2 MG/DL — SIGNIFICANT CHANGE UP (ref 2.1–4.9)
PLATELET # BLD AUTO: 337 K/UL — SIGNIFICANT CHANGE UP (ref 130–400)
POTASSIUM SERPL-MCNC: 4.2 MMOL/L — SIGNIFICANT CHANGE UP (ref 3.5–5)
POTASSIUM SERPL-SCNC: 4.2 MMOL/L — SIGNIFICANT CHANGE UP (ref 3.5–5)
RBC # BLD: 3.95 M/UL — LOW (ref 4.2–5.4)
RBC # FLD: 14.6 % — HIGH (ref 11.5–14.5)
SODIUM SERPL-SCNC: 135 MMOL/L — SIGNIFICANT CHANGE UP (ref 135–146)
WBC # BLD: 14.39 K/UL — HIGH (ref 4.8–10.8)
WBC # FLD AUTO: 14.39 K/UL — HIGH (ref 4.8–10.8)

## 2018-07-27 RX ORDER — SIMETHICONE 80 MG/1
80 TABLET, CHEWABLE ORAL EVERY 6 HOURS
Qty: 0 | Refills: 0 | Status: COMPLETED | OUTPATIENT
Start: 2018-07-27 | End: 2018-07-27

## 2018-07-27 RX ORDER — SODIUM CHLORIDE 9 MG/ML
1000 INJECTION INTRAMUSCULAR; INTRAVENOUS; SUBCUTANEOUS
Qty: 0 | Refills: 0 | Status: DISCONTINUED | OUTPATIENT
Start: 2018-07-27 | End: 2018-07-27

## 2018-07-27 RX ORDER — SODIUM CHLORIDE 9 MG/ML
1000 INJECTION INTRAMUSCULAR; INTRAVENOUS; SUBCUTANEOUS
Qty: 0 | Refills: 0 | Status: DISCONTINUED | OUTPATIENT
Start: 2018-07-27 | End: 2018-07-28

## 2018-07-27 RX ORDER — ONDANSETRON 8 MG/1
4 TABLET, FILM COATED ORAL EVERY 6 HOURS
Qty: 0 | Refills: 0 | Status: DISCONTINUED | OUTPATIENT
Start: 2018-07-27 | End: 2018-08-01

## 2018-07-27 RX ORDER — IOHEXOL 300 MG/ML
30 INJECTION, SOLUTION INTRAVENOUS ONCE
Qty: 0 | Refills: 0 | Status: COMPLETED | OUTPATIENT
Start: 2018-07-27 | End: 2018-07-27

## 2018-07-27 RX ADMIN — AMLODIPINE BESYLATE 10 MILLIGRAM(S): 2.5 TABLET ORAL at 05:24

## 2018-07-27 RX ADMIN — OXYCODONE AND ACETAMINOPHEN 1 TABLET(S): 5; 325 TABLET ORAL at 17:10

## 2018-07-27 RX ADMIN — HEPARIN SODIUM 5000 UNIT(S): 5000 INJECTION INTRAVENOUS; SUBCUTANEOUS at 09:15

## 2018-07-27 RX ADMIN — MORPHINE SULFATE 2 MILLIGRAM(S): 50 CAPSULE, EXTENDED RELEASE ORAL at 16:19

## 2018-07-27 RX ADMIN — PANTOPRAZOLE SODIUM 40 MILLIGRAM(S): 20 TABLET, DELAYED RELEASE ORAL at 14:46

## 2018-07-27 RX ADMIN — PIPERACILLIN AND TAZOBACTAM 100 GRAM(S): 4; .5 INJECTION, POWDER, LYOPHILIZED, FOR SOLUTION INTRAVENOUS at 21:15

## 2018-07-27 RX ADMIN — HEPARIN SODIUM 5000 UNIT(S): 5000 INJECTION INTRAVENOUS; SUBCUTANEOUS at 21:14

## 2018-07-27 RX ADMIN — PIPERACILLIN AND TAZOBACTAM 100 GRAM(S): 4; .5 INJECTION, POWDER, LYOPHILIZED, FOR SOLUTION INTRAVENOUS at 14:38

## 2018-07-27 RX ADMIN — ONDANSETRON 4 MILLIGRAM(S): 8 TABLET, FILM COATED ORAL at 20:08

## 2018-07-27 RX ADMIN — ONDANSETRON 4 MILLIGRAM(S): 8 TABLET, FILM COATED ORAL at 10:11

## 2018-07-27 RX ADMIN — SIMETHICONE 80 MILLIGRAM(S): 80 TABLET, CHEWABLE ORAL at 13:01

## 2018-07-27 RX ADMIN — SODIUM CHLORIDE 40 MILLILITER(S): 9 INJECTION INTRAMUSCULAR; INTRAVENOUS; SUBCUTANEOUS at 14:38

## 2018-07-27 RX ADMIN — Medication 100 MILLIGRAM(S): at 17:02

## 2018-07-27 RX ADMIN — OXYCODONE AND ACETAMINOPHEN 1 TABLET(S): 5; 325 TABLET ORAL at 10:12

## 2018-07-27 RX ADMIN — IOHEXOL 30 MILLILITER(S): 300 INJECTION, SOLUTION INTRAVENOUS at 20:10

## 2018-07-27 RX ADMIN — SIMETHICONE 80 MILLIGRAM(S): 80 TABLET, CHEWABLE ORAL at 23:40

## 2018-07-27 RX ADMIN — ONDANSETRON 4 MILLIGRAM(S): 8 TABLET, FILM COATED ORAL at 14:38

## 2018-07-27 NOTE — PROGRESS NOTE ADULT - SUBJECTIVE AND OBJECTIVE BOX
PGY4 GYN Progress Note    POD #7  Procedure:  ZOILA-BSO for fibroid uterus   # resolved JUAN DIEGO/hyperkalemia  # intrabdominal collection (hematoma? abscess?)    Subjective: Patient feels nauseous, did not feel like eating today. One episode of vomiting after breakfast. Pain well controlled. Passing flatus and had two bowel movements today (loose). Minimal bleeding per vagina. Voiding, good urine output. No chest pain, SOB, lightheadedness.    Physical exam:    Vital Signs Last 24 Hrs  T(C): 36.3 (27 Jul 2018 13:38), Max: 37.3 (26 Jul 2018 20:30)  T(F): 97.4 (27 Jul 2018 13:38), Max: 99.1 (26 Jul 2018 20:30)  HR: 112 (27 Jul 2018 13:38) (110 - 114)  BP: 167/95 (27 Jul 2018 13:38) (134/86 - 167/95)  RR: 19 (27 Jul 2018 13:38) (19 - 20)      07-26-18 @ 07:01  -  07-27-18 @ 07:00  --------------------------------------------------------  IN: 810 mL / OUT: 2076 mL / NET: -1266 mL    07-27-18 @ 07:01  - 07-27-18 @ 17:10  --------------------------------------------------------  IN: 0 mL / OUT: 400 mL / NET: -400 mL    UO 4557-4126: 700cc - 116cc/h      Gen: NAD, alert and oriented  CVS: RRR s1/s2 no murmurs  Lungs: decreased breath sounds in the right, CTA in the left  Abdomen: BS+, soft, not tender, moderately distended, incision C/D/I  Pelvic: deferred, no blood  Ext: No calf tenderness or edema    Meds:     acetaminophen   Tablet.   650 milliGRAM(s) Oral (07-26-18 @ 20:23)    amLODIPine   Tablet   10 milliGRAM(s) Oral (07-27-18 @ 05:24)    docusate sodium   100 milliGRAM(s) Oral (07-27-18 @ 17:02)   100 milliGRAM(s) Oral (07-26-18 @ 17:14)    heparin  Injectable   5000 Unit(s) SubCutaneous (07-27-18 @ 09:15)   5000 Unit(s) SubCutaneous (07-26-18 @ 21:20)    morphine  - Injectable   2 milliGRAM(s) IV Push (07-27-18 @ 16:19)    ondansetron   Disintegrating Tablet   4 milliGRAM(s) Oral (07-27-18 @ 10:11)    ondansetron Injectable   4 milliGRAM(s) IV Push (07-27-18 @ 14:38)    oxyCODONE    5 mG/acetaminophen 325 mG   1 Tablet(s) Oral (07-27-18 @ 10:12)   1 Tablet(s) Oral (07-26-18 @ 18:06)    pantoprazole  Injectable   40 milliGRAM(s) IV Push (07-27-18 @ 14:46)    piperacillin/tazobactam IVPB. - patient lost IV access this AM and lost her morning dose    100 mL/Hr IV Intermittent (07-27-18 @ 14:38)   100 mL/Hr IV Intermittent (07-26-18 @ 21:21)    simethicone   80 milliGRAM(s) Chew (07-27-18 @ 13:01)    sodium chloride 0.9%.   40 mL/Hr IV Continuous (07-27-18 @ 14:05)        LABS:                        11.4   14.39 )-----------( 337      ( 27 Jul 2018 14:06 )             34.4                         11.0   14.90 )-----------( 285      ( 26 Jul 2018 06:38 )             32.7     Magnesium, Serum: 2.1 mg/dL (07-27 @ 14:06)    07-27-18 @ 14:06      135  |  96<L>  |  19  ----------------------------<  147<H>  4.2   |  25  |  0.7    07-26-18 @ 06:38      135  |  95<L>  |  20  ----------------------------<  125<H>  4.4   |  27  |  0.8    07-25-18 @ 08:10      141  |  100  |  18  ----------------------------<  135<H>  4.4   |  24  |  1.0    07-24-18 @ 21:14      140  |  98  |  20  ----------------------------<  123<H>  4.4   |  27  |  1.0        Ca    10.1      27 Jul 2018 14:06  Ca    9.7      26 Jul 2018 06:38  Ca    10.6<H>      25 Jul 2018 08:10  Ca    10.3<H>      24 Jul 2018 21:14  Phos  3.2     07-27  Phos  3.2     07-26  Phos  3.6     07-25  Mg     2.1     07-27  Mg     1.9     07-26  Mg     2.0     07-25  Mg     1.9     07-24

## 2018-07-27 NOTE — DIETITIAN INITIAL EVALUATION ADULT. - OTHER INFO
Pt s/p ZOILA-BSO for fibroid uterus s/p SICU admission due to JUAN DIEGO (now resolved) and hyperkalemia (now resolved), with H/H drop due to acute blood loss anemia, possible stable hematoma, s/p 2u PRBC with stable H/H at the moment. Reason for Assessment: LOS

## 2018-07-27 NOTE — DIETITIAN INITIAL EVALUATION ADULT. - ORAL INTAKE PTA
good/pt reports good appetite/po intake and denies use of oral nutrition supplements PTA. Pt follows a generalized diet and has NKFA.

## 2018-07-27 NOTE — DIETITIAN INITIAL EVALUATION ADULT. - FEEDING SKILL
independent/Pt reports consuming half of her meal trays despite 100% po intake documented in EMR. Pt not interested in any oral nutrition supplements at this time, wants to take it slow, consume as much as she can tolerate.

## 2018-07-27 NOTE — DIETITIAN INITIAL EVALUATION ADULT. - PHYSICAL APPEARANCE
alert and oriented. BMI: 38.2 (65"/230#), IBW: 125#, UBW: ~230#, denies any recent unintentional wt loss. Julee 20, surgical incision/obese

## 2018-07-27 NOTE — PROGRESS NOTE ADULT - ASSESSMENT
A/P: 42y P0 with h/o left breast cancer s/p L unilateral mastectomy, morbid obesity, s/p ZOILA BSO for fibroid uterus, EBL 400cc, POD7, s/p SICU admission due to JUAN DIEGO (now resolved) and hyperkalemia (now resolved), with H/H drop due to acute blood loss anemia, possible stable hematoma, s/p 2u PRBC with stable H/H at the moment, HTN on amlodipine well controlled, with intraabdominal multiloculated collection hematoma vs infection, afebrile, improving clinically    - Pain management PRN  - Continue Zosyn IV, if fevers will do blood cultures   - F/u AM labs, trend creatinine and WBC  - continue to monitor UO  - Will repeat imaging on 7/30  - Continue DVT prophylaxis heparin 5000U SC BID and SCDs  - Encouraged to ambulate    Will inform Dr Reed

## 2018-07-27 NOTE — DIETITIAN INITIAL EVALUATION ADULT. - ENERGY NEEDS
Estimated Calorie Needs: 3971-9053 kcal/day (25-30 kcal/kg IBW)--obese BMI noted  Estimated Protein Needs: 57-68 gm/day (1-1.2 gm/kg IBW)  Estimated Fluid Needs: 1 ml/kcal

## 2018-07-27 NOTE — PROGRESS NOTE ADULT - ASSESSMENT
A/P: 42y P0 with h/o left breast cancer s/p L unilateral mastectomy, morbid obesity, s/p ZOILA BSO for fibroid uterus, EBL 400cc, POD7, s/p SICU admission due to JUAN DIEGO (now resolved) and hyperkalemia (now resolved), with H/H drop due to acute blood loss anemia, possible stable hematoma, s/p 2u PRBC with stable H/H at the moment, HTN on amlodipine well controlled, with intraabdominal multiloculated collection hematoma vs infection, afebrile, now with nausea and decreased breath sounds in the right     - Will do CT chest/abd/pelvis with IV and oral contrast now to f/u intrabadominal collection and evaluate lungs (effusions? r/o PE) - pre and post hydration with 100cc/h NS, NPO   - Pain management PRN  - Continue Zosyn IV, if fevers will do blood cultures   - Daily labs to trend WBC and monitor creatinine  - continue to monitor UO  - Continue DVT prophylaxis heparin 5000U SC BID and SCDs  - Encouraged to ambulate    Dr Reed aware

## 2018-07-27 NOTE — PROGRESS NOTE ADULT - SUBJECTIVE AND OBJECTIVE BOX
PGY4 GYN Progress Note    POD #7  Procedure:  ZOILA-BSO for fibroid uterus   # resolved JUAN DIEGO/hyperkalemia  # intrabdominal collection (hematoma? abscess?)    Subjective: Patient is doing well. Pain better controlled. Patient is ambulating, voiding well. She is tolerating regular diet, passing flatus. No bowel movements for 2 days. Reports small amount of bleeding per vagina. No chest pain, SOB, lightheadedness.    Physical exam:    Vital Signs Last 24 Hrs  T(C): 37 (27 Jul 2018 05:23), Max: 37.3 (26 Jul 2018 07:41)  T(F): 98.6 (27 Jul 2018 05:23), Max: 99.2 (26 Jul 2018 07:41)  HR: 114 (27 Jul 2018 05:23) (110 - 115)  BP: 134/86 (27 Jul 2018 05:23) (123/73 - 138/85)  RR: 20 (27 Jul 2018 05:23) (18 - 20)        07-25-18 @ 07:01  -  07-26-18 @ 07:00  --------------------------------------------------------  IN: 2520 mL / OUT: 700 mL / NET: 1820 mL    07-26-18 @ 07:01  -  07-27-18 @ 06:34  --------------------------------------------------------  IN: 810 mL / OUT: 2075 mL / NET: -1265 mL    UO: 975 from [id number]: 81cc/h    Gen: NAD, alert and oriented  CVS: RRR s1/s2 no murmurs  Lungs: CTABL  Abdomen: BS+, soft, not tender, midly distended, incision C/D/I  Pelvic: deferred  Ext: No calf tenderness or edema    Diet: regular    Meds:     acetaminophen   Tablet.   650 milliGRAM(s) Oral (07-26-18 @ 20:23)   650 milliGRAM(s) Oral (07-26-18 @ 12:40)    amLODIPine   Tablet   10 milliGRAM(s) Oral (07-27-18 @ 05:24)    docusate sodium   100 milliGRAM(s) Oral (07-26-18 @ 17:14)    heparin  Injectable   5000 Unit(s) SubCutaneous (07-26-18 @ 21:20)   5000 Unit(s) SubCutaneous (07-26-18 @ 11:08)    oxyCODONE    5 mG/acetaminophen 325 mG   1 Tablet(s) Oral (07-26-18 @ 18:06)    oxyCODONE    5 mG/acetaminophen 325 mG   2 Tablet(s) Oral (07-26-18 @ 14:27)   2 Tablet(s) Oral (07-26-18 @ 08:27)    pantoprazole  Injectable   40 milliGRAM(s) IV Push (07-26-18 @ 11:07)    piperacillin/tazobactam IVPB.   100 mL/Hr IV Intermittent (07-26-18 @ 21:21)   100 mL/Hr IV Intermittent (07-26-18 @ 13:59)        LABS:                        11.0   14.90 )-----------( 285      ( 26 Jul 2018 06:38 )             32.7                         12.9   16.79 )-----------( 382      ( 25 Jul 2018 08:10 )             38.2     Magnesium, Serum: 1.9 mg/dL (07-26 @ 06:38)    07-26-18 @ 06:38      135  |  95<L>  |  20  ----------------------------<  125<H>  4.4   |  27  |  0.8    07-25-18 @ 08:10      141  |  100  |  18  ----------------------------<  135<H>  4.4   |  24  |  1.0

## 2018-07-28 LAB
ANION GAP SERPL CALC-SCNC: 15 MMOL/L — HIGH (ref 7–14)
BASOPHILS # BLD AUTO: 0.02 K/UL — SIGNIFICANT CHANGE UP (ref 0–0.2)
BASOPHILS NFR BLD AUTO: 0.2 % — SIGNIFICANT CHANGE UP (ref 0–1)
BUN SERPL-MCNC: 17 MG/DL — SIGNIFICANT CHANGE UP (ref 10–20)
C DIFF BY PCR RESULT: NEGATIVE — SIGNIFICANT CHANGE UP
C DIFF TOX GENS STL QL NAA+PROBE: SIGNIFICANT CHANGE UP
CALCIUM SERPL-MCNC: 9.2 MG/DL — SIGNIFICANT CHANGE UP (ref 8.5–10.1)
CHLORIDE SERPL-SCNC: 99 MMOL/L — SIGNIFICANT CHANGE UP (ref 98–110)
CO2 SERPL-SCNC: 24 MMOL/L — SIGNIFICANT CHANGE UP (ref 17–32)
CREAT SERPL-MCNC: 0.8 MG/DL — SIGNIFICANT CHANGE UP (ref 0.7–1.5)
EOSINOPHIL # BLD AUTO: 0.05 K/UL — SIGNIFICANT CHANGE UP (ref 0–0.7)
EOSINOPHIL NFR BLD AUTO: 0.5 % — SIGNIFICANT CHANGE UP (ref 0–8)
GLUCOSE SERPL-MCNC: 94 MG/DL — SIGNIFICANT CHANGE UP (ref 70–99)
HCT VFR BLD CALC: 29.5 % — LOW (ref 37–47)
HGB BLD-MCNC: 9.7 G/DL — LOW (ref 12–16)
IMM GRANULOCYTES NFR BLD AUTO: 0.9 % — HIGH (ref 0.1–0.3)
LYMPHOCYTES # BLD AUTO: 0.84 K/UL — LOW (ref 1.2–3.4)
LYMPHOCYTES # BLD AUTO: 9.1 % — LOW (ref 20.5–51.1)
MAGNESIUM SERPL-MCNC: 2 MG/DL — SIGNIFICANT CHANGE UP (ref 1.8–2.4)
MCHC RBC-ENTMCNC: 28.8 PG — SIGNIFICANT CHANGE UP (ref 27–31)
MCHC RBC-ENTMCNC: 32.9 G/DL — SIGNIFICANT CHANGE UP (ref 32–37)
MCV RBC AUTO: 87.5 FL — SIGNIFICANT CHANGE UP (ref 81–99)
MONOCYTES # BLD AUTO: 0.61 K/UL — HIGH (ref 0.1–0.6)
MONOCYTES NFR BLD AUTO: 6.6 % — SIGNIFICANT CHANGE UP (ref 1.7–9.3)
NEUTROPHILS # BLD AUTO: 7.6 K/UL — HIGH (ref 1.4–6.5)
NEUTROPHILS NFR BLD AUTO: 82.7 % — HIGH (ref 42.2–75.2)
NRBC # BLD: 0 /100 WBCS — SIGNIFICANT CHANGE UP (ref 0–0)
PHOSPHATE SERPL-MCNC: 3.1 MG/DL — SIGNIFICANT CHANGE UP (ref 2.1–4.9)
PLATELET # BLD AUTO: 140 K/UL — SIGNIFICANT CHANGE UP (ref 130–400)
POTASSIUM SERPL-MCNC: 4.3 MMOL/L — SIGNIFICANT CHANGE UP (ref 3.5–5)
POTASSIUM SERPL-SCNC: 4.3 MMOL/L — SIGNIFICANT CHANGE UP (ref 3.5–5)
RBC # BLD: 3.37 M/UL — LOW (ref 4.2–5.4)
RBC # FLD: 14.6 % — HIGH (ref 11.5–14.5)
SODIUM SERPL-SCNC: 138 MMOL/L — SIGNIFICANT CHANGE UP (ref 135–146)
WBC # BLD: 9.2 K/UL — SIGNIFICANT CHANGE UP (ref 4.8–10.8)
WBC # FLD AUTO: 9.2 K/UL — SIGNIFICANT CHANGE UP (ref 4.8–10.8)

## 2018-07-28 RX ORDER — SODIUM CHLORIDE 9 MG/ML
1000 INJECTION INTRAMUSCULAR; INTRAVENOUS; SUBCUTANEOUS
Qty: 0 | Refills: 0 | Status: DISCONTINUED | OUTPATIENT
Start: 2018-07-28 | End: 2018-07-29

## 2018-07-28 RX ADMIN — MORPHINE SULFATE 2 MILLIGRAM(S): 50 CAPSULE, EXTENDED RELEASE ORAL at 00:32

## 2018-07-28 RX ADMIN — PANTOPRAZOLE SODIUM 40 MILLIGRAM(S): 20 TABLET, DELAYED RELEASE ORAL at 11:25

## 2018-07-28 RX ADMIN — SODIUM CHLORIDE 40 MILLILITER(S): 9 INJECTION INTRAMUSCULAR; INTRAVENOUS; SUBCUTANEOUS at 18:35

## 2018-07-28 RX ADMIN — AMLODIPINE BESYLATE 10 MILLIGRAM(S): 2.5 TABLET ORAL at 05:19

## 2018-07-28 RX ADMIN — PIPERACILLIN AND TAZOBACTAM 100 GRAM(S): 4; .5 INJECTION, POWDER, LYOPHILIZED, FOR SOLUTION INTRAVENOUS at 13:18

## 2018-07-28 RX ADMIN — SODIUM CHLORIDE 100 MILLILITER(S): 9 INJECTION INTRAMUSCULAR; INTRAVENOUS; SUBCUTANEOUS at 04:21

## 2018-07-28 RX ADMIN — HEPARIN SODIUM 5000 UNIT(S): 5000 INJECTION INTRAVENOUS; SUBCUTANEOUS at 10:22

## 2018-07-28 RX ADMIN — PIPERACILLIN AND TAZOBACTAM 100 GRAM(S): 4; .5 INJECTION, POWDER, LYOPHILIZED, FOR SOLUTION INTRAVENOUS at 05:50

## 2018-07-28 RX ADMIN — Medication 100 MILLIGRAM(S): at 05:18

## 2018-07-28 RX ADMIN — HEPARIN SODIUM 5000 UNIT(S): 5000 INJECTION INTRAVENOUS; SUBCUTANEOUS at 21:35

## 2018-07-28 RX ADMIN — PIPERACILLIN AND TAZOBACTAM 100 GRAM(S): 4; .5 INJECTION, POWDER, LYOPHILIZED, FOR SOLUTION INTRAVENOUS at 21:35

## 2018-07-28 NOTE — PROGRESS NOTE ADULT - SUBJECTIVE AND OBJECTIVE BOX
PGY 3 Note:      POD #8  Procedure: ZOILA-BSO for fibroid uterus   # resolved JUAN DIEGO/hyperkalemia  # intrabdominal collection (hematoma? abscess?)      Subjective: Patient is doing well. Pain well controlled. Patient is ambulating, voiding (although only one void is documented yesterday morning 400cc). She is tolerating regular diet, passing flatus. She reports having 4 episodes of diarrhea today, green and loose. Reports small amount of bleeding per vagina. No chest pain, SOB, lightheadedness, fever, chills, nausea, vomiting.      Vital Signs Last 24 Hrs  T(C): 36.7 (28 Jul 2018 20:04), Max: 36.7 (28 Jul 2018 20:04)  T(F): 98 (28 Jul 2018 20:04), Max: 98 (28 Jul 2018 20:04)  HR: 103 (28 Jul 2018 20:04) (103 - 106)  BP: 142/83 (28 Jul 2018 20:04) (127/59 - 142/83)  RR: 18 (28 Jul 2018 20:04) (18 - 20)      I&O's Summary    27 Jul 2018 07:01  -  28 Jul 2018 07:00  --------------------------------------------------------  IN: 1030 mL / OUT: 1500 mL / NET: -470 mL    28 Jul 2018 07:01  -  29 Jul 2018 00:52  --------------------------------------------------------  IN: 450 mL / OUT: 402 mL / NET: 48 mL      Gen: NAD, alert and oriented  CVS: RRR s1/s2 no murmurs  Lungs: CTABL  Abdomen: BS+, soft, not tender, not distended, incision C/D/I  Pelvic: deferred  Ext: No calf tenderness or edema      Labs:                        9.7    9.20  )-----------( 140      ( 28 Jul 2018 09:39 )             29.5   07-28    138  |  99  |  17  ----------------------------<  94  4.3   |  24  |  0.8    Ca    9.2      28 Jul 2018 09:39  Phos  3.1     07-28  Mg     2.0     07-28      Medications:  acetaminophen   Tablet 650 milliGRAM(s) Oral every 6 hours PRN  acetaminophen   Tablet. 650 milliGRAM(s) Oral every 6 hours PRN  amLODIPine   Tablet 10 milliGRAM(s) Oral daily  diphenhydrAMINE   Capsule 25 milliGRAM(s) Oral every 4 hours PRN  docusate sodium 100 milliGRAM(s) Oral two times a day  heparin  Injectable 5000 Unit(s) SubCutaneous every 12 hours  morphine  - Injectable 2 milliGRAM(s) IV Push every 4 hours PRN  ondansetron   Disintegrating Tablet 4 milliGRAM(s) Oral every 6 hours PRN  ondansetron Injectable 4 milliGRAM(s) IV Push every 6 hours PRN  ondansetron Injectable 4 milliGRAM(s) IV Push every 6 hours PRN  oxyCODONE    5 mG/acetaminophen 325 mG 1 Tablet(s) Oral every 4 hours PRN  oxyCODONE    5 mG/acetaminophen 325 mG 2 Tablet(s) Oral every 6 hours PRN  pantoprazole  Injectable 40 milliGRAM(s) IV Push daily  piperacillin/tazobactam IVPB. 3.375 Gram(s) IV Intermittent every 8 hours  simethicone 80 milliGRAM(s) Chew every 6 hours PRN  sodium chloride 0.9%. 1000 milliLiter(s) IV Continuous <Continuous>    IMAGING:     < from: CT Abdomen and Pelvis w/ Oral Cont and w/ IV Cont (07.28.18 @ 00:30) >  EXAM:  CT ABDOMEN AND PELVIS OC IC        EXAM:  CT CHEST IC            PROCEDURE DATE:  07/28/2018      INTERPRETATION:  Clinical History / Reason for exam: Post total abdominal   hysterectomy and bilateral salpingonephrectomy, postoperativeday 7.   Abdominopelvic collection.    TECHNIQUE: Contiguous axial CT images were obtained from the thoracic   inlet to the pubic symphysis following administration of 100c Optiray 320   intravenous contrast.  Oral contrast was not administered.  Reformatted   images in the coronal and sagittal planes were acquired.    COMPARISON CT: CT abdomen and pelvis 7/25/2018.       FINDINGS:    CHEST:    LUNGS: No focal consolidation. Minimal right lower lobe linear   atelectasis.    PLEURA/PERICARDIUM: Nopleural effusion or pericardial effusion.    MEDIASTINUM/THORACIC NODES: Unremarkable.      ABDOMEN/PELVIS:    HEPATOBILIARY: Unremarkable liver. Mild perihepatic/right paracolic   gutter ascites.    SPLEEN: Unremarkable.    PANCREAS: Unremarkable.    ADRENAL GLANDS: Unremarkable.    KIDNEYS: Unremarkable.    ABDOMINOPELVIC NODES: Unremarkable.    PELVIC ORGANS: Post total abdominal hysterectomy and   salpingo-oophorectomy. No pneumoperitoneum.    PERITONEUM/MESENTERY/BOWEL: Previously noted pelvic collection has   increased in size, currently measuring 15.8 x 12.8 x 15.8 cm with thick   wall and increased density dependently, again suspicious for infected   hematoma or abscess. This collection appears separate from bowel loops   opacified with oral contrast. Dilated maximal small bowel loops measuring   up to 5 cm. Several mid to distal small bowel nonobstructive loops   demonstrate circumferential wall thickening in the region of the pelvic   collection. The oral contrast extends into the nondilated loops of small   bowel    BONES/SOFT TISSUES: Post left mastectomy.    OTHER:      IMPRESSION:     Aforementioned abdominopelvic collection appears slightly more prominent   since the prior exam.    Evidence of partial small bowel obstruction with apparent enteritis in   the region of the aforementioned collection.    LAVELL OLIVA M.D., RESIDENT RADIOLOGIST  This document has been electronically signed.  MARÍA GILLESPIE M.D., ATTENDING RADIOLOGIST  This document has beenelectronically signed. Jul 28 2018  5:19AM    < end of copied text >

## 2018-07-28 NOTE — PROGRESS NOTE ADULT - ASSESSMENT
A/P: 42y P0 with h/o left breast cancer s/p L unilateral mastectomy, morbid obesity, s/p ZOILA BSO for fibroid uterus, EBL 400cc, POD 8, s/p SICU admission due to JUAN DIEGO (now resolved) and hyperkalemia (now resolved), with H/H drop due to acute blood loss anemia, possible stable hematoma, s/p 2u PRBC with stable H/H at the moment, HTN on amlodipine, with intraabdominal multiloculated collection 15.8cm infected hematoma vs abscess, with partial small bowel obstruction    Spoke with IR resident who said that if the patient is stable, they will consider drainage on Monday 7/30/18. If patient decompensates will consider drainage sooner.   - As patient is afebrile since 7/25 and WBC is 9.2, will continue Zosyn if fevers will do blood cultures    - Pain management   - Continue to monitor UO, will decreased IV fluids from 100cc/h to 40cc/h as patient will now eat  - DVT prophylaxis restarted with heparin 5000U SC and continue SCDs  - Encouraged to ambulate  - Advanced diet from NPO (for CT scan) to Regular as tolerated  - Will re-evaluate in the PM       Dr Reed aware

## 2018-07-28 NOTE — PROGRESS NOTE ADULT - SUBJECTIVE AND OBJECTIVE BOX
PGY 3 Note:    POD #8  Procedure: ZOILA-BSO for fibroid uterus   # resolved JUAN DIEGO/hyperkalemia  # intrabdominal collection (hematoma? abscess?)    Subjective: Patient is doing well. Pain better controlled. Patient is ambulating, voiding. She is tolerating regular diet, passing flatus. No bowel movements for 3 days. Reports small amount of bleeding per vagina. No chest pain, SOB, lightheadedness.      Vital Signs Last 24 Hrs  T(C): 36.2 (28 Jul 2018 05:14), Max: 36.8 (27 Jul 2018 17:51)  T(F): 97.1 (28 Jul 2018 05:14), Max: 98.3 (27 Jul 2018 17:51)  HR: 105 (28 Jul 2018 05:14) (105 - 112)  BP: 127/69 (28 Jul 2018 05:14) (127/69 - 177/86)  RR: 20 (28 Jul 2018 05:14) (19 - 20)    I&O's Summary    27 Jul 2018 07:01  -  28 Jul 2018 07:00  --------------------------------------------------------  IN: 1030 mL / OUT: 1500 mL / NET: -470 mL    28 Jul 2018 07:01  -  28 Jul 2018 11:17  --------------------------------------------------------  IN: 0 mL / OUT: 400 mL / NET: -400 mL      Gen: NAD, alert and oriented  CVS: RRR s1/s2 no murmurs  Lungs: CTABL  Abdomen: BS+, soft, not tender, not distended, incision C/D/I  Pelvic: deferred  Ext: No calf tenderness or edema    Labs:                        9.7    9.20  )-----------( 140      ( 28 Jul 2018 09:39 )             29.5   07-28    138  |  99  |  17  ----------------------------<  94  4.3   |  24  |  0.8    Ca    9.2      28 Jul 2018 09:39  Phos  3.1     07-28  Mg     2.0     07-28      Medications:  acetaminophen   Tablet 650 milliGRAM(s) Oral every 6 hours PRN  acetaminophen   Tablet. 650 milliGRAM(s) Oral every 6 hours PRN  amLODIPine   Tablet 10 milliGRAM(s) Oral daily  diphenhydrAMINE   Capsule 25 milliGRAM(s) Oral every 4 hours PRN  docusate sodium 100 milliGRAM(s) Oral two times a day  heparin  Injectable 5000 Unit(s) SubCutaneous every 12 hours  morphine  - Injectable 2 milliGRAM(s) IV Push every 4 hours PRN  ondansetron   Disintegrating Tablet 4 milliGRAM(s) Oral every 6 hours PRN  ondansetron Injectable 4 milliGRAM(s) IV Push every 6 hours PRN  ondansetron Injectable 4 milliGRAM(s) IV Push every 6 hours PRN  oxyCODONE    5 mG/acetaminophen 325 mG 1 Tablet(s) Oral every 4 hours PRN  oxyCODONE    5 mG/acetaminophen 325 mG 2 Tablet(s) Oral every 6 hours PRN  pantoprazole  Injectable 40 milliGRAM(s) IV Push daily  piperacillin/tazobactam IVPB. 3.375 Gram(s) IV Intermittent every 8 hours  simethicone 80 milliGRAM(s) Chew every 6 hours PRN  sodium chloride 0.9%. 1000 milliLiter(s) IV Continuous <Continuous>    IMAGING:     < from: CT Abdomen and Pelvis w/ Oral Cont and w/ IV Cont (07.28.18 @ 00:30) >  EXAM:  CT ABDOMEN AND PELVIS OC IC        EXAM:  CT CHEST IC            PROCEDURE DATE:  07/28/2018            INTERPRETATION:  Clinical History / Reason for exam: Post total abdominal   hysterectomy and bilateral salpingonephrectomy, postoperativeday 7.   Abdominopelvic collection.    TECHNIQUE: Contiguous axial CT images were obtained from the thoracic   inlet to the pubic symphysis following administration of 100c Optiray 320   intravenous contrast.  Oral contrast was not administered.  Reformatted   images in the coronal and sagittal planes were acquired.    COMPARISON CT: CT abdomen and pelvis 7/25/2018.       FINDINGS:    CHEST:    LUNGS: No focal consolidation. Minimal right lower lobe linear   atelectasis.    PLEURA/PERICARDIUM: Nopleural effusion or pericardial effusion.    MEDIASTINUM/THORACIC NODES: Unremarkable.      ABDOMEN/PELVIS:    HEPATOBILIARY: Unremarkable liver. Mild perihepatic/right paracolic   gutter ascites.    SPLEEN: Unremarkable.    PANCREAS: Unremarkable.    ADRENAL GLANDS: Unremarkable.    KIDNEYS: Unremarkable.    ABDOMINOPELVIC NODES: Unremarkable.    PELVIC ORGANS: Post total abdominal hysterectomy and   salpingo-oophorectomy. No pneumoperitoneum.    PERITONEUM/MESENTERY/BOWEL: Previously noted pelvic collection has   increased in size, currently measuring 15.8 x 12.8 x 15.8 cm with thick   wall and increased density dependently, again suspicious for infected   hematoma or abscess. This collection appears separate from bowel loops   opacified with oral contrast. Dilated maximal small bowel loops measuring   up to 5 cm. Several mid to distal small bowel nonobstructive loops   demonstrate circumferential wall thickening in the region of the pelvic   collection. The oral contrast extends into the nondilated loops of small   bowel    BONES/SOFT TISSUES: Post left mastectomy.    OTHER:      IMPRESSION:     Aforementioned abdominopelvic collection appears slightly more prominent   since the prior exam.    Evidence of partial small bowel obstruction with apparent enteritis in   the region of the aforementioned collection.              LAVELL OLIVA M.D., RESIDENT RADIOLOGIST  This document has been electronically signed.  MARAÍ GILLESPIE M.D., ATTENDING RADIOLOGIST  This document has beenelectronically signed. Jul 28 2018  5:19AM    < end of copied text >

## 2018-07-28 NOTE — PROGRESS NOTE ADULT - ASSESSMENT
A/P: 42y P0 with h/o left breast cancer s/p L unilateral mastectomy, morbid obesity, s/p ZOILA BSO for fibroid uterus, EBL 400cc, POD 8, s/p SICU admission due to JUAN DIEGO (now resolved) and hyperkalemia (now resolved), with H/H drop due to acute blood loss anemia, possible stable hematoma, s/p 2u PRBC with stable H/H at the moment, HTN on amlodipine, with intraabdominal multiloculated collection 15.8cm infected hematoma vs abscess, with partial small bowel obstruction, now with diarrhea    - IR to consider drainage on 7/30  - As patient is afebrile since 7/25 and WBC is 9.2, will continue Zosyn if fevers will do blood cultures  - F/u stool studies (cultures, C. Diff PCR, Ova and Parasite)  - On isolation/contact  - Pain management   - Continue to monitor UO, IV fluids at 40cc/h   - DVT prophylaxis restarted with heparin 5000U SC and continue SCDs  - Encouraged to ambulate  - Tolerating regular diet  - AM labs ordered       Dr Brianna goodman

## 2018-07-29 LAB
ANION GAP SERPL CALC-SCNC: 12 MMOL/L — SIGNIFICANT CHANGE UP (ref 7–14)
BUN SERPL-MCNC: 14 MG/DL — SIGNIFICANT CHANGE UP (ref 10–20)
CALCIUM SERPL-MCNC: 9.4 MG/DL — SIGNIFICANT CHANGE UP (ref 8.5–10.1)
CHLORIDE SERPL-SCNC: 102 MMOL/L — SIGNIFICANT CHANGE UP (ref 98–110)
CO2 SERPL-SCNC: 24 MMOL/L — SIGNIFICANT CHANGE UP (ref 17–32)
CREAT SERPL-MCNC: 0.7 MG/DL — SIGNIFICANT CHANGE UP (ref 0.7–1.5)
GLUCOSE SERPL-MCNC: 107 MG/DL — HIGH (ref 70–99)
HCT VFR BLD CALC: 29.1 % — LOW (ref 37–47)
HGB BLD-MCNC: 9.7 G/DL — LOW (ref 12–16)
MAGNESIUM SERPL-MCNC: 1.9 MG/DL — SIGNIFICANT CHANGE UP (ref 1.8–2.4)
MCHC RBC-ENTMCNC: 29 PG — SIGNIFICANT CHANGE UP (ref 27–31)
MCHC RBC-ENTMCNC: 33.3 G/DL — SIGNIFICANT CHANGE UP (ref 32–37)
MCV RBC AUTO: 87.1 FL — SIGNIFICANT CHANGE UP (ref 81–99)
NRBC # BLD: 0 /100 WBCS — SIGNIFICANT CHANGE UP (ref 0–0)
PHOSPHATE SERPL-MCNC: 2.9 MG/DL — SIGNIFICANT CHANGE UP (ref 2.1–4.9)
PLATELET # BLD AUTO: 259 K/UL — SIGNIFICANT CHANGE UP (ref 130–400)
POTASSIUM SERPL-MCNC: 4 MMOL/L — SIGNIFICANT CHANGE UP (ref 3.5–5)
POTASSIUM SERPL-SCNC: 4 MMOL/L — SIGNIFICANT CHANGE UP (ref 3.5–5)
RBC # BLD: 3.34 M/UL — LOW (ref 4.2–5.4)
RBC # FLD: 14.2 % — SIGNIFICANT CHANGE UP (ref 11.5–14.5)
SODIUM SERPL-SCNC: 138 MMOL/L — SIGNIFICANT CHANGE UP (ref 135–146)
WBC # BLD: 7.86 K/UL — SIGNIFICANT CHANGE UP (ref 4.8–10.8)
WBC # FLD AUTO: 7.86 K/UL — SIGNIFICANT CHANGE UP (ref 4.8–10.8)

## 2018-07-29 RX ORDER — POLYETHYLENE GLYCOL 3350 17 G/17G
17 POWDER, FOR SOLUTION ORAL
Qty: 0 | Refills: 0 | Status: DISCONTINUED | OUTPATIENT
Start: 2018-07-29 | End: 2018-07-29

## 2018-07-29 RX ORDER — SODIUM CHLORIDE 9 MG/ML
1000 INJECTION INTRAMUSCULAR; INTRAVENOUS; SUBCUTANEOUS
Qty: 0 | Refills: 0 | Status: DISCONTINUED | OUTPATIENT
Start: 2018-07-30 | End: 2018-07-30

## 2018-07-29 RX ADMIN — PIPERACILLIN AND TAZOBACTAM 100 GRAM(S): 4; .5 INJECTION, POWDER, LYOPHILIZED, FOR SOLUTION INTRAVENOUS at 14:06

## 2018-07-29 RX ADMIN — PIPERACILLIN AND TAZOBACTAM 100 GRAM(S): 4; .5 INJECTION, POWDER, LYOPHILIZED, FOR SOLUTION INTRAVENOUS at 05:30

## 2018-07-29 RX ADMIN — HEPARIN SODIUM 5000 UNIT(S): 5000 INJECTION INTRAVENOUS; SUBCUTANEOUS at 10:45

## 2018-07-29 RX ADMIN — PANTOPRAZOLE SODIUM 40 MILLIGRAM(S): 20 TABLET, DELAYED RELEASE ORAL at 10:46

## 2018-07-29 RX ADMIN — HEPARIN SODIUM 5000 UNIT(S): 5000 INJECTION INTRAVENOUS; SUBCUTANEOUS at 22:18

## 2018-07-29 RX ADMIN — AMLODIPINE BESYLATE 10 MILLIGRAM(S): 2.5 TABLET ORAL at 05:22

## 2018-07-29 RX ADMIN — PIPERACILLIN AND TAZOBACTAM 100 GRAM(S): 4; .5 INJECTION, POWDER, LYOPHILIZED, FOR SOLUTION INTRAVENOUS at 22:18

## 2018-07-29 NOTE — PROGRESS NOTE ADULT - ASSESSMENT
42y P0 with h/o left breast cancer s/p L unilateral mastectomy, morbid obesity, s/p ZOILA BSO for fibroid uterus, EBL 400cc, POD 9, s/p SICU admission due to JUAN DIEGO (now resolved) and hyperkalemia (now resolved), with H/H drop due to acute blood loss anemia, s/p 2u PRBC with stable H/H at the moment, HTN on amlodipine, with intraabdominal multiloculated collection 15.8cm infected hematoma vs abscess, with partial small bowel obstruction, now with diarrhea, not c. diff, stable at this time    - for IR drainage on 7/30  - NPO at midnight, restart IV fluids and stop heparin after evening dose  - continue Zosyn if fevers will do blood cultures  - Pain management PRN  - DVT prophylaxis restarted with heparin 5000U SC and continue SCDs (see above)  - AM labs ordered, will send for 7/30 labs CBC, CMP, Coags  - repeat BP and temp in 1 hour       will inform Dr Reed

## 2018-07-29 NOTE — PROGRESS NOTE ADULT - SUBJECTIVE AND OBJECTIVE BOX
[facility] PGY4 Progress Note    POD #9    Subjective: pt seen and evaluated at bedside, reports pain has now resolved, now 1/10 in intensity; reports some vaginal bleeding occasionally. Still has loose stools, brown/green in color, watery in texture; approximately 4-5 episodes last night. One episode this morning, no change. Passing flatus. Denies CP, SOB, nausea, vomiting, confusion, dizziness, or febrile episodes.     Physical exam:    Vital Signs Last 24 Hrs  T(F): 98.7 (29 Jul 2018 04:43), Max: 98.7 (29 Jul 2018 04:43)  HR: 108 (29 Jul 2018 04:43) (103 - 108)  BP: 166/73 (29 Jul 2018 04:43) (127/59 - 166/73)  RR: 20 (29 Jul 2018 04:43) (18 - 20)    Gen: NAD, AAOx3  CVS: S1S2, RRR  Lungs: Ctab, no rhonchi or rales  Abdomen: Soft, nontender, moderate distension, +BS, no tympany on exam  Incision: Clean, dry, and intact  Pelvic: deferred, no vaginal bleeding noted on kaushik  Ext: No calf tenderness    Diet: regular    meds:   amLODIPine   Tablet   10 milliGRAM(s) Oral (07-29-18 @ 05:22)    heparin  Injectable   5000 Unit(s) SubCutaneous (07-28-18 @ 21:35)   5000 Unit(s) SubCutaneous (07-28-18 @ 10:22)    pantoprazole  Injectable   40 milliGRAM(s) IV Push (07-28-18 @ 11:25)    piperacillin/tazobactam IVPB.   100 mL/Hr IV Intermittent (07-29-18 @ 05:30)   100 mL/Hr IV Intermittent (07-28-18 @ 21:35)   100 mL/Hr IV Intermittent (07-28-18 @ 13:18)    sodium chloride 0.9%.   40 mL/Hr IV Continuous (07-28-18 @ 11:14)        LABS:                        9.7    7.86  )-----------( 259      ( 29 Jul 2018 06:53 )             29.1                         9.7    9.20  )-----------( 140      ( 28 Jul 2018 09:39 )             29.5                         11.4   14.39 )-----------( 337      ( 27 Jul 2018 14:06 )             34.4     Magnesium, Serum: 2.0 mg/dL (07-28 @ 09:39)    07-28-18 @ 09:39      138  |  99  |  17  ----------------------------<  94  4.3   |  24  |  0.8    07-27-18 @ 14:06      135  |  96<L>  |  19  ----------------------------<  147<H>  4.2   |  25  |  0.7        Ca    9.2      28 Jul 2018 09:39  Ca    10.1      27 Jul 2018 14:06  Phos  3.1     07-28  Phos  3.2     07-27  Mg     2.0     07-28  Mg     2.1     07-27      Clostridium difficile Toxin by PCR (07.28.18 @ 15:25)    Clostridium difficile Toxin by PCR: RESULT INTERPRETATION:    Not detected - No Clostridium difficile toxins detected by amplified DNA  PCR.  C Diff by PCR Result: Negative

## 2018-07-29 NOTE — PROGRESS NOTE ADULT - SUBJECTIVE AND OBJECTIVE BOX
[facility] PGY4 Progress Note (PM)    POD #9    Subjective: re-evaluated patient at bedside, reports only 3-4 episodes of green/brown diarrhea today (c.diff negative). Ambulating without difficulty. Feels some nausea, denies vomiting, confusion, CP, SOB. Abdominal pain well controlled at this time. Tolerating PO. Patient requesting anti-anxiety medication before procedure tomorrow. BP elevated at this time, otherwise asymptomatic. Afebrile at this time.     Physical exam:    Vital Signs Last 24 Hrs  T(F): 102 (29 Jul 2018 18:06), Max: 102 (29 Jul 2018 18:06)  HR: 102 (29 Jul 2018 18:06) (99 - 108)  BP: 178/83 (29 Jul 2018 18:06) (142/83 - 178/83)  RR: 20 (29 Jul 2018 18:06) (18 - 20)    Gen: NAD, AAOx3  CVS: S1S2, RRR  Lungs: Ctab, no rhonchi or rales  Abdomen: Soft, nontender, no distension   Incision: Clean, dry, and intact  Pelvic: no vaginal bleeding  Ext: No calf tenderness    Diet: regular    meds:     amLODIPine   Tablet   10 milliGRAM(s) Oral (07-29-18 @ 05:22)    heparin  Injectable   5000 Unit(s) SubCutaneous (07-29-18 @ 10:45)   5000 Unit(s) SubCutaneous (07-28-18 @ 21:35)    pantoprazole  Injectable   40 milliGRAM(s) IV Push (07-29-18 @ 10:46)    piperacillin/tazobactam IVPB.   100 mL/Hr IV Intermittent (07-29-18 @ 14:06)   100 mL/Hr IV Intermittent (07-29-18 @ 05:30)   100 mL/Hr IV Intermittent (07-28-18 @ 21:35)        LABS:                        9.7    7.86  )-----------( 259      ( 29 Jul 2018 06:53 )             29.1                         9.7    9.20  )-----------( 140      ( 28 Jul 2018 09:39 )             29.5     Magnesium, Serum: 1.9 mg/dL (07-29 @ 06:53)    07-29-18 @ 06:53      138  |  102  |  14  ----------------------------<  107<H>  4.0   |  24  |  0.7    07-28-18 @ 09:39      138  |  99  |  17  ----------------------------<  94  4.3   |  24  |  0.8    07-27-18 @ 14:06      135  |  96<L>  |  19  ----------------------------<  147<H>  4.2   |  25  |  0.7        Ca    9.4      29 Jul 2018 06:53  Ca    9.2      28 Jul 2018 09:39  Ca    10.1      27 Jul 2018 14:06  Phos  2.9     07-29  Phos  3.1     07-28  Phos  3.2     07-27  Mg     1.9     07-29  Mg     2.0     07-28  Mg     2.1     07-27

## 2018-07-29 NOTE — PROGRESS NOTE ADULT - ASSESSMENT
A/P: 42y P0 with h/o left breast cancer s/p L unilateral mastectomy, morbid obesity, s/p ZOILA BSO for fibroid uterus, EBL 400cc, POD 9, s/p SICU admission due to JUAN DIEGO (now resolved) and hyperkalemia (now resolved), with H/H drop due to acute blood loss anemia, s/p 2u PRBC with stable H/H at the moment, HTN on amlodipine, with intraabdominal multiloculated collection 15.8cm infected hematoma vs abscess, with partial small bowel obstruction, now with diarrhea, not c. diff, stable at this time    - for IR drainage on 7/30  - NPO at midnight, restart IV fluids and stop heparin after evening dose  - As patient is afebrile since 7/25 and WBC is 9.2, will continue Zosyn if fevers will do blood cultures  - Pain management PRN  - Continue to monitor UO  - DVT prophylaxis restarted with heparin 5000U SC and continue SCDs (see above)  - Encouraged to ambulate  - Tolerating regular diet  - AM labs ordered, will send for 7/30 labs CBC, CMP, Coags       Dr Reed aware

## 2018-07-30 LAB
ANION GAP SERPL CALC-SCNC: 14 MMOL/L — SIGNIFICANT CHANGE UP (ref 7–14)
APTT BLD: 34.2 SEC — SIGNIFICANT CHANGE UP (ref 27–39.2)
BLD GP AB SCN SERPL QL: SIGNIFICANT CHANGE UP
BUN SERPL-MCNC: 11 MG/DL — SIGNIFICANT CHANGE UP (ref 10–20)
CALCIUM SERPL-MCNC: 9.6 MG/DL — SIGNIFICANT CHANGE UP (ref 8.5–10.1)
CHLORIDE SERPL-SCNC: 98 MMOL/L — SIGNIFICANT CHANGE UP (ref 98–110)
CO2 SERPL-SCNC: 24 MMOL/L — SIGNIFICANT CHANGE UP (ref 17–32)
CREAT SERPL-MCNC: 0.6 MG/DL — LOW (ref 0.7–1.5)
GLUCOSE SERPL-MCNC: 85 MG/DL — SIGNIFICANT CHANGE UP (ref 70–99)
HCT VFR BLD CALC: 30.3 % — LOW (ref 37–47)
HGB BLD-MCNC: 10.1 G/DL — LOW (ref 12–16)
INR BLD: 1.28 RATIO — SIGNIFICANT CHANGE UP (ref 0.65–1.3)
MAGNESIUM SERPL-MCNC: 1.8 MG/DL — SIGNIFICANT CHANGE UP (ref 1.8–2.4)
MCHC RBC-ENTMCNC: 28.6 PG — SIGNIFICANT CHANGE UP (ref 27–31)
MCHC RBC-ENTMCNC: 33.3 G/DL — SIGNIFICANT CHANGE UP (ref 32–37)
MCV RBC AUTO: 85.8 FL — SIGNIFICANT CHANGE UP (ref 81–99)
NRBC # BLD: 0 /100 WBCS — SIGNIFICANT CHANGE UP (ref 0–0)
PHOSPHATE SERPL-MCNC: 3 MG/DL — SIGNIFICANT CHANGE UP (ref 2.1–4.9)
PLATELET # BLD AUTO: 285 K/UL — SIGNIFICANT CHANGE UP (ref 130–400)
POTASSIUM SERPL-MCNC: 4.2 MMOL/L — SIGNIFICANT CHANGE UP (ref 3.5–5)
POTASSIUM SERPL-SCNC: 4.2 MMOL/L — SIGNIFICANT CHANGE UP (ref 3.5–5)
PROTHROM AB SERPL-ACNC: 13.8 SEC — HIGH (ref 9.95–12.87)
RBC # BLD: 3.53 M/UL — LOW (ref 4.2–5.4)
RBC # FLD: 14.2 % — SIGNIFICANT CHANGE UP (ref 11.5–14.5)
SODIUM SERPL-SCNC: 136 MMOL/L — SIGNIFICANT CHANGE UP (ref 135–146)
TYPE + AB SCN PNL BLD: SIGNIFICANT CHANGE UP
WBC # BLD: 9.05 K/UL — SIGNIFICANT CHANGE UP (ref 4.8–10.8)
WBC # FLD AUTO: 9.05 K/UL — SIGNIFICANT CHANGE UP (ref 4.8–10.8)

## 2018-07-30 RX ORDER — SODIUM CHLORIDE 9 MG/ML
1000 INJECTION, SOLUTION INTRAVENOUS
Qty: 0 | Refills: 0 | Status: DISCONTINUED | OUTPATIENT
Start: 2018-07-31 | End: 2018-07-31

## 2018-07-30 RX ORDER — HEPARIN SODIUM 5000 [USP'U]/ML
5000 INJECTION INTRAVENOUS; SUBCUTANEOUS EVERY 12 HOURS
Qty: 0 | Refills: 0 | Status: COMPLETED | OUTPATIENT
Start: 2018-07-30 | End: 2018-07-30

## 2018-07-30 RX ADMIN — SODIUM CHLORIDE 100 MILLILITER(S): 9 INJECTION INTRAMUSCULAR; INTRAVENOUS; SUBCUTANEOUS at 11:54

## 2018-07-30 RX ADMIN — PIPERACILLIN AND TAZOBACTAM 100 GRAM(S): 4; .5 INJECTION, POWDER, LYOPHILIZED, FOR SOLUTION INTRAVENOUS at 21:42

## 2018-07-30 RX ADMIN — PIPERACILLIN AND TAZOBACTAM 100 GRAM(S): 4; .5 INJECTION, POWDER, LYOPHILIZED, FOR SOLUTION INTRAVENOUS at 05:29

## 2018-07-30 RX ADMIN — PANTOPRAZOLE SODIUM 40 MILLIGRAM(S): 20 TABLET, DELAYED RELEASE ORAL at 11:55

## 2018-07-30 RX ADMIN — PIPERACILLIN AND TAZOBACTAM 100 GRAM(S): 4; .5 INJECTION, POWDER, LYOPHILIZED, FOR SOLUTION INTRAVENOUS at 13:59

## 2018-07-30 RX ADMIN — AMLODIPINE BESYLATE 10 MILLIGRAM(S): 2.5 TABLET ORAL at 05:29

## 2018-07-30 RX ADMIN — HEPARIN SODIUM 5000 UNIT(S): 5000 INJECTION INTRAVENOUS; SUBCUTANEOUS at 13:59

## 2018-07-30 RX ADMIN — SODIUM CHLORIDE 100 MILLILITER(S): 9 INJECTION INTRAMUSCULAR; INTRAVENOUS; SUBCUTANEOUS at 00:05

## 2018-07-30 NOTE — PROGRESS NOTE ADULT - SUBJECTIVE AND OBJECTIVE BOX
PGY4 GYN Progress Note    POD #10  Procedure:  ZOILA-BSO for fibroid uterus   # resolved JUAN IDEGO/hyperkalemia  # intrabdominal collection (hematoma? abscess?)    Subjective: Patient is doing well. Pain well controlled. Patient is ambulating, voiding well (dark, mixed with blood). She is tolerating regular diet, passing flatus and bowel movements (diarrhea).  Denies chest pain, SOB, lightheadeness.     Physical exam:    Vital Signs Last 24 Hrs  T(C): 36.3 (30 Jul 2018 04:47), Max: 37.6 (29 Jul 2018 18:06)  T(F): 97.3 (30 Jul 2018 04:47), Max: 99.6 (29 Jul 2018 18:06)  HR: 99 (30 Jul 2018 04:47) (91 - 105)  BP: 147/68 (30 Jul 2018 04:47) (135/65 - 178/83)  RR: 18 (30 Jul 2018 04:47) (18 - 20)        07-28-18 @ 07:01  -  07-29-18 @ 07:00  --------------------------------------------------------  IN: 450 mL / OUT: 402 mL / NET: 48 mL    07-29-18 @ 07:01  -  07-30-18 @ 06:25  --------------------------------------------------------  IN: 0 mL / OUT: 2100 mL / NET: -2100 mL        Gen: NAD, alert and oriented  CVS: RRR s1/s2 no murmurs  Lungs: CTABL  Abdomen: BS+, soft, not tender, not distended, incision C/D/I, healing well  Pelvic: deferred  Ext: No calf tenderness or edema    Diet: NPO for IR procedure    Meds:     amLODIPine   Tablet   10 milliGRAM(s) Oral (07-30-18 @ 05:29)    heparin  Injectable   5000 Unit(s) SubCutaneous (07-29-18 @ 22:18)   5000 Unit(s) SubCutaneous (07-29-18 @ 10:45)    pantoprazole  Injectable   40 milliGRAM(s) IV Push (07-29-18 @ 10:46)    piperacillin/tazobactam IVPB.   100 mL/Hr IV Intermittent (07-30-18 @ 05:29)   100 mL/Hr IV Intermittent (07-29-18 @ 22:18)   100 mL/Hr IV Intermittent (07-29-18 @ 14:06)    sodium chloride 0.9%.   100 mL/Hr IV Continuous (07-30-18 @ 00:00)        LABS:                        9.7    7.86  )-----------( 259      ( 29 Jul 2018 06:53 )             29.1                         9.7    9.20  )-----------( 140      ( 28 Jul 2018 09:39 )             29.5     Magnesium, Serum: 1.9 mg/dL (07-29 @ 06:53)    07-29-18 @ 06:53      138  |  102  |  14  ----------------------------<  107<H>  4.0   |  24  |  0.7    07-28-18 @ 09:39      138  |  99  |  17  ----------------------------<  94  4.3   |  24  |  0.8    07-27-18 @ 14:06      135  |  96<L>  |  19  ----------------------------<  147<H>  4.2   |  25  |  0.7        Ca    9.4      29 Jul 2018 06:53  Ca    9.2      28 Jul 2018 09:39  Ca    10.1      27 Jul 2018 14:06  Phos  2.9     07-29  Phos  3.1     07-28  Phos  3.2     07-27  Mg     1.9     07-29  Mg     2.0     07-28  Mg     2.1     07-27

## 2018-07-30 NOTE — CONSULT NOTE ADULT - SUBJECTIVE AND OBJECTIVE BOX
INTERVENTIONAL RADIOLOGY CONSULT:     Procedure Requested:     HPI:  post hysterectomy with multiloculated abdominal collections, now more coalesced.       PAST MEDICAL & SURGICAL HISTORY:  Fibroids  Arthritis  Obese  Breast cancer: left , had surgery, then tamoxifen  History of surgery: fibroid embolization  H/O left mastectomy      MEDICATIONS  (STANDING):  amLODIPine   Tablet 10 milliGRAM(s) Oral daily  pantoprazole  Injectable 40 milliGRAM(s) IV Push daily  piperacillin/tazobactam IVPB. 3.375 Gram(s) IV Intermittent every 8 hours  sodium chloride 0.9%. 1000 milliLiter(s) (100 mL/Hr) IV Continuous <Continuous>    MEDICATIONS  (PRN):  acetaminophen   Tablet 650 milliGRAM(s) Oral every 6 hours PRN For Temp greater than 38 C (100.4 F)  acetaminophen   Tablet. 650 milliGRAM(s) Oral every 6 hours PRN Mild Pain (1 - 3)  diphenhydrAMINE   Capsule 25 milliGRAM(s) Oral every 4 hours PRN Rash and/or Itching  LORazepam     Tablet 1 milliGRAM(s) Oral once PRN prior to IR procedure  morphine  - Injectable 2 milliGRAM(s) IV Push every 4 hours PRN Severe Pain (7 - 10)  ondansetron   Disintegrating Tablet 4 milliGRAM(s) Oral every 6 hours PRN Nausea and/or Vomiting  ondansetron Injectable 4 milliGRAM(s) IV Push every 6 hours PRN Nausea  ondansetron Injectable 4 milliGRAM(s) IV Push every 6 hours PRN Nausea and/or Vomiting  simethicone 80 milliGRAM(s) Chew every 6 hours PRN Gas      Allergies    Naprosyn (Nausea)    Intolerances        Social History:   Smoking: Yes [ ]  No [ ]   ______pk yrs  ETOH  Yes [ ]  No [ ]  Social [ ]  DRUGS:  Yes [ ]  No [ ]  if so what______________    FAMILY HISTORY:  Family history of prostate cancer in father (Father)      Physical Exam:   Vital Signs Last 24 Hrs  T(C): 36.3 (30 Jul 2018 06:17), Max: 37.6 (29 Jul 2018 18:06)  T(F): 97.3 (30 Jul 2018 06:17), Max: 99.6 (29 Jul 2018 18:06)  HR: 99 (30 Jul 2018 06:17) (91 - 105)  BP: 147/68 (30 Jul 2018 06:17) (135/65 - 178/83)  BP(mean): --  RR: 18 (30 Jul 2018 06:17) (18 - 20)  SpO2: 99% (30 Jul 2018 06:17) (99% - 99%)    Labs:                         10.1   9.05  )-----------( 285      ( 30 Jul 2018 07:14 )             30.3     07-30    136  |  98  |  11  ----------------------------<  85  4.2   |  24  |  0.6<L>    Ca    9.6      30 Jul 2018 07:14  Phos  3.0     07-30  Mg     1.8     07-30      PT/INR - ( 30 Jul 2018 07:14 )   PT: 13.80 sec;   INR: 1.28 ratio         PTT - ( 30 Jul 2018 07:14 )  PTT:34.2 sec    Pertinent labs:                      10.1   9.05  )-----------( 285      ( 30 Jul 2018 07:14 )             30.3       07-30    136  |  98  |  11  ----------------------------<  85  4.2   |  24  |  0.6<L>    Ca    9.6      30 Jul 2018 07:14  Phos  3.0     07-30  Mg     1.8     07-30        PT/INR - ( 30 Jul 2018 07:14 )   PT: 13.80 sec;   INR: 1.28 ratio         PTT - ( 30 Jul 2018 07:14 )  PTT:34.2 sec    Radiology & Additional Studies:     < from: CT Chest w/ IV Cont (07.28.18 @ 00:32) >  EXAM:  CT ABDOMEN AND PELVIS OC IC        EXAM:  CT CHEST IC            PROCEDURE DATE:  07/28/2018            INTERPRETATION:  Clinical History / Reason for exam: Post total abdominal   hysterectomy and bilateral salpingonephrectomy, postoperativeday 7.   Abdominopelvic collection.    TECHNIQUE: Contiguous axial CT images were obtained from the thoracic   inlet to the pubic symphysis following administration of 100c Optiray 320   intravenous contrast.  Oral contrast was not administered.  Reformatted   images in the coronal and sagittal planes were acquired.    COMPARISON CT: CT abdomen and pelvis 7/25/2018.       FINDINGS:    CHEST:    LUNGS: No focal consolidation. Minimal right lower lobe linear   atelectasis.    PLEURA/PERICARDIUM: Nopleural effusion or pericardial effusion.    MEDIASTINUM/THORACIC NODES: Unremarkable.      ABDOMEN/PELVIS:    HEPATOBILIARY: Unremarkable liver. Mild perihepatic/right paracolic   gutter ascites.    SPLEEN: Unremarkable.    PANCREAS: Unremarkable.    ADRENAL GLANDS: Unremarkable.    KIDNEYS: Unremarkable.    ABDOMINOPELVIC NODES: Unremarkable.    PELVIC ORGANS: Post total abdominal hysterectomy and   salpingo-oophorectomy. No pneumoperitoneum.    PERITONEUM/MESENTERY/BOWEL: Previously noted pelvic collection has   increased in size, currently measuring 15.8 x 12.8 x 15.8 cm with thick   wall and increased density dependently, again suspicious for infected   hematoma or abscess. This collection appears separate from bowel loops   opacified with oral contrast. Dilated maximal small bowel loops measuring   up to 5 cm. Several mid to distal small bowel nonobstructive loops   demonstrate circumferential wall thickening in the region of the pelvic   collection. The oral contrast extends into the nondilated loops of small   bowel    BONES/SOFT TISSUES: Post left mastectomy.    OTHER:      IMPRESSION:     Aforementioned abdominopelvic collection appears slightly more prominent   since the prior exam.    Evidence of partial small bowel obstruction with apparent enteritis in   the region of the aforementioned collection.    < end of copied text >  Radiology imaging reviewed.       ASSESSMENT/ PLAN:     41 yo F post hysterectomy with large abdominal fluid collection; low likelihood of infection although likely irritant to bowel and causing ileus.     Will plan for percutaneous drainage in AM. NPO PMN.       Risks, benefits, and alternatives to treatment discussed. All questions answered with understanding.    Thank you for the courtesy of this consult, please call [phone] with any further questions.

## 2018-07-30 NOTE — CONSULT NOTE ADULT - ATTENDING COMMENTS
patient is not septic however given GI symptoms, will plan for percuatneous drainage tomorrow   keep npo after midnight

## 2018-07-30 NOTE — PROGRESS NOTE ADULT - ASSESSMENT
42y P0 with h/o left breast cancer s/p L unilateral mastectomy, morbid obesity, s/p ZOILA BSO for fibroid uterus, EBL 400cc, POD 9, s/p SICU admission due to JUAN DIEGO (now resolved) and hyperkalemia (now resolved), with H/H drop due to acute blood loss anemia, s/p 2u PRBC with stable H/H at the moment, HTN on amlodipine, with intraabdominal multiloculated collection 15.8cm infected hematoma vs abscess, with diarrhea (c. diff negative), afebrile clinically improving    - for IR drainage today  - Heparin dose held for procedure, will restart tonight  - continue Zosyn, if fevers will do blood cultures, will get culture from IR procedure   - Pain management PRN  - Continue SCDs, encourage ambulation  - f/u AM labs ordered  - f/u BPs       Will inform Dr Reed

## 2018-07-31 LAB
ANION GAP SERPL CALC-SCNC: 18 MMOL/L — HIGH (ref 7–14)
BASOPHILS # BLD AUTO: 0.05 K/UL — SIGNIFICANT CHANGE UP (ref 0–0.2)
BASOPHILS NFR BLD AUTO: 0.4 % — SIGNIFICANT CHANGE UP (ref 0–1)
BUN SERPL-MCNC: 14 MG/DL — SIGNIFICANT CHANGE UP (ref 10–20)
CALCIUM SERPL-MCNC: 9.9 MG/DL — SIGNIFICANT CHANGE UP (ref 8.5–10.1)
CHLORIDE SERPL-SCNC: 99 MMOL/L — SIGNIFICANT CHANGE UP (ref 98–110)
CO2 SERPL-SCNC: 20 MMOL/L — SIGNIFICANT CHANGE UP (ref 17–32)
CREAT SERPL-MCNC: 0.7 MG/DL — SIGNIFICANT CHANGE UP (ref 0.7–1.5)
CULTURE RESULTS: SIGNIFICANT CHANGE UP
EOSINOPHIL # BLD AUTO: 0.09 K/UL — SIGNIFICANT CHANGE UP (ref 0–0.7)
EOSINOPHIL NFR BLD AUTO: 0.8 % — SIGNIFICANT CHANGE UP (ref 0–8)
GLUCOSE SERPL-MCNC: 103 MG/DL — HIGH (ref 70–99)
GRAM STN FLD: SIGNIFICANT CHANGE UP
HCT VFR BLD CALC: 33.9 % — LOW (ref 37–47)
HGB BLD-MCNC: 11.3 G/DL — LOW (ref 12–16)
IMM GRANULOCYTES NFR BLD AUTO: 2.3 % — HIGH (ref 0.1–0.3)
LYMPHOCYTES # BLD AUTO: 1.14 K/UL — LOW (ref 1.2–3.4)
LYMPHOCYTES # BLD AUTO: 9.9 % — LOW (ref 20.5–51.1)
MAGNESIUM SERPL-MCNC: 2 MG/DL — SIGNIFICANT CHANGE UP (ref 1.8–2.4)
MCHC RBC-ENTMCNC: 28.7 PG — SIGNIFICANT CHANGE UP (ref 27–31)
MCHC RBC-ENTMCNC: 33.3 G/DL — SIGNIFICANT CHANGE UP (ref 32–37)
MCV RBC AUTO: 86 FL — SIGNIFICANT CHANGE UP (ref 81–99)
MONOCYTES # BLD AUTO: 0.88 K/UL — HIGH (ref 0.1–0.6)
MONOCYTES NFR BLD AUTO: 7.6 % — SIGNIFICANT CHANGE UP (ref 1.7–9.3)
NEUTROPHILS # BLD AUTO: 9.13 K/UL — HIGH (ref 1.4–6.5)
NEUTROPHILS NFR BLD AUTO: 79 % — HIGH (ref 42.2–75.2)
NRBC # BLD: 0 /100 WBCS — SIGNIFICANT CHANGE UP (ref 0–0)
PHOSPHATE SERPL-MCNC: 3.3 MG/DL — SIGNIFICANT CHANGE UP (ref 2.1–4.9)
PLATELET # BLD AUTO: 329 K/UL — SIGNIFICANT CHANGE UP (ref 130–400)
POTASSIUM SERPL-MCNC: 4.9 MMOL/L — SIGNIFICANT CHANGE UP (ref 3.5–5)
POTASSIUM SERPL-SCNC: 4.9 MMOL/L — SIGNIFICANT CHANGE UP (ref 3.5–5)
RBC # BLD: 3.94 M/UL — LOW (ref 4.2–5.4)
RBC # FLD: 14.5 % — SIGNIFICANT CHANGE UP (ref 11.5–14.5)
SODIUM SERPL-SCNC: 137 MMOL/L — SIGNIFICANT CHANGE UP (ref 135–146)
SPECIMEN SOURCE: SIGNIFICANT CHANGE UP
WBC # BLD: 11.56 K/UL — HIGH (ref 4.8–10.8)
WBC # FLD AUTO: 11.56 K/UL — HIGH (ref 4.8–10.8)

## 2018-07-31 RX ORDER — SODIUM CHLORIDE 9 MG/ML
400 INJECTION INTRAMUSCULAR; INTRAVENOUS; SUBCUTANEOUS
Qty: 0 | Refills: 0 | Status: DISCONTINUED | OUTPATIENT
Start: 2018-07-31 | End: 2018-08-01

## 2018-07-31 RX ORDER — HEPARIN SODIUM 5000 [USP'U]/ML
5000 INJECTION INTRAVENOUS; SUBCUTANEOUS EVERY 12 HOURS
Qty: 0 | Refills: 0 | Status: DISCONTINUED | OUTPATIENT
Start: 2018-07-31 | End: 2018-08-01

## 2018-07-31 RX ADMIN — HEPARIN SODIUM 5000 UNIT(S): 5000 INJECTION INTRAVENOUS; SUBCUTANEOUS at 17:44

## 2018-07-31 RX ADMIN — PIPERACILLIN AND TAZOBACTAM 100 GRAM(S): 4; .5 INJECTION, POWDER, LYOPHILIZED, FOR SOLUTION INTRAVENOUS at 05:23

## 2018-07-31 RX ADMIN — PANTOPRAZOLE SODIUM 40 MILLIGRAM(S): 20 TABLET, DELAYED RELEASE ORAL at 11:30

## 2018-07-31 RX ADMIN — Medication 1 TABLET(S): at 17:44

## 2018-07-31 RX ADMIN — SODIUM CHLORIDE 100 MILLILITER(S): 9 INJECTION INTRAMUSCULAR; INTRAVENOUS; SUBCUTANEOUS at 17:47

## 2018-07-31 RX ADMIN — PIPERACILLIN AND TAZOBACTAM 100 GRAM(S): 4; .5 INJECTION, POWDER, LYOPHILIZED, FOR SOLUTION INTRAVENOUS at 15:04

## 2018-07-31 RX ADMIN — AMLODIPINE BESYLATE 10 MILLIGRAM(S): 2.5 TABLET ORAL at 05:23

## 2018-07-31 NOTE — CHART NOTE - NSCHARTNOTEFT_GEN_A_CORE
Registered Dietitian Follow-Up     Patient Profile Reviewed                           Yes [x]   No []     Nutrition History Previously Obtained        Yes [x]  No []       Pertinent Subjective Information:     Pertinent Medical Interventions: Pt to go for percutaneous drainage with IR this morning; will discuss d/c home after IR procedure.      Diet order: NPO      Anthropometrics:  - Ht. 65"  - Wt. 238#/108kg--wt gain likely 2/2 bed-scale inaccuracy     Pertinent Lab Data:      Pertinent Meds: abx, amlodipine, zofran, protonix, simethicone     Physical Findings:  - Appearance:  - GI function:  - Oral/Mouth cavity: denies any difficulty swallowing/chewing   - Skin: Salbador 20, surgical incision      Nutrition Requirements  Weight Used: IBW: 125#/57kg     Estimated Calorie Needs: 0967-5807 kcal/day (25-30 kcal/kg IBW)--obese BMI noted--remains as of 7/27     Estimated Protein Needs: 57-68 gm/day (1-1.2 gm/kg IBW)--remains as of 7/27     Estimated Fluid Needs: 1 ml/kcal     Nutrient Intake:     Previous Nutrition Diagnosis: "Excessive oral intake"--that was selected by accident, meant to select "Inadequate oral intake"              Nutrition Intervention: meals and snacks     Goal/Expected Outcome:     Indicator/Monitoring:  RD to monitor energy intake, glucose profile, nutrition focused physical findings Registered Dietitian Follow-Up     Patient Profile Reviewed                           Yes [x]   No []     Nutrition History Previously Obtained        Yes [x]  No []       Pertinent Subjective Information: Pt is currently NPO, but prior to that pt's appetite/po intake has greatly improved and was eating most of her meal trays.     Pertinent Medical Interventions: Pt to go for percutaneous drainage with IR this morning; will discuss d/c home after IR procedure.      Diet order: NPO      Anthropometrics:  - Ht. 65"  - Wt. 238#/108kg--wt gain likely 2/2 bed-scale inaccuracy     Pertinent Lab Data: Reviewed (7/31): Glu 103      Pertinent Meds: abx, amlodipine, zofran, protonix, simethicone     Physical Findings:  - Appearance: alert and oriented   - GI function: LBM 7/31   - Oral/Mouth cavity: denies any difficulty swallowing/chewing   - Skin: Salbador 20, surgical incision      Nutrition Requirements  Weight Used: IBW: 125#/57kg     Estimated Calorie Needs: 3480-6506 kcal/day (25-30 kcal/kg IBW)--obese BMI noted--remains as of 7/27     Estimated Protein Needs: 57-68 gm/day (1-1.2 gm/kg IBW)--remains as of 7/27     Estimated Fluid Needs: 1 ml/kcal     Nutrient Intake: meeting kcal/pro needs      Previous Nutrition Diagnosis: "Excessive oral intake"--that was selected by accident, meant to select "Inadequate oral intake" (resolved)             Nutrition Intervention: meals and snacks    Recommendations:  1. Please advance diet to previous diet order as medically feasible      Goal/Expected Outcome: Pt to maintain >75% po intake of meals and snacks over the next 7 days      Indicator/Monitoring:  RD to monitor energy intake, glucose profile, nutrition focused physical findings

## 2018-07-31 NOTE — PROGRESS NOTE ADULT - SUBJECTIVE AND OBJECTIVE BOX
INTERVENTIONAL RADIOLOGY BRIEF-OPERATIVE NOTE    Procedure: CT pelvic collection aspiration     Pre-Op Diagnosis: Pelvic collection     Post-Op Diagnosis: Pelvic hematoma     Attending:  Tarun  Resident: None    Anesthesia (type):  [ ] General Anesthesia  [x ] Sedation  [ ] Spinal Anesthesia  [x ] Local/Regional    Contrast: None    Estimated Blood Loss: None    Condition:   [ ] Critical  [ ] Serious  [ ] Fair   [x ] Good    Findings/Follow up Plan of Care:    200 cc of bloody fluid removed  Sample sent to microbiology   Drain removed at end of procedure  see full report in pacs    Specimens Removed: as above    Implants: None    Complications: None    Disposition: Floors      Please call Interventional Radiology [phone] with any questions, concerns, or issues.

## 2018-07-31 NOTE — PROGRESS NOTE ADULT - ASSESSMENT
42y P0 with h/o left breast cancer s/p L unilateral mastectomy, morbid obesity, s/p ZOILA BSO for fibroid uterus, EBL 400cc, POD 11, s/p SICU admission due to JUAN DIEGO (now resolved) and hyperkalemia (now resolved), with H/H drop due to acute blood loss anemia, s/p 2u PRBC with stable H/H at the moment, HTN on amlodipine, with intraabdominal multiloculated collection 15.8cm infected hematoma vs abscess, with diarrhea (c. diff negative), afebrile clinically improving    - IR drainage scheduled for today 9am  - Heparin dose held for procedure  - continue Zosyn, if fevers will do blood cultures, will get cultures from IR procedure   - Pain management PRN  - Continue SCDs, encourage ambulation  - f/u AM labs ordered  - f/u BPs  - will discuss d/c home after IR procedure     Will inform Dr Reed

## 2018-07-31 NOTE — PROGRESS NOTE ADULT - SUBJECTIVE AND OBJECTIVE BOX
PGY4 GYN Progress Note    POD #11  Procedure:  ZOILA-BSO for fibroid uterus   # resolved JUAN DIEGO/hyperkalemia  # intrabdominal collection (hematoma? abscess?)    Subjective: Patient is doing well. Pain well controlled. Patient is ambulating, voiding well (still dark, mixed with blood). She is tolerating regular diet, passing flatus and bowel movements (diarrhea- loose x3 last night).  Denies chest pain, SOB, lightheadeness.     Physical exam:    Vital Signs Last 24 Hrs  T(C): 35.9 (31 Jul 2018 04:48), Max: 36.8 (30 Jul 2018 12:37)  T(F): 96.7 (31 Jul 2018 04:48), Max: 98.2 (30 Jul 2018 12:37)  HR: 98 (31 Jul 2018 04:48) (98 - 103)  BP: 159/80 (31 Jul 2018 04:48) (143/77 - 159/80)  RR: 18 (31 Jul 2018 04:48) (18 - 18)        07-29-18 @ 07:01  -  07-30-18 @ 07:00  --------------------------------------------------------  IN: 0 mL / OUT: 2100 mL / NET: -2100 mL    07-30-18 @ 07:01  -  07-31-18 @ 06:35  --------------------------------------------------------  IN: 100 mL / OUT: 2252 mL / NET: -2152 mL    UO 1350 from [id number] - adequate    Gen: NAD, alert and oriented  CVS: RRR s1/s2 no murmurs  Lungs: CTABL  Abdomen: BS+, soft, not tender, not distended, incision healing well  Pelvic: deferred  Ext: No calf tenderness or edema    Diet: NPO for IR procedure    Meds:     amLODIPine   Tablet   10 milliGRAM(s) Oral (07-31-18 @ 05:23)    heparin  Injectable   5000 Unit(s) SubCutaneous (07-30-18 @ 13:59)    pantoprazole  Injectable   40 milliGRAM(s) IV Push (07-30-18 @ 11:55)    piperacillin/tazobactam IVPB.   100 mL/Hr IV Intermittent (07-31-18 @ 05:23)   100 mL/Hr IV Intermittent (07-30-18 @ 21:42)   100 mL/Hr IV Intermittent (07-30-18 @ 13:59)        LABS:                        10.1   9.05  )-----------( 285      ( 30 Jul 2018 07:14 )             30.3                         9.7    7.86  )-----------( 259      ( 29 Jul 2018 06:53 )             29.1     Magnesium, Serum: 1.8 mg/dL (07-30 @ 07:14)  Antibody Screen: NEG (07-30 @ 07:14)    07-30-18 @ 07:14      136  |  98  |  11  ----------------------------<  85  4.2   |  24  |  0.6<L>    07-29-18 @ 06:53      138  |  102  |  14  ----------------------------<  107<H>  4.0   |  24  |  0.7    07-28-18 @ 09:39      138  |  99  |  17  ----------------------------<  94  4.3   |  24  |  0.8        Ca    9.6      30 Jul 2018 07:14  Ca    9.4      29 Jul 2018 06:53  Ca    9.2      28 Jul 2018 09:39  Phos  3.0     07-30  Phos  2.9     07-29  Phos  3.1     07-28  Mg     1.8     07-30  Mg     1.9     07-29  Mg     2.0     07-28            Allergies    Naprosyn (Nausea)    Intolerances        Culture - Stool (collected 07-28-18 @ 15:25)  Source: .Stool Feces  Preliminary Report (07-30-18 @ 08:29):    No enteric gram negative rods isolated

## 2018-07-31 NOTE — CHART NOTE - NSCHARTNOTEFT_GEN_A_CORE
Patient evaluated at bedside after IR drainage of intra-abdominal hematoma.   Procedure was uncomplicated, 200cc of bloody fluid drained.   Patient feels well after procedure. No complaints.     Vital Signs  T(F): 96.9 HR: 101 BP: 125/58 RR: 18  GEN: NAD alert and oriented    Hematoma fluid sent for Gram stain and culture  Will switch IV Zosyn to Augmentin PO  Restarted on heparin for DVT prophylaxis  Regular diet  Will observe patient overnight, repeat labs in the AM  Anticipate discharge tomorrow    Dr Reed aware

## 2018-08-01 ENCOUNTER — TRANSCRIPTION ENCOUNTER (OUTPATIENT)
Age: 43
End: 2018-08-01

## 2018-08-01 VITALS
SYSTOLIC BLOOD PRESSURE: 142 MMHG | DIASTOLIC BLOOD PRESSURE: 82 MMHG | RESPIRATION RATE: 20 BRPM | HEART RATE: 101 BPM | TEMPERATURE: 97 F

## 2018-08-01 LAB
ANION GAP SERPL CALC-SCNC: 17 MMOL/L — HIGH (ref 7–14)
BASOPHILS # BLD AUTO: 0.05 K/UL — SIGNIFICANT CHANGE UP (ref 0–0.2)
BASOPHILS NFR BLD AUTO: 0.5 % — SIGNIFICANT CHANGE UP (ref 0–1)
BUN SERPL-MCNC: 14 MG/DL — SIGNIFICANT CHANGE UP (ref 10–20)
CALCIUM SERPL-MCNC: 10 MG/DL — SIGNIFICANT CHANGE UP (ref 8.5–10.1)
CHLORIDE SERPL-SCNC: 100 MMOL/L — SIGNIFICANT CHANGE UP (ref 98–110)
CO2 SERPL-SCNC: 19 MMOL/L — SIGNIFICANT CHANGE UP (ref 17–32)
CREAT SERPL-MCNC: 0.7 MG/DL — SIGNIFICANT CHANGE UP (ref 0.7–1.5)
CULTURE RESULTS: SIGNIFICANT CHANGE UP
EOSINOPHIL # BLD AUTO: 0.06 K/UL — SIGNIFICANT CHANGE UP (ref 0–0.7)
EOSINOPHIL NFR BLD AUTO: 0.6 % — SIGNIFICANT CHANGE UP (ref 0–8)
GLUCOSE SERPL-MCNC: 141 MG/DL — HIGH (ref 70–99)
GRAM STN FLD: SIGNIFICANT CHANGE UP
HCT VFR BLD CALC: 35.1 % — LOW (ref 37–47)
HGB BLD-MCNC: 11.6 G/DL — LOW (ref 12–16)
IMM GRANULOCYTES NFR BLD AUTO: 2.6 % — HIGH (ref 0.1–0.3)
LYMPHOCYTES # BLD AUTO: 0.99 K/UL — LOW (ref 1.2–3.4)
LYMPHOCYTES # BLD AUTO: 9.5 % — LOW (ref 20.5–51.1)
MCHC RBC-ENTMCNC: 28.8 PG — SIGNIFICANT CHANGE UP (ref 27–31)
MCHC RBC-ENTMCNC: 33 G/DL — SIGNIFICANT CHANGE UP (ref 32–37)
MCV RBC AUTO: 87.1 FL — SIGNIFICANT CHANGE UP (ref 81–99)
MONOCYTES # BLD AUTO: 0.55 K/UL — SIGNIFICANT CHANGE UP (ref 0.1–0.6)
MONOCYTES NFR BLD AUTO: 5.3 % — SIGNIFICANT CHANGE UP (ref 1.7–9.3)
NEUTROPHILS # BLD AUTO: 8.47 K/UL — HIGH (ref 1.4–6.5)
NEUTROPHILS NFR BLD AUTO: 81.5 % — HIGH (ref 42.2–75.2)
NRBC # BLD: 0 /100 WBCS — SIGNIFICANT CHANGE UP (ref 0–0)
PLATELET # BLD AUTO: 341 K/UL — SIGNIFICANT CHANGE UP (ref 130–400)
POTASSIUM SERPL-MCNC: 4.7 MMOL/L — SIGNIFICANT CHANGE UP (ref 3.5–5)
POTASSIUM SERPL-SCNC: 4.7 MMOL/L — SIGNIFICANT CHANGE UP (ref 3.5–5)
RBC # BLD: 4.03 M/UL — LOW (ref 4.2–5.4)
RBC # FLD: 14.6 % — HIGH (ref 11.5–14.5)
SODIUM SERPL-SCNC: 136 MMOL/L — SIGNIFICANT CHANGE UP (ref 135–146)
SPECIMEN SOURCE: SIGNIFICANT CHANGE UP
SPECIMEN SOURCE: SIGNIFICANT CHANGE UP
WBC # BLD: 10.39 K/UL — SIGNIFICANT CHANGE UP (ref 4.8–10.8)
WBC # FLD AUTO: 10.39 K/UL — SIGNIFICANT CHANGE UP (ref 4.8–10.8)

## 2018-08-01 RX ORDER — AMLODIPINE BESYLATE 2.5 MG/1
1 TABLET ORAL
Qty: 30 | Refills: 2
Start: 2018-08-01

## 2018-08-01 RX ORDER — ASPIRIN/CALCIUM CARB/MAGNESIUM 324 MG
1 TABLET ORAL
Qty: 0 | Refills: 0 | COMMUNITY

## 2018-08-01 RX ORDER — ACETAMINOPHEN 500 MG
2 TABLET ORAL
Qty: 0 | Refills: 0 | DISCHARGE
Start: 2018-08-01

## 2018-08-01 RX ADMIN — Medication 1 TABLET(S): at 05:19

## 2018-08-01 RX ADMIN — HEPARIN SODIUM 5000 UNIT(S): 5000 INJECTION INTRAVENOUS; SUBCUTANEOUS at 05:25

## 2018-08-01 RX ADMIN — AMLODIPINE BESYLATE 10 MILLIGRAM(S): 2.5 TABLET ORAL at 05:19

## 2018-08-01 NOTE — PROGRESS NOTE ADULT - ASSESSMENT
42y P0 with h/o left breast cancer s/p L unilateral mastectomy, morbid obesity, s/p ZOILA BSO for fibroid uterus, EBL 400cc, POD 12, s/p SICU admission due to JUAN DIEGO (now resolved) and hyperkalemia (now resolved), with H/H drop due to acute blood loss anemia, s/p 2u PRBC currently with stable H/H, HTN on amlodipine, with resolving diarrhea (c. diff neg), s/p IR drainage of intra-abdominal hematoma on 7/31, afebrile, doing well overall.  -c/w Augmentin PO  -c/w heparin for DVT prophylaxis  -c/w Regular diet  -f/u CBC, BMP  -likely d/c home today    Dr. Morrow at bedside. Will inform Dr. Reed

## 2018-08-01 NOTE — DISCHARGE NOTE ADULT - CARE PLAN
Principal Discharge DX:	Fibroid uterus  Goal:	healthy woman  Assessment and plan of treatment:	nothing in the vagina for 6 weeks, no tampons, no douching, no baths, no sex. Showers are fine.   Go to the emergency room if you have a temperature greater than 100.4, worsening abdominal pain or increased vaginal bleeding Principal Discharge DX:	Fibroid uterus  Goal:	healthy woman  Assessment and plan of treatment:	nothing in the vagina for 6 weeks, no tampons, no douching, no baths, no sex. Showers are fine.   Go to the emergency room if you have a temperature greater than 100.4, worsening abdominal pain or increased vaginal bleeding  An appointment has been made for you for 8/10 at 12 pm  Please take the medications as prescribed

## 2018-08-01 NOTE — DISCHARGE NOTE ADULT - HOSPITAL COURSE
DATE OF DISCHARGE: 08-01-18 @ 10:06    HISTORY OF PRESENT ILLNESS: HPI: 42y P0 h/o breast cancer 2017, with symptomatic large fibroid uterus s/p ZOILA-BSO, EBL 400cc,    PAST MEDICAL & SURGICAL HISTORY:  Fibroids, Arthritis, Obese, Breast cancer: left , had surgery, then tamoxifen  History of surgery: fibroid embolization, H/O left mastectomy    PROCEDURES PERFORMED: Primary procedure ZOILA/BSO   Postoperative COMPLICATIONS:  resolved JUAN DIEGO/Hyperkalemia, fevers, abdominal hematoma s/p IR drainage on 7/31  Postoperative course:  7/20 uncomplicated ZOILA/BSO   7/21: transferred to SICU for monitoring for decr urine output, hyperkalemia (received toradol 30mg intraop) => s/p calcium gluconate + insulin + D50; Nephro consult: differential included prerenal versus ATN , episode of hypotension; H/H was found to be dropping from starting 14.3/43.3 to 8.8/26.2, Creatinine rising from preop 0.7 to 1.1 on POD1. Patient received 2u PRBC. EKG done found to be Sinus tachycardia.  7/22: downgraded from SICU; post transfusion h/h 8.3/25.3, decreasing Cr level  7/23: patient found to have rising BPs in the 190s, nephro reconsulted, started on Amlodipine 5 mg and Lasix; urine output adequate, passed flatus; Amlodipine increased to 10 mg  7/25: patient found to have worsening abdominal pain despite PO pain meds; suspicion for hematoma; Increased WBC to 16K, zosyn started; Lasix stopped. Last fever 101F @1347, afebrile since; CT abdomen/pelvis: 1.  Post ZOILA/BSO with a 11.9 x 10.8 x 18 cm complex, thick walled fluid collection in the pelvis, suspicious for an infected hematoma or abscess. Hepatomegaly and hepatic steatosis. s/p IR consult - no drainage (multiloculated)  7/26 - 7/29: continued on zosyn, afebrile; started having diarrhea (green and watery) -> D/C'd colace & f/u stool cx (pending), ova&parasites- received; IR  reconsulted for second CT scan that showed on impression Aforementioned abdominopelvic collection appears slightly more prominent since the prior exam. Evidence of partial small bowel obstruction with apparent enteritis in the region of the aforementioned collection  IR reconsulted and will consider drainage on 7/30 7/30: c. diff cultures came back negative; per IR, scheduled for drainage of hematoma on 7/31 7/31 s/p IR drainage @ 1400 - 200cc of bloody fluid. f/u culture; Switched from IV zosyn to Augmentin PO BID (discussed over the phone with ID)  8/1: patient discharged home, afebrile, labs below; postoperative instructions for follow up given. Antibiotics, pain meds, and home meds sent                        11.6   10.39 )-----------( 341      ( 01 Aug 2018 08:49 )             35.1              07-31-18 @ 08:25    137  |  99  |  14  ----------------------------<  103<H>  4.9   |  20  |  0.7        Culture - Body Fluid with Gram Stain (collected 07-31-18 @ 14:16)  Source: .Body Fluid Abdominal Fluid  Gram Stain (08-01-18 @ 02:09):   polymorphonuclear leukocytes seen,  No organisms seen  by cytocentrifuge    Culture - Stool (collected 07-28-18 @ 15:25) Source: .Stool Feces, Final Report (07-31-18 @ 11:17):  No enteric gram negative rods isolated  No enteric pathogens isolated.  (Stool culture examined for Salmonella,  Shigella, Campylobacter, Aeromonas, Plesiomonas,  Vibrio, E.coli O157 and Yersinia)    MEDICATIONS  (STANDING):  amLODIPine   Tablet 10 milliGRAM(s) Oral daily  amoxicillin  875 milliGRAM(s)/clavulanate 1 Tablet(s) Oral two times a day  heparin  Injectable 5000 Unit(s) SubCutaneous every 12 hours  pantoprazole  Injectable 40 milliGRAM(s) IV Push daily  sodium chloride 0.9%. 400 milliLiter(s) (100 mL/Hr) IV Continuous <Continuous>

## 2018-08-01 NOTE — PROGRESS NOTE ADULT - PROVIDER SPECIALTY LIST ADULT
GYN
Gyn Onc
Intervent Radiology
Nephrology
SICU
GYN
Gyn Onc
Nephrology

## 2018-08-01 NOTE — DISCHARGE NOTE ADULT - CARE PROVIDER_API CALL
Antonio Pa), Gynecologic Oncology; Obstetrics and Gynecology  [address] [place] [address]  Phone: [phone]  Fax: [phone]

## 2018-08-01 NOTE — DISCHARGE NOTE ADULT - PATIENT PORTAL LINK FT
You can access the [facility] Patient Portal, offered by [facility], by registering with the following website: [email or web address]

## 2018-08-01 NOTE — PROGRESS NOTE ADULT - SUBJECTIVE AND OBJECTIVE BOX
PGY2 Note:    Pt doing well, pain well controlled. No overnight events, no acute complaints. Pt continues to ambulate and void without difficulty, she continues to tolerate regular diet. Diarrhea greatly improved.       PAST MEDICAL & SURGICAL HISTORY:  Fibroids  Arthritis  Obese  Breast cancer: left , had surgery, then tamoxifen  History of surgery: fibroid embolization  H/O left mastectomy    Physical Exam  Vital Signs Last 24 Hrs  T(F): 96.7 (01 Aug 2018 06:22), Max: 100.3 (31 Jul 2018 15:30)  HR: 101 (01 Aug 2018 06:22) (95 - 104)  BP: 142/82 (01 Aug 2018 06:22) (125/58 - 152/69)  RR: 20 (01 Aug 2018 06:22) (18 - 20)    Gen: AAOx3, NAD  Chest: S1S2 RRR, lungs CTA bilaterally  Abd: Soft, appropriately tender, mildly distended, BS+  Incision: Clean, dry, intact steris in place.  Ext: No calf tenderness, no swelling    Labs:                        11.3   11.56 )-----------( 329      ( 31 Jul 2018 08:25 )             33.9                         10.1   9.05  )-----------( 285      ( 30 Jul 2018 07:14 )             30.3     /4.9/99/20/14/0.7<103 7/31 @0825    IR drained hematoma gram stain - PMN, no organisms seen    CBC, BMP pending for this AM PGY2 and 4 Note:    Pt doing well, pain well controlled. No overnight events, no acute complaints. Pt continues to ambulate and void without difficulty, she continues to tolerate regular diet. Diarrhea greatly improved.       PAST MEDICAL & SURGICAL HISTORY:  Fibroids  Arthritis  Obese  Breast cancer: left , had surgery, then tamoxifen  History of surgery: fibroid embolization  H/O left mastectomy    Physical Exam  Vital Signs Last 24 Hrs  T(F): 96.7 (01 Aug 2018 06:22), Max: 100.3 (31 Jul 2018 15:30)  HR: 101 (01 Aug 2018 06:22) (95 - 104)  BP: 142/82 (01 Aug 2018 06:22) (125/58 - 152/69)  RR: 20 (01 Aug 2018 06:22) (18 - 20)    Gen: AAOx3, NAD  Chest: S1S2 RRR, lungs CTA bilaterally  Abd: Soft, appropriately tender, mildly distended, BS+  Incision: Clean, dry, intact steris in place.  Ext: No calf tenderness, no swelling    Labs:                        11.3   11.56 )-----------( 329      ( 31 Jul 2018 08:25 )             33.9                         10.1   9.05  )-----------( 285      ( 30 Jul 2018 07:14 )             30.3     /4.9/99/20/14/0.7<103 7/31 @0825    IR drained hematoma gram stain - PMN, no organisms seen    CBC, BMP pending for this AM

## 2018-08-01 NOTE — DISCHARGE NOTE ADULT - MEDICATION SUMMARY - MEDICATIONS TO TAKE
I will START or STAY ON the medications listed below when I get home from the hospital:    Actamin 325 mg oral tablet  -- 2 tab(s) by mouth every 6 hours, As needed, For Temp greater than 38 C (100.4 F)  -- Indication: For pain    oxyCODONE-acetaminophen 5 mg-325 mg oral tablet  -- 1 tab(s) by mouth every 6 hours MDD:4  -- Caution federal law prohibits the transfer of this drug to any person other  than the person for whom it was prescribed.  May cause drowsiness.  Alcohol may intensify this effect.  Use care when operating dangerous machinery.  This prescription cannot be refilled.  This product contains acetaminophen.  Do not use  with any other product containing acetaminophen to prevent possible liver damage.  Using more of this medication than prescribed may cause serious breathing problems.    -- Indication: For pain    tamoxifen 20 mg oral tablet  -- 1 tab(s) by mouth once a day (at bedtime)  -- Indication: For h/o breast cancer    amLODIPine 10 mg oral tablet  -- 1 tab(s) by mouth once a day  -- Indication: For HTN    amoxicillin-clavulanate 875 mg-125 mg oral tablet  -- 1 tab(s) by mouth 2 times a day  -- Indication: For infection

## 2018-08-01 NOTE — DISCHARGE NOTE ADULT - PLAN OF CARE
healthy woman nothing in the vagina for 6 weeks, no tampons, no douching, no baths, no sex. Showers are fine.   Go to the emergency room if you have a temperature greater than 100.4, worsening abdominal pain or increased vaginal bleeding nothing in the vagina for 6 weeks, no tampons, no douching, no baths, no sex. Showers are fine.   Go to the emergency room if you have a temperature greater than 100.4, worsening abdominal pain or increased vaginal bleeding  An appointment has been made for you for 8/10 at 12 pm  Please take the medications as prescribed

## 2018-08-03 ENCOUNTER — APPOINTMENT (OUTPATIENT)
Dept: GYNECOLOGIC ONCOLOGY | Facility: CLINIC | Age: 43
End: 2018-08-03

## 2018-08-04 DIAGNOSIS — D25.1 INTRAMURAL LEIOMYOMA OF UTERUS: ICD-10-CM

## 2018-08-04 DIAGNOSIS — E66.01 MORBID (SEVERE) OBESITY DUE TO EXCESS CALORIES: ICD-10-CM

## 2018-08-04 DIAGNOSIS — Z85.3 PERSONAL HISTORY OF MALIGNANT NEOPLASM OF BREAST: ICD-10-CM

## 2018-08-04 DIAGNOSIS — D62 ACUTE POSTHEMORRHAGIC ANEMIA: ICD-10-CM

## 2018-08-04 DIAGNOSIS — I10 ESSENTIAL (PRIMARY) HYPERTENSION: ICD-10-CM

## 2018-08-04 DIAGNOSIS — E87.5 HYPERKALEMIA: ICD-10-CM

## 2018-08-04 DIAGNOSIS — K91.31 POSTPROCEDURAL PARTIAL INTESTINAL OBSTRUCTION: ICD-10-CM

## 2018-08-04 DIAGNOSIS — E87.2 ACIDOSIS: ICD-10-CM

## 2018-08-04 DIAGNOSIS — E87.70 FLUID OVERLOAD, UNSPECIFIED: ICD-10-CM

## 2018-08-04 DIAGNOSIS — L76.32 POSTPROCEDURAL HEMATOMA OF SKIN AND SUBCUTANEOUS TISSUE FOLLOWING OTHER PROCEDURE: ICD-10-CM

## 2018-08-04 DIAGNOSIS — D25.2 SUBSEROSAL LEIOMYOMA OF UTERUS: ICD-10-CM

## 2018-08-04 DIAGNOSIS — R00.0 TACHYCARDIA, UNSPECIFIED: ICD-10-CM

## 2018-08-04 DIAGNOSIS — N17.9 ACUTE KIDNEY FAILURE, UNSPECIFIED: ICD-10-CM

## 2018-08-04 DIAGNOSIS — I95.81 POSTPROCEDURAL HYPOTENSION: ICD-10-CM
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
## 2018-08-04 DIAGNOSIS — E86.1 HYPOVOLEMIA: ICD-10-CM

## 2018-08-04 DIAGNOSIS — Z88.8 ALLERGY STATUS TO OTHER DRUGS, MEDICAMENTS AND BIOLOGICAL SUBSTANCES STATUS: ICD-10-CM

## 2018-08-05 LAB
CULTURE RESULTS: SIGNIFICANT CHANGE UP
SPECIMEN SOURCE: SIGNIFICANT CHANGE UP

## 2018-08-10 ENCOUNTER — APPOINTMENT (OUTPATIENT)
Dept: GYNECOLOGIC ONCOLOGY | Facility: CLINIC | Age: 43
End: 2018-08-10

## 2018-08-10 ENCOUNTER — OUTPATIENT (OUTPATIENT)
Dept: OUTPATIENT SERVICES | Facility: HOSPITAL | Age: 43
LOS: 1 days | Discharge: HOME | End: 2018-08-10

## 2018-08-10 VITALS
DIASTOLIC BLOOD PRESSURE: 83 MMHG | BODY MASS INDEX: 37.65 KG/M2 | TEMPERATURE: 97.8 F | HEIGHT: 65 IN | WEIGHT: 226 LBS | SYSTOLIC BLOOD PRESSURE: 146 MMHG | HEART RATE: 115 BPM | RESPIRATION RATE: 16 BRPM

## 2018-08-10 DIAGNOSIS — Z90.12 ACQUIRED ABSENCE OF LEFT BREAST AND NIPPLE: Chronic | ICD-10-CM

## 2018-08-10 DIAGNOSIS — Z98.890 OTHER SPECIFIED POSTPROCEDURAL STATES: Chronic | ICD-10-CM

## 2018-08-13 DIAGNOSIS — Z48.816 ENCOUNTER FOR SURGICAL AFTERCARE FOLLOWING SURGERY ON THE GENITOURINARY SYSTEM: ICD-10-CM

## 2019-08-19 ENCOUNTER — APPOINTMENT (OUTPATIENT)
Dept: PLASTIC SURGERY | Facility: CLINIC | Age: 44
End: 2019-08-19
Payer: COMMERCIAL

## 2019-08-19 VITALS — WEIGHT: 190 LBS | BODY MASS INDEX: 31.65 KG/M2 | HEIGHT: 65 IN

## 2019-08-19 DIAGNOSIS — Z85.3 PERSONAL HISTORY OF MALIGNANT NEOPLASM OF BREAST: ICD-10-CM

## 2019-08-19 DIAGNOSIS — Z78.9 OTHER SPECIFIED HEALTH STATUS: ICD-10-CM

## 2019-08-19 PROCEDURE — 99203 OFFICE O/P NEW LOW 30 MIN: CPT

## 2019-08-19 RX ORDER — AMLODIPINE BESYLATE 10 MG/1
10 TABLET ORAL
Refills: 0 | Status: ACTIVE | COMMUNITY

## 2019-08-19 NOTE — HISTORY OF PRESENT ILLNESS
[FreeTextEntry1] : Pt is a 44 y/o F, G0, with PMH of left breast CA s/p left mastectomy and SLNBx with Dr. Mims ?2016, and HTN, who presents for breast reconstruction consultation. Pt first found the lesion on initial screening mammogram; has not done any mammograms prior. Now up to date with annual mammograms and US. Last mammogram April 2019- normal per pt (done at [facility]). No CTX or XRT. On Tamoxifen x5 years. Reports chronic skin irritation to bilateral IMFs but denies neck/back or shoulder pain. Had a right breast I&D vs lumpectomy done- ?abscess; pt reports "bacteria", no malignancy. \par \par Ht 5'5" Wt 190lb; has been losing weight with diet\par Current bra size: unknown - happy with current size\par +FHx: sister- DCIS, dx at age 48; cousin, dx at age 40\par Oncologist: Landon\par Denies hx of VTE or MRSA infections\par Occ: [facility]

## 2019-08-19 NOTE — REASON FOR VISIT
[Initial Evaluation] : an initial evaluation [FreeTextEntry1] : Oncologist Dr. Navarrete; PMD: Johanne

## 2019-08-19 NOTE — PHYSICAL EXAM
[de-identified] : well-appearing, NAD [de-identified] : EOMI [de-identified] : good inspiratory effort [de-identified] : no trunk deformities\par palpable LD muscle with moderate soft tissue thickness [de-identified] : Right breast: large pendulous breast with no palpable masses, nipple retraction or discharge. Well healed superior periareolar scar with minor contour depression at 9 o'clock\par Left breast: absence of breast; well-healed mastectomy scar, soft and flat, mastectomy flaps well-perfused; no palpable masses\par Bilateral moderate hanging axillary roll\par Bilateral inframammary folds with superior abdominal wall dermatitis\par  [de-identified] : NT, protuberant, softly distended with healed lower midline scar involving umbilicus, no palpable hernias

## 2019-08-19 NOTE — ASSESSMENT
[FreeTextEntry1] : 42 y/o F with hx of left breast cancer s/p mastectomy, no XRT/CTX\par \par Patient desires delayed left breast reconstruction and is anxious about reconstruction.\par \par -The patient was counseled about reconstructive options following mastectomy, including autologous, prosthetic, and the options of foregoing reconstruction. \par -The patient was extensively counseled on the operation and the perioperative recoveries of both autologous and prosthetic reconstructions. The patient understands the likely position of scars and the use of surgical drains. The patient understands the risks with abdominally based microsurgical reconstruction including partial or total flap loss, revisit to the operating room in the tyree-operative period, wound dehiscence, mastectomy skin flap necrosis, fat necrosis, abdominal wall morbidity (laxity, bulge, hernia), asymmetry, parasthesia, seroma, hematoma, and infection. She also understands the risks with implant-based reconstruction including hematoma, seroma, infection, implant malposition, extrusion, deflation, capsular contracture, mastectomy skin flap necrosis, wound dehiscence, asymmetry, paresthesia, small risk of breast-implant associated lymphoma,  likelihood of requiring additional procedures related to the implant over the course of her lifetime, possible need for additional surgery including revision and/or autologous tissue reconstruction (e.g. pedicled latissismus dorsi flap, TDAP flap, etc).  She was also informed on the off-label use of biologic mesh, its benefits, risks and alternatives. She was given the opportunity to ask questions; and all were answered to her satisfaction at this time.\par \par -She is a good candidate for staged prosthetic breast reconstruction. After a long discussion she has decided to proceed with delayed staged prosthetic breast reconstruction with tissue expander with mesh, followed by silicone implant at a later date. \par -Patient would like to consider her options and she is leaning toward implant-based reconstruction.\par -Before proceeding, recommend obtaining operative notes and pathology from Dr. Mims, and most recent mammogram results\par -Pt is an excellent candidate for TE-to-implant based reconstruction, and right breast reduction for symmetry at a later date following expander to implant exchange.\par -Continue weight loss\par -F/u 3 months after weight loss\par \par Photos were taken

## 2019-11-22 ENCOUNTER — APPOINTMENT (OUTPATIENT)
Dept: PLASTIC SURGERY | Facility: CLINIC | Age: 44
End: 2019-11-22
Payer: COMMERCIAL

## 2019-11-22 DIAGNOSIS — Z78.9 OTHER SPECIFIED HEALTH STATUS: ICD-10-CM

## 2019-11-22 PROCEDURE — 99213 OFFICE O/P EST LOW 20 MIN: CPT

## 2019-11-22 NOTE — ASSESSMENT
[FreeTextEntry1] : 42 y/o F with hx of left breast cancer s/p mastectomy, no XRT/CTX\par \par Patient is a good candidate to proceed with delayed left breast reconstruction.\par \par -She is a good candidate for and I recommended staged left breast implant-based breast reconstruction. After a long discussion she has decided to proceed with delayed staged implant breast reconstruction with tissue expander with mesh, followed by silicone implant at a later date. \par \par -The patient was counseled about reconstructive options for left surgical, including autologous, prosthetic, and the options of foregoing reconstruction. \par -The patient and father was counseled on the operation and the perioperative recoveries of both autologous and prosthetic reconstructions. The patient understands the likely position of scars and the use of surgical drains. The patient understands the risks with abdominally based microsurgical reconstruction including partial or total flap loss, revisit to the operating room in the tyree-operative period, wound dehiscence, mastectomy skin flap necrosis, fat necrosis, abdominal wall morbidity (laxity, bulge, hernia), asymmetry, paresthesia, seroma, hematoma, and infection. She also understands the risks with implant-based reconstruction including hematoma, seroma, infection, implant malposition, extrusion, deflation, capsular contracture, mastectomy skin flap necrosis, wound dehiscence, asymmetry, paresthesia, small risk of breast-implant associated lymphoma,  likelihood of requiring additional procedures related to the implant over the course of her lifetime, possible need for additional surgery including revision and/or autologous tissue reconstruction (e.g. pedicled latissismus dorsi flap, TDAP flap, etc).  She was also informed on the off-label use of biologic mesh, its benefits, risks and alternatives. She was given the opportunity to ask questions; and all were answered to her satisfaction at this time.\par -Before proceeding, recommend obtaining operative notes and pathology from Dr. Mims, and most recent mammogram results\par -Pt is an excellent candidate and I recommend for TE-to-implant based reconstruction, and right breast reduction for symmetry at a later date following expander to implant exchange.\par -Continue weight loss\par -Patient confirmed and agreed to the recommended left breast reconstructive plan.  Informed consent was obtained.\par -Will schedule after medical clearance\par

## 2019-11-22 NOTE — HISTORY OF PRESENT ILLNESS
[FreeTextEntry1] : Pt is a 44 y/o F, G0, with PMH of left breast CA s/p left mastectomy and SLNBx with Dr. Mims ?2016, and HTN, who presents for breast reconstruction consultation. Pt first found the lesion on initial screening mammogram; has not done any mammograms prior. Now up to date with annual mammograms and US. Last mammogram April 2019- normal per pt (done at [facility]). No CTX or XRT. On Tamoxifen x5 years. Reports chronic skin irritation to bilateral IMFs but denies neck/back or shoulder pain. Had a right breast I&D vs lumpectomy done- ?abscess; pt reports "bacteria", no malignancy. \par \par Ht 5'5" Wt 190lb; has been losing weight with diet\par Current bra size: unknown - happy with current size\par +FHx: sister- DCIS, dx at age 48; cousin, dx at age 40\par Oncologist: Landon\par Denies hx of VTE or MRSA infections\par Occ: [facility] [employer]\par \par Interval hx (11/22/19): Pt presents for f/u.  She has lost weight and her clothes fit better per pt.  She now weight in the 180s (lbs).  Pt feels ready to proceed with left breast reconstruction with TE/Alloderm.  Father is present for the discussion.  Pt weighed today; 187 lbs.

## 2019-11-22 NOTE — PHYSICAL EXAM
[de-identified] : well-appearing, NAD [de-identified] : good inspiratory effort [de-identified] : no trunk deformities\par palpable LD muscle with moderate soft tissue thickness [de-identified] : Right breast: large pendulous breast with no palpable masses, nipple retraction or discharge. Well healed superior periareolar scar with minor contour depression at 9 o'clock\par Left breast: absence of breast; well-healed mastectomy scar, soft and flat, mastectomy flaps well-perfused; no palpable masses\par Bilateral moderate hanging axillary roll\par Bilateral inframammary folds with no active rash [de-identified] : NT, protuberant, softly distended with healed lower midline scar involving umbilicus, no palpable hernias\par Mild improvement of excess adiposity

## 2020-02-27 DIAGNOSIS — G89.18 OTHER ACUTE POSTPROCEDURAL PAIN: ICD-10-CM

## 2020-02-27 DIAGNOSIS — M79.2 NEURALGIA AND NEURITIS, UNSPECIFIED: ICD-10-CM

## 2020-03-03 ENCOUNTER — OUTPATIENT (OUTPATIENT)
Dept: OUTPATIENT SERVICES | Facility: HOSPITAL | Age: 45
LOS: 1 days | Discharge: HOME | End: 2020-03-03
Payer: COMMERCIAL

## 2020-03-03 ENCOUNTER — RESULT REVIEW (OUTPATIENT)
Age: 45
End: 2020-03-03

## 2020-03-03 ENCOUNTER — APPOINTMENT (OUTPATIENT)
Dept: PLASTIC SURGERY | Facility: AMBULATORY SURGERY CENTER | Age: 45
End: 2020-03-03
Payer: COMMERCIAL

## 2020-03-03 VITALS
SYSTOLIC BLOOD PRESSURE: 123 MMHG | OXYGEN SATURATION: 95 % | TEMPERATURE: 98 F | DIASTOLIC BLOOD PRESSURE: 63 MMHG | RESPIRATION RATE: 16 BRPM | HEART RATE: 76 BPM

## 2020-03-03 VITALS
HEIGHT: 65 IN | RESPIRATION RATE: 18 BRPM | DIASTOLIC BLOOD PRESSURE: 81 MMHG | TEMPERATURE: 99 F | OXYGEN SATURATION: 100 % | WEIGHT: 186.95 LBS | SYSTOLIC BLOOD PRESSURE: 153 MMHG | HEART RATE: 66 BPM

## 2020-03-03 DIAGNOSIS — Z90.12 ACQUIRED ABSENCE OF LEFT BREAST AND NIPPLE: Chronic | ICD-10-CM

## 2020-03-03 DIAGNOSIS — Z98.890 OTHER SPECIFIED POSTPROCEDURAL STATES: Chronic | ICD-10-CM

## 2020-03-03 PROCEDURE — 19357 TISS XPNDR PLMT BRST RCNSTJ: CPT | Mod: LT

## 2020-03-03 PROCEDURE — 99024 POSTOP FOLLOW-UP VISIT: CPT

## 2020-03-03 PROCEDURE — 15777 ACELLULAR DERM MATRIX IMPLT: CPT | Mod: 59

## 2020-03-03 PROCEDURE — 88304 TISSUE EXAM BY PATHOLOGIST: CPT | Mod: 26

## 2020-03-03 RX ORDER — TRAMADOL HYDROCHLORIDE 50 MG/1
1 TABLET ORAL
Qty: 16 | Refills: 0
Start: 2020-03-03 | End: 2020-03-06

## 2020-03-03 RX ORDER — ONDANSETRON 8 MG/1
4 TABLET, FILM COATED ORAL ONCE
Refills: 0 | Status: DISCONTINUED | OUTPATIENT
Start: 2020-03-03 | End: 2020-03-18

## 2020-03-03 RX ORDER — SODIUM CHLORIDE 9 MG/ML
1000 INJECTION, SOLUTION INTRAVENOUS
Refills: 0 | Status: DISCONTINUED | OUTPATIENT
Start: 2020-03-03 | End: 2020-03-18

## 2020-03-03 RX ORDER — ACETAMINOPHEN 500 MG
650 TABLET ORAL ONCE
Refills: 0 | Status: DISCONTINUED | OUTPATIENT
Start: 2020-03-03 | End: 2020-03-18

## 2020-03-03 RX ORDER — MORPHINE SULFATE 50 MG/1
2 CAPSULE, EXTENDED RELEASE ORAL
Refills: 0 | Status: DISCONTINUED | OUTPATIENT
Start: 2020-03-03 | End: 2020-03-03

## 2020-03-03 RX ORDER — DIAZEPAM 5 MG
1 TABLET ORAL
Qty: 15 | Refills: 0
Start: 2020-03-03 | End: 2020-03-07

## 2020-03-03 RX ORDER — HYDROMORPHONE HYDROCHLORIDE 2 MG/ML
0.5 INJECTION INTRAMUSCULAR; INTRAVENOUS; SUBCUTANEOUS
Refills: 0 | Status: DISCONTINUED | OUTPATIENT
Start: 2020-03-03 | End: 2020-03-03

## 2020-03-03 RX ADMIN — HYDROMORPHONE HYDROCHLORIDE 0.5 MILLIGRAM(S): 2 INJECTION INTRAMUSCULAR; INTRAVENOUS; SUBCUTANEOUS at 01:52

## 2020-03-03 RX ADMIN — HYDROMORPHONE HYDROCHLORIDE 0.5 MILLIGRAM(S): 2 INJECTION INTRAMUSCULAR; INTRAVENOUS; SUBCUTANEOUS at 15:13

## 2020-03-03 RX ADMIN — SODIUM CHLORIDE 100 MILLILITER(S): 9 INJECTION, SOLUTION INTRAVENOUS at 14:37

## 2020-03-03 NOTE — ASU DISCHARGE PLAN (ADULT/PEDIATRIC) - CARE PROVIDER_API CALL
Ana Rosa Christianson)  [facility]  [address] [place] [address], [address]  [place] [address]  Phone: [phone]  Fax: [phone]  Follow Up Time:

## 2020-03-03 NOTE — ASU PATIENT PROFILE, ADULT - MENTAL HEALTH CONDITIONS/SYMPTOMS, PROFILE
none
Agree with above resident note.  2y9mM full term, healthy, IUTD, RSV as  presents with 2 weeks of URI symptoms, cough/congestion.  Consistent with viral URI but also found to have acute OM on exam, No signs at all of mastoiditis.  Will give a dose of ceftriaxone as mom says it is very hard to get child to take medication.  No signs of dehydration.  CXR to rule out pneumonia.  No concern for systemic infection or meningitis with well-appearance, VSS, WWP, normal neurological exam and no meningismus.  No signs of seizure.  Doesn't sound like patient actually syncopized but will check labs and EKG.  Kianna Becerra MD

## 2020-03-03 NOTE — BRIEF OPERATIVE NOTE - NSICDXBRIEFPREOP_GEN_ALL_CORE_FT
PRE-OP DIAGNOSIS:  Acquired absence of breast, left 03-Mar-2020 14:41:46 s/p mastectomy Dianne Dos Santos

## 2020-03-03 NOTE — CHART NOTE - NSCHARTNOTEFT_GEN_A_CORE
PACU ANESTHESIA ADMISSION NOTE      Procedure: Implantation, acellular dermal matrix, for breast reconstruction: left breast  Delayed reconstruction of breast using tissue expander: left breast    Post op diagnosis:  Acquired absence of left breast      ____  Intubated  TV:______       Rate: ______      FiO2: ______    _x___  Patent Airway    _x___  Full return of protective reflexes    _x___  Full recovery from anesthesia / back to baseline status    Vitals:  T 97.8  HR: 83  BP: 151/79  RR: 16  SpO2: 96% on NC 3 L/min    Mental Status:  _x___ Awake   __x___ Alert   _____ Drowsy   _____ Sedated    Nausea/Vomiting:  _x___  NO       ______Yes,   See Post - Op Orders         Pain Scale (0-10):  __0___    Treatment: _x___ None    ____ See Post - Op/PCA Orders    Post - Operative Fluids:   ___ Oral   __x__ See Post - Op Orders    Plan: Discharge:   _x___Home       _____Floor     _____Critical Care    _____  Other:_________________    Comments:  No anesthesia issues or complications noted.  Discharge when criteria met.

## 2020-03-03 NOTE — ASU DISCHARGE PLAN (ADULT/PEDIATRIC) - ASU DC SPECIAL INSTRUCTIONSFT
Keep surgical site clean and dry.   Do not remove any dressings or get them wet.   Wear surgical bra at all times.   Empty and strip drain as instructed and record the output.   Rest, no lifting!

## 2020-03-03 NOTE — BRIEF OPERATIVE NOTE - NSICDXBRIEFPROCEDURE_GEN_ALL_CORE_FT
PROCEDURES:  Implantation, acellular dermal matrix, for breast reconstruction 03-Mar-2020 14:43:32 left breast Dianne Dos Santos  Delayed reconstruction of breast using tissue expander 03-Mar-2020 14:42:52 left breast Dianne Dos Santos

## 2020-03-03 NOTE — ASU DISCHARGE PLAN (ADULT/PEDIATRIC) - PAIN MANAGEMENT
Doxycycline, continue taking pres-op medication (Gabapentin/Celebrex, Tylenol). Take Tramadol as needed for severe pain/[facility] Doxy[facility], continue taking pres-op medication (Gabapentin/Celebrex, Tylenol) for 3 days twice daily, Valium for muscle spasm as needed. Take Tramadol as needed for severe pain/Prescriptions electronically submitted to pharmacy from [facility]

## 2020-03-03 NOTE — BRIEF OPERATIVE NOTE - NSICDXBRIEFPOSTOP_GEN_ALL_CORE_FT
POST-OP DIAGNOSIS:  Acquired absence of left breast 03-Mar-2020 14:42:09 s/p mastectomy Dianne Dos Santos

## 2020-03-06 LAB — SURGICAL PATHOLOGY STUDY: SIGNIFICANT CHANGE UP

## 2020-03-11 ENCOUNTER — APPOINTMENT (OUTPATIENT)
Dept: PLASTIC SURGERY | Facility: CLINIC | Age: 45
End: 2020-03-11
Payer: COMMERCIAL

## 2020-03-11 VITALS — BODY MASS INDEX: 32.15 KG/M2 | HEIGHT: 65 IN | WEIGHT: 193 LBS

## 2020-03-11 PROCEDURE — 99024 POSTOP FOLLOW-UP VISIT: CPT

## 2020-03-11 NOTE — PHYSICAL EXAM
[de-identified] : well-appearing, NAD [de-identified] : good inspiratory effort [de-identified] : no trunk deformities\par palpable LD muscle with moderate soft tissue thickness [de-identified] : Right breast: large pendulous breast with no palpable masses, nipple retraction or discharge. Well healed superior periareolar scar with minor contour depression at 9 o'clock\par Left breast: absence of breast; well-healed mastectomy scar, soft and flat, mastectomy flaps well-perfused; no palpable masses\par Bilateral moderate hanging axillary roll\par Bilateral inframammary folds with no active rash [de-identified] : NT, protuberant, softly distended with healed lower midline scar involving umbilicus, no palpable hernias\par Mild improvement of excess adiposity

## 2020-03-11 NOTE — HISTORY OF PRESENT ILLNESS
[FreeTextEntry1] : Pt is a 42 y/o F, G0, with PMH of left breast CA s/p left mastectomy and SLNBx with Dr. Mims ?2016, and HTN, who presents for breast reconstruction consultation. Pt first found the lesion on initial screening mammogram; has not done any mammograms prior. Now up to date with annual mammograms and US. Last mammogram April 2019- normal per pt (done at [facility]). No CTX or XRT. On Tamoxifen x5 years. Reports chronic skin irritation to bilateral IMFs but denies neck/back or shoulder pain. Had a right breast I&D vs lumpectomy done- ?abscess; pt reports "bacteria", no malignancy. \par \par Ht 5'5" Wt 190lb; has been losing weight with diet\par Current bra size: unknown - happy with current size\par +FHx: sister- DCIS, dx at age 48; cousin, dx at age 40\par Oncologist: Landon\par Denies hx of VTE or MRSA infections\par Occ: [facility] [employer]\par \par Interval hx (11/22/19): Pt presents for f/u.  She has lost weight and her clothes fit better per pt.  She now weight in the 180s (lbs).  Pt feels ready to proceed with left breast reconstruction with TE/Alloderm.  Father is present for the discussion.  Pt weighed today; 187 lbs.\par \par Interval hx (3/11/20): Pt presents for first postoperative visit- POD #8 s/p delayed left breast reconstruction with TE/alloderm. Denies f/c, pain, CP/SOB, or calf pain. Completed PO abx. Drain output: 38/34/60.

## 2020-03-11 NOTE — ASSESSMENT
[FreeTextEntry1] : 44 y/o F with hx of left breast cancer s/p mastectomy, no XRT/CTX\par Now POD #8 s/p delayed left breast reconstruction with TE/alloderm\par Tolerated first TE fill in office today\par \par -30cc injected today, awaiting operative report for intraoperative fill volume\par -Bandages changed\par -Continue drain care\par -Postop instructions reviewed: ambulate, no heavy lifting\par -F/u 1 week for serial TE fill

## 2020-03-12 DIAGNOSIS — Z79.810 LONG TERM (CURRENT) USE OF SELECTIVE ESTROGEN RECEPTOR MODULATORS (SERMS): ICD-10-CM

## 2020-03-12 DIAGNOSIS — I10 ESSENTIAL (PRIMARY) HYPERTENSION: ICD-10-CM

## 2020-03-12 DIAGNOSIS — Z42.1 ENCOUNTER FOR BREAST RECONSTRUCTION FOLLOWING MASTECTOMY: ICD-10-CM

## 2020-03-12 DIAGNOSIS — Z85.3 PERSONAL HISTORY OF MALIGNANT NEOPLASM OF BREAST: ICD-10-CM

## 2020-03-12 DIAGNOSIS — Z90.12 ACQUIRED ABSENCE OF LEFT BREAST AND NIPPLE: ICD-10-CM

## 2020-03-18 ENCOUNTER — APPOINTMENT (OUTPATIENT)
Dept: PLASTIC SURGERY | Facility: CLINIC | Age: 45
End: 2020-03-18
Payer: COMMERCIAL

## 2020-03-18 PROCEDURE — 99024 POSTOP FOLLOW-UP VISIT: CPT

## 2020-03-18 NOTE — ASSESSMENT
[FreeTextEntry1] : 44 y/o F with hx of left breast cancer s/p mastectomy, no XRT/CTX\par Now POD #15 s/p delayed left breast reconstruction with TE/alloderm\par No\par Tolerated Left TE fill in office today\par \par -60cc injected today\par -Sutures removed\par -Drain removed; met criteria\par -C/w ambulate, no heavy lifting\par -Start daily aquaphor to incision\par -May start scar creams\par -Surgical bra at all times\par -F/u 1 week for serial TE fill

## 2020-03-18 NOTE — HISTORY OF PRESENT ILLNESS
[FreeTextEntry1] : Pt is a 42 y/o F, G0, with PMH of left breast CA s/p left mastectomy and SLNBx with Dr. Mims ?2016, and HTN, who presents for breast reconstruction consultation. Pt first found the lesion on initial screening mammogram; has not done any mammograms prior. Now up to date with annual mammograms and US. Last mammogram April 2019- normal per pt (done at [facility]). No CTX or XRT. On Tamoxifen x5 years. Reports chronic skin irritation to bilateral IMFs but denies neck/back or shoulder pain. Had a right breast I&D vs lumpectomy done- ?abscess; pt reports "bacteria", no malignancy. \par \par Ht 5'5" Wt 190lb; has been losing weight with diet\par Current bra size: unknown - happy with current size\par +FHx: sister- DCIS, dx at age 48; cousin, dx at age 40\par Oncologist: Landon\par Denies hx of VTE or MRSA infections\par Occ: [employer]\par \par Interval hx (11/22/19): Pt presents for f/u.  She has lost weight and her clothes fit better per pt.  She now weight in the 180s (lbs).  Pt feels ready to proceed with left breast reconstruction with TE/Alloderm.  Father is present for the discussion.  Pt weighed today; 187 lbs.\par \par Interval hx (3/11/20): Pt presents for first postoperative visit- POD #8 s/p delayed left breast reconstruction with TE/alloderm. Denies f/c, pain, CP/SOB, or calf pain. Completed PO abx. Drain output: 38/34/60.\par \par Interval hx (3/18/20):  Here for 2nd post-op visit POD#15 s/p delayed left breast reconstruction with TE/alldoerm. Drains 38/32/10.  Denies fevers, chills, redness, swelling.  complaint with lifting restrictions

## 2020-03-18 NOTE — PROCEDURE
[FreeTextEntry2] : TE fill [FreeTextEntry6] : The port location was identified and marked on the skin with the aid of the implant magnet. The skin was prepped in sterile fashion. Sterile saline was injected. The skin flaps remained well perfused, pink with capillary refill <2 sec. The patient tolerated the procedure.\par \par Left TE pre-expansion volume: 380 ml\par Total volume right TE after after expansion: 440 ml\par

## 2020-03-18 NOTE — PHYSICAL EXAM
[de-identified] : well-appearing, NAD [de-identified] : good inspiratory effort [de-identified] : no trunk deformities\par palpable LD muscle with moderate soft tissue thickness [de-identified] : Right breast: large pendulous breast with no palpable masses, nipple retraction or discharge. Well healed superior periareolar scar with minor contour depression at 9 o'clock\par Left breast: absence of breast; well-healed mastectomy scar, soft and flat, mastectomy flaps well-perfused with TE in good position, no palpable fluid collection, Cullen drain with serous output\par Bilateral moderate hanging axillary roll\par Bilateral inframammary folds with no active rash [de-identified] : NT, protuberant, softly distended with healed lower midline scar involving umbilicus, no palpable hernias\par Mild improvement of excess adiposity

## 2020-03-23 ENCOUNTER — APPOINTMENT (OUTPATIENT)
Dept: PLASTIC SURGERY | Facility: CLINIC | Age: 45
End: 2020-03-23
Payer: COMMERCIAL

## 2020-03-23 PROCEDURE — 99024 POSTOP FOLLOW-UP VISIT: CPT

## 2020-03-23 NOTE — PROCEDURE
[FreeTextEntry2] : TE fill [FreeTextEntry6] : The port location was identified and marked on the skin with the aid of the implant magnet. The skin was prepped in sterile fashion. Sterile saline was injected. The skin flaps remained well perfused, pink with capillary refill <2 sec. The patient tolerated the procedure.\par \par Left TE pre-expansion volume: 440 ml\par Total volume right TE after after expansion: 530 ml\par

## 2020-03-23 NOTE — HISTORY OF PRESENT ILLNESS
[FreeTextEntry1] : Pt is a 42 y/o F, G0, with PMH of left breast CA s/p left mastectomy and SLNBx with Dr. Mims ?2016, and HTN, who presents for breast reconstruction consultation. Pt first found the lesion on initial screening mammogram; has not done any mammograms prior. Now up to date with annual mammograms and US. Last mammogram April 2019- normal per pt (done at [facility]). No CTX or XRT. On Tamoxifen x5 years. Reports chronic skin irritation to bilateral IMFs but denies neck/back or shoulder pain. Had a right breast I&D vs lumpectomy done- ?abscess; pt reports "bacteria", no malignancy. \par \par Ht 5'5" Wt 190lb; has been losing weight with diet\par Current bra size: unknown - happy with current size\par +FHx: sister- DCIS, dx at age 48; cousin, dx at age 40\par Oncologist: Landon\par Denies hx of VTE or MRSA infections\par Occ: [employer]\par \par Interval hx (11/22/19): Pt presents for f/u.  She has lost weight and her clothes fit better per pt.  She now weight in the 180s (lbs).  Pt feels ready to proceed with left breast reconstruction with TE/Alloderm.  Father is present for the discussion.  Pt weighed today; 187 lbs.\par \par Interval hx (3/11/20): Pt presents for first postoperative visit- POD #8 s/p delayed left breast reconstruction with TE/alloderm. Denies f/c, pain, CP/SOB, or calf pain. Completed PO abx. Drain output: 38/34/60.\par \par Interval hx (3/18/20):  Here for 2nd post-op visit POD#15 s/p delayed left breast reconstruction with TE/alldoerm. Drains 38/32/10.  Denies fevers, chills, redness, swelling.  complaint with lifting restrictions\par \par Interval hx (3/23/20): Here for serial TE fill.  Pt tolerated last fill without issue.   Denies fevers, chills, redness, swelling. c/w lifting restrictions

## 2020-03-23 NOTE — ASSESSMENT
[FreeTextEntry1] : 42 y/o F with hx of left breast cancer s/p mastectomy, no XRT/CTX\par Now POD # 20 s/p delayed left breast reconstruction with TE/alloderm\par No\par Tolerated Left TE fill in office today, 90 cc today\par \par -60cc injected today\par -C/w ambulate, no heavy lifting\par -c/w scar creams\par -Surgical bra at all times\par -F/u 1 week for serial TE fill

## 2020-03-23 NOTE — PHYSICAL EXAM
[de-identified] : well-appearing, NAD [de-identified] : good inspiratory effort [de-identified] : no trunk deformities\par palpable LD muscle with moderate soft tissue thickness [de-identified] : Right breast: large pendulous breast with no palpable masses, nipple retraction or discharge. Well healed superior periareolar scar with minor contour depression at 9 o'clock\par Left breast: absence of breast; well-healed mastectomy scar, soft and flat, mastectomy flaps well-perfused with TE in good position, no palpable fluid collection, Cullen drain with serous output\par Bilateral moderate hanging axillary roll\par Bilateral inframammary folds with no active rash [de-identified] : NT, protuberant, softly distended with healed lower midline scar involving umbilicus, no palpable hernias\par Mild improvement of excess adiposity

## 2020-04-01 ENCOUNTER — APPOINTMENT (OUTPATIENT)
Dept: PLASTIC SURGERY | Facility: CLINIC | Age: 45
End: 2020-04-01
Payer: COMMERCIAL

## 2020-04-01 PROCEDURE — 99024 POSTOP FOLLOW-UP VISIT: CPT

## 2020-04-01 NOTE — ASSESSMENT
[FreeTextEntry1] : 44 y/o F with hx of left breast cancer s/p mastectomy, no XRT/CTX.\par Now POD # 29 s/p delayed left breast reconstruction with TE/AlloDerm in the process of expansion.  \par Tolerated Left TE fill in office today, 80 cc today.\par \par -C/w ambulate, no heavy lifting\par -c/w scar creams\par -Surgical bra at all times\par -Pt knows to call for fevers, chills, redness, swelling\par -F/u 1 week for serial TE fill

## 2020-04-01 NOTE — PROCEDURE
[FreeTextEntry2] : TE fill [FreeTextEntry6] : The port location was identified and marked on the skin with the aid of the implant magnet. The skin was prepped in sterile fashion. Sterile saline was injected. The skin flaps remained well perfused, pink with capillary refill <2 sec. The patient tolerated the procedure.\par \par Left TE pre-expansion volume: 530 ml\par Total volume right TE after after expansion: 610 ml\par

## 2020-04-01 NOTE — PHYSICAL EXAM
[de-identified] : well-appearing, NAD [de-identified] : Right breast: large pendulous breast with no palpable masses, nipple retraction or discharge. Well healed superior periareolar scar with minor contour depression at 9 o'clock\par Left breast: absence of breast; well-healed mastectomy scar, soft and flat, mastectomy flaps well-perfused with TE in good position\par Bilateral moderate hanging axillary roll\par Bilateral inframammary folds with no active rash

## 2020-04-01 NOTE — HISTORY OF PRESENT ILLNESS
[FreeTextEntry1] : 43 y/o F, G0, with PMH of left breast CA s/p left mastectomy and SLNBx with Dr. Mims ?2016, and HTN, who presents for breast reconstruction consultation. Pt first found the lesion on initial screening mammogram; has not done any mammograms prior. Now up to date with annual mammograms and US. Last mammogram April 2019- normal per pt (done at [facility]). No CTX or XRT. On Tamoxifen x5 years. Reports chronic skin irritation to bilateral IMFs but denies neck/back or shoulder pain. Had a right breast I&D vs lumpectomy done- ?abscess; pt reports "bacteria", no malignancy. \par \par Ht 5'5" Wt 190lb; has been losing weight with diet\par Current bra size: unknown - happy with current size\par +FHx: sister- DCIS, dx at age 48; cousin, dx at age 40\par Oncologist: Landon\par Denies hx of VTE or MRSA infections\par Occ: [employer]\par \par Interval hx (11/22/19): Pt presents for f/u.  She has lost weight and her clothes fit better per pt.  She now weight in the 180s (lbs).  Pt feels ready to proceed with left breast reconstruction with TE/Alloderm.  Father is present for the discussion.  Pt weighed today; 187 lbs.\par \par Interval hx (3/11/20): Pt presents for first postoperative visit- POD #8 s/p delayed left breast reconstruction with TE/alloderm. Denies f/c, pain, CP/SOB, or calf pain. Completed PO abx. Drain output: 38/34/60.\par \par Interval hx (3/18/20):  Here for 2nd post-op visit POD#15 s/p delayed left breast reconstruction with TE/alldoerm. Drains 38/32/10.  Denies fevers, chills, redness, swelling.  complaint with lifting restrictions\par \par Interval hx (3/23/20): Here for serial TE fill.  Pt tolerated last fill without issue.   Denies fevers, chills, redness, swelling. c/w lifting restrictions\par \par Interval hx (4/1/20). Patient presents today POD#29 for f/u and serial TE fill. Doing well and reporting no new complaints. Tolerating expansion without any issues.

## 2020-04-08 ENCOUNTER — APPOINTMENT (OUTPATIENT)
Dept: PLASTIC SURGERY | Facility: CLINIC | Age: 45
End: 2020-04-08
Payer: COMMERCIAL

## 2020-04-08 PROCEDURE — 99024 POSTOP FOLLOW-UP VISIT: CPT

## 2020-04-08 NOTE — PHYSICAL EXAM
[de-identified] : well-appearing, NAD [de-identified] : Right breast: large pendulous breast with no palpable masses, nipple retraction or discharge. Well healed superior periareolar scar with minor contour depression at 9 o'clock\par Left breast: absence of breast; well-healed mastectomy scar, soft and flat, mastectomy flaps well-perfused with TE in good position\par Bilateral moderate hanging axillary roll\par Bilateral inframammary folds with no active rash

## 2020-04-08 NOTE — HISTORY OF PRESENT ILLNESS
[FreeTextEntry1] : 43 y/o F, G0, with PMH of left breast CA s/p left mastectomy and SLNBx with Dr. Mims ?2016, and HTN, who presents for breast reconstruction consultation. Pt first found the lesion on initial screening mammogram; has not done any mammograms prior. Now up to date with annual mammograms and US. Last mammogram April 2019- normal per pt (done at [facility]). No CTX or XRT. On Tamoxifen x5 years. Reports chronic skin irritation to bilateral IMFs but denies neck/back or shoulder pain. Had a right breast I&D vs lumpectomy done- ?abscess; pt reports "bacteria", no malignancy. \par \par Ht 5'5" Wt 190lb; has been losing weight with diet\par Current bra size: unknown - happy with current size\par +FHx: sister- DCIS, dx at age 48; cousin, dx at age 40\par Oncologist: Landon\par Denies hx of VTE or MRSA infections\par Occ: [employer]\par \par Interval hx (11/22/19): Pt presents for f/u.  She has lost weight and her clothes fit better per pt.  She now weight in the 180s (lbs).  Pt feels ready to proceed with left breast reconstruction with TE/Alloderm.  Father is present for the discussion.  Pt weighed today; 187 lbs.\par \par Interval hx (3/11/20): Pt presents for first postoperative visit- POD #8 s/p delayed left breast reconstruction with TE/alloderm. Denies f/c, pain, CP/SOB, or calf pain. Completed PO abx. Drain output: 38/34/60.\par \par Interval hx (3/18/20):  Here for 2nd post-op visit POD#15 s/p delayed left breast reconstruction with TE/alldoerm. Drains 38/32/10.  Denies fevers, chills, redness, swelling.  complaint with lifting restrictions\par \par Interval hx (3/23/20): Here for serial TE fill.  Pt tolerated last fill without issue.   Denies fevers, chills, redness, swelling. c/w lifting restrictions\par \par Interval hx (4/1/20). Patient presents today POD#29 for f/u and serial TE fill. Doing well and reporting no new complaints. Tolerating expansion without any issues. \par \par Interval hx (4/8/20): Pt presents for POD #36 visit and serial TE fill. Reports last fill over 80cc was slightly more painful and less well tolerated as compared to 60cc. Otherwise feeling well.

## 2020-04-08 NOTE — ASSESSMENT
[FreeTextEntry1] : 42 y/o F with hx of left breast cancer s/p mastectomy, no XRT/CTX.\par Now POD # 36 s/p delayed left breast reconstruction with TE/AlloDerm in the process of expansion.  \par Tolerated Left TE fill in office today, 60 cc today.\par Target TE volume (with overfill): 880 ml\par \par -C/w ambulate, no heavy lifting\par -c/w scar creams\par -Surgical bra at all times\par -Pt knows to call for fevers, chills, redness, swelling\par -F/u 1 week for serial TE fill

## 2020-04-08 NOTE — PROCEDURE
[FreeTextEntry2] : TE fill [FreeTextEntry6] : The port location was identified and marked on the skin with the aid of the implant magnet. The skin was prepped in sterile fashion. Sterile saline was injected. The skin flaps remained well perfused, pink with capillary refill <2 sec. The patient tolerated the procedure.\par \par Left TE pre-expansion volume: 610 ml\par Total volume right TE after after expansion: 670 ml\par

## 2020-04-15 ENCOUNTER — APPOINTMENT (OUTPATIENT)
Dept: PLASTIC SURGERY | Facility: CLINIC | Age: 45
End: 2020-04-15
Payer: COMMERCIAL

## 2020-04-15 PROCEDURE — 99024 POSTOP FOLLOW-UP VISIT: CPT

## 2020-04-15 NOTE — ASSESSMENT
[FreeTextEntry1] : 42 y/o F with hx of left breast cancer s/p mastectomy, no XRT/CTX.\par Now POD # 43 s/p delayed left breast reconstruction with TE/AlloDerm in the process of expansion.  \par Tolerated Left TE fill in office today, 60 cc today.\par Target TE volume (with overfill): 880 ml\par \par -C/w ambulate, no heavy lifting\par -c/w scar creams\par -Surgical bra at all times\par -Pt knows to call for fevers, chills, redness, swelling\par -F/u 1 week for serial TE fill

## 2020-04-15 NOTE — PROCEDURE
[FreeTextEntry2] : TE fill [FreeTextEntry6] : The port location was identified and marked on the skin with the aid of the implant magnet. The skin was prepped in sterile fashion. Sterile saline was injected. The skin flaps remained well perfused, pink with capillary refill <2 sec. The patient tolerated the procedure.\par \par Left TE pre-expansion volume: 670 ml\par Total volume right TE after after expansion: 730 ml\par

## 2020-04-15 NOTE — PHYSICAL EXAM
[de-identified] : well-appearing, NAD [de-identified] : Right breast: large pendulous breast with no palpable masses, nipple retraction or discharge. Well healed superior periareolar scar with minor contour depression at 9 o'clock\par Left breast: absence of breast; well-healed mastectomy scar, soft and flat, mastectomy flaps well-perfused with TE in good position\par Bilateral moderate hanging axillary roll\par Bilateral inframammary folds with no active rash

## 2020-04-15 NOTE — HISTORY OF PRESENT ILLNESS
[FreeTextEntry1] : 45 y/o F, G0, with PMH of left breast CA s/p left mastectomy and SLNBx with Dr. Mims ?2016, and HTN, who presents for breast reconstruction consultation. Pt first found the lesion on initial screening mammogram; has not done any mammograms prior. Now up to date with annual mammograms and US. Last mammogram April 2019- normal per pt (done at [facility]). No CTX or XRT. On Tamoxifen x5 years. Reports chronic skin irritation to bilateral IMFs but denies neck/back or shoulder pain. Had a right breast I&D vs lumpectomy done- ?abscess; pt reports "bacteria", no malignancy. \par \par Ht 5'5" Wt 190lb; has been losing weight with diet\par Current bra size: unknown - happy with current size\par +FHx: sister- DCIS, dx at age 48; cousin, dx at age 40\par Oncologist: Landon\par Denies hx of VTE or MRSA infections\par Occ: [employer]\par \par Interval hx (11/22/19): Pt presents for f/u.  She has lost weight and her clothes fit better per pt.  She now weight in the 180s (lbs).  Pt feels ready to proceed with left breast reconstruction with TE/Alloderm.  Father is present for the discussion.  Pt weighed today; 187 lbs.\par \par Interval hx (3/11/20): Pt presents for first postoperative visit- POD #8 s/p delayed left breast reconstruction with TE/alloderm. Denies f/c, pain, CP/SOB, or calf pain. Completed PO abx. Drain output: 38/34/60.\par \par Interval hx (3/18/20):  Here for 2nd post-op visit POD#15 s/p delayed left breast reconstruction with TE/alldoerm. Drains 38/32/10.  Denies fevers, chills, redness, swelling.  complaint with lifting restrictions\par \par Interval hx (3/23/20): Here for serial TE fill.  Pt tolerated last fill without issue.   Denies fevers, chills, redness, swelling. c/w lifting restrictions\par \par Interval hx (4/1/20). Patient presents today POD#29 for f/u and serial TE fill. Doing well and reporting no new complaints. Tolerating expansion without any issues. \par \par Interval hx (4/8/20): Pt presents for POD #36 visit and serial TE fill. Reports last fill over 80cc was slightly more painful and less well tolerated as compared to 60cc. Otherwise feeling well.\par \par Interval hx (4/15/20): Pt presents for POD #43 visit and serial TE fill. Feeling well and tolerated last fill.

## 2020-04-22 ENCOUNTER — APPOINTMENT (OUTPATIENT)
Dept: PLASTIC SURGERY | Facility: CLINIC | Age: 45
End: 2020-04-22
Payer: COMMERCIAL

## 2020-04-22 PROCEDURE — 99024 POSTOP FOLLOW-UP VISIT: CPT

## 2020-04-22 NOTE — ASSESSMENT
[FreeTextEntry1] : 44 y/o F with hx of left breast cancer s/p mastectomy, no XRT/CTX.\par Now POD # 50 s/p delayed left breast reconstruction with TE/AlloDerm in the process of expansion.  \par Tolerated Left TE fill in office today, 60 cc today.\par Target TE volume (with overfill): 880 ml\par \par -C/w ambulate\par -May resume light physical activity at home\par -c/w scar creams\par -Surgical bra at all times\par -Pt knows to call for fevers, chills, redness, swelling\par -F/u 1 week for serial TE fill

## 2020-04-22 NOTE — PROCEDURE
[FreeTextEntry2] : TE fill [FreeTextEntry6] : The port location was identified and marked on the skin with the aid of the implant magnet. The skin was prepped in sterile fashion. Sterile saline was injected. The skin flaps remained well perfused, pink with capillary refill <2 sec. The patient tolerated the procedure.\par \par Left TE pre-expansion volume: 730 ml\par Total volume right TE after after expansion: 790 ml\par

## 2020-04-22 NOTE — HISTORY OF PRESENT ILLNESS
[FreeTextEntry1] : 43 y/o F, G0, with PMH of left breast CA s/p left mastectomy and SLNBx with Dr. Mims ?2016, and HTN, who presents for breast reconstruction consultation. Pt first found the lesion on initial screening mammogram; has not done any mammograms prior. Now up to date with annual mammograms and US. Last mammogram April 2019- normal per pt (done at [facility]). No CTX or XRT. On Tamoxifen x5 years. Reports chronic skin irritation to bilateral IMFs but denies neck/back or shoulder pain. Had a right breast I&D vs lumpectomy done- ?abscess; pt reports "bacteria", no malignancy. \par \par Ht 5'5" Wt 190lb; has been losing weight with diet\par Current bra size: unknown - happy with current size\par +FHx: sister- DCIS, dx at age 48; cousin, dx at age 40\par Oncologist: Landon\par Denies hx of VTE or MRSA infections\par Occ: [employer]\par \par Interval hx (11/22/19): Pt presents for f/u.  She has lost weight and her clothes fit better per pt.  She now weight in the 180s (lbs).  Pt feels ready to proceed with left breast reconstruction with TE/Alloderm.  Father is present for the discussion.  Pt weighed today; 187 lbs.\par \par Interval hx (3/11/20): Pt presents for first postoperative visit- POD #8 s/p delayed left breast reconstruction with TE/alloderm. Denies f/c, pain, CP/SOB, or calf pain. Completed PO abx. Drain output: 38/34/60.\par \par Interval hx (3/18/20):  Here for 2nd post-op visit POD#15 s/p delayed left breast reconstruction with TE/alldoerm. Drains 38/32/10.  Denies fevers, chills, redness, swelling.  complaint with lifting restrictions\par \par Interval hx (3/23/20): Here for serial TE fill.  Pt tolerated last fill without issue.   Denies fevers, chills, redness, swelling. c/w lifting restrictions\par \par Interval hx (4/1/20). Patient presents today POD#29 for f/u and serial TE fill. Doing well and reporting no new complaints. Tolerating expansion without any issues. \par \par Interval hx (4/8/20): Pt presents for POD #36 visit and serial TE fill. Reports last fill over 80cc was slightly more painful and less well tolerated as compared to 60cc. Otherwise feeling well.\par \par Interval hx (4/15/20): Pt presents for POD #43 visit and serial TE fill. Feeling well and tolerated last fill.\par \par Interval hx (4/22/20): Pt presents for POD #50 visit and serial TE fill. Feeling well and tolerated last fill.

## 2020-04-29 ENCOUNTER — APPOINTMENT (OUTPATIENT)
Dept: PLASTIC SURGERY | Facility: CLINIC | Age: 45
End: 2020-04-29
Payer: COMMERCIAL

## 2020-04-29 PROCEDURE — 99024 POSTOP FOLLOW-UP VISIT: CPT

## 2020-04-29 NOTE — PROCEDURE
[FreeTextEntry2] : TE fill [FreeTextEntry6] : The port location was identified and marked on the skin with the aid of the implant magnet. The skin was prepped in sterile fashion. Sterile saline was injected. The skin flaps remained well perfused, pink with capillary refill <2 sec. The patient tolerated the procedure.\par \par Left TE pre-expansion volume: 790 ml\par Total volume right TE after after expansion: 850 ml\par

## 2020-04-29 NOTE — ASSESSMENT
[FreeTextEntry1] : 44 y/o F with hx of left breast cancer s/p mastectomy, no XRT/CTX.\par Now POD # 57 s/p delayed left breast reconstruction with TE/AlloDerm in the process of expansion.  \par Tolerated Left TE fill in office today, 60 cc today.\par Target TE volume (with overfill): 900 ml\par \par -Regarding implant exchange, the benefits, risks, and outcomes of implant surgery discussed.  The risks included but are not limited to bleeding, infection, seroma, hematoma, paraesthesia, poor wound healing, implant failure (infection, extrusion, rupture), implant malposition, capsular contracture, asymmetry, small risk of breast-implant associated lymphoma, possible need for additional planned or unplanned surgery including revision and/or autologous tissue reconstruction (e.g. pedicled latissimus dorsi flap, TDAP flap, etc), and dissatisfaction with outcome.  We also discussed the likelihood of additional surgery on the breast over the course of her lifetime.\par -Reviewed implant options (saline vs silicone), the benefits, risks and alternatives.  Pt selected silicone options.\par -All questions were answered to the patient's apparent satisfaction, and informed consent was obtained.\par -C/w ambulate\par -c/w scar creams\par -Loose sports bra at all times\par -Pt knows to call for fevers, chills, redness, swelling\par -Will schedule left tissue expander exchange to silicone implant in 7 weeks.\par -F/u 1 week for serial TE overfill\par \par Pre-op photos (at 850 ml) taken

## 2020-04-29 NOTE — PHYSICAL EXAM
[de-identified] : well-appearing, NAD [de-identified] : Right breast: large pendulous breast with no palpable masses, nipple retraction or discharge. Well healed superior periareolar scar with minor contour depression at 9 o'clock\par Left breast: absence of breast; well-healed mastectomy scar, soft and flat, mastectomy flaps well-perfused with TE in good position\par Bilateral moderate hanging axillary roll\par Bilateral inframammary folds with no active rash

## 2020-04-29 NOTE — HISTORY OF PRESENT ILLNESS
[FreeTextEntry1] : 43 y/o F, G0, with PMH of left breast CA s/p left mastectomy and SLNBx with Dr. Mims ?2016, and HTN, who presents for breast reconstruction consultation. Pt first found the lesion on initial screening mammogram; has not done any mammograms prior. Now up to date with annual mammograms and US. Last mammogram April 2019- normal per pt (done at [facility]). No CTX or XRT. On Tamoxifen x5 years. Reports chronic skin irritation to bilateral IMFs but denies neck/back or shoulder pain. Had a right breast I&D vs lumpectomy done- ?abscess; pt reports "bacteria", no malignancy. \par \par Ht 5'5" Wt 190lb; has been losing weight with diet\par Current bra size: unknown - happy with current size\par +FHx: sister- DCIS, dx at age 48; cousin, dx at age 40\par Oncologist: Landon\par Denies hx of VTE or MRSA infections\par Occ: [employer]\par \par Interval hx (11/22/19): Pt presents for f/u.  She has lost weight and her clothes fit better per pt.  She now weight in the 180s (lbs).  Pt feels ready to proceed with left breast reconstruction with TE/Alloderm.  Father is present for the discussion.  Pt weighed today; 187 lbs.\par \par Interval hx (3/11/20): Pt presents for first postoperative visit- POD #8 s/p delayed left breast reconstruction with TE/alloderm. Denies f/c, pain, CP/SOB, or calf pain. Completed PO abx. Drain output: 38/34/60.\par \par Interval hx (3/18/20):  Here for 2nd post-op visit POD#15 s/p delayed left breast reconstruction with TE/alldoerm. Drains 38/32/10.  Denies fevers, chills, redness, swelling.  complaint with lifting restrictions\par \par Interval hx (3/23/20): Here for serial TE fill.  Pt tolerated last fill without issue.   Denies fevers, chills, redness, swelling. c/w lifting restrictions\par \par Interval hx (4/1/20). Patient presents today POD#29 for f/u and serial TE fill. Doing well and reporting no new complaints. Tolerating expansion without any issues. \par \par Interval hx (4/8/20): Pt presents for POD #36 visit and serial TE fill. Reports last fill over 80cc was slightly more painful and less well tolerated as compared to 60cc. Otherwise feeling well.\par \par Interval hx (4/15/20): Pt presents for POD #43 visit and serial TE fill. Feeling well and tolerated last fill.\par \par Interval hx (4/22/20): Pt presents for POD #50 visit and serial TE fill. Feeling well and tolerated last fill.\par \par Interval hx (4/29/20): Pt presents for POD #57 visit and serial TE fill. Feeling well and tolerated last fill.

## 2020-05-07 ENCOUNTER — APPOINTMENT (OUTPATIENT)
Dept: PLASTIC SURGERY | Facility: CLINIC | Age: 45
End: 2020-05-07
Payer: COMMERCIAL

## 2020-05-07 DIAGNOSIS — Z90.10 ACQUIRED ABSENCE OF UNSPECIFIED BREAST AND NIPPLE: ICD-10-CM

## 2020-05-07 PROCEDURE — 99024 POSTOP FOLLOW-UP VISIT: CPT

## 2020-05-07 NOTE — PHYSICAL EXAM
[de-identified] : Right breast: large pendulous breast with no palpable masses, nipple retraction or discharge. Well healed superior periareolar scar with minor contour depression at 9 o'clock\par Left breast: absence of breast; well-healed mastectomy scar, soft and flat, mastectomy flaps well-perfused with TE in good position\par Bilateral moderate hanging axillary roll\par Bilateral inframammary folds with no active rash [de-identified] : well-appearing, NAD

## 2020-05-07 NOTE — PROCEDURE
[FreeTextEntry6] : The port location was identified and marked on the skin with the aid of the implant magnet. The skin was prepped in sterile fashion. Sterile saline was injected. The skin flaps remained well perfused, pink with capillary refill <2 sec. The patient tolerated the procedure.\par \par Left TE pre-expansion volume: 850 ml\par Total volume right TE after after expansion: 900 ml\par  [FreeTextEntry2] : TE fill

## 2020-05-07 NOTE — ASSESSMENT
[FreeTextEntry1] : 42 y/o F with hx of left breast cancer s/p mastectomy, no XRT/CTX.\par Now POD # 57 s/p delayed left breast reconstruction with TE/AlloDerm in the process of expansion.  \par Tolerated Left TE overfill in office today, 50 cc today to final total 900cc\par \par -Regarding implant exchange, the benefits, risks, and outcomes of implant surgery discussed.  The risks included but are not limited to bleeding, infection, seroma, hematoma, paraesthesia, poor wound healing, implant failure (infection, extrusion, rupture), implant malposition, capsular contracture, asymmetry, small risk of breast-implant associated lymphoma, possible need for additional planned or unplanned surgery including revision and/or autologous tissue reconstruction (e.g. pedicled latissimus dorsi flap, TDAP flap, etc), and dissatisfaction with outcome.  We also discussed the likelihood of additional surgery on the breast over the course of her lifetime.\par -Reviewed implant options (saline vs silicone), the benefits, risks and alternatives.  Pt selected silicone options.\par -All questions were answered to the patient's apparent satisfaction, and informed consent was obtained.\par -C/w ambulate\par -c/w scar creams\par -Loose sports bra at all times\par -Pt knows to call for fevers, chills, redness, swelling\par -Will schedule left tissue expander exchange to silicone implant in 6 weeks.\par -F/u 1 week for serial TE overfill\par \par Pre-op photos (at 850 ml- prior to overfill) taken

## 2020-05-07 NOTE — HISTORY OF PRESENT ILLNESS
[FreeTextEntry1] : 43 y/o F, G0, with PMH of left breast CA s/p left mastectomy and SLNBx with Dr. Mims ?2016, and HTN, who presents for breast reconstruction consultation. Pt first found the lesion on initial screening mammogram; has not done any mammograms prior. Now up to date with annual mammograms and US. Last mammogram April 2019- normal per pt (done at [facility]). No CTX or XRT. On Tamoxifen x5 years. Reports chronic skin irritation to bilateral IMFs but denies neck/back or shoulder pain. Had a right breast I&D vs lumpectomy done- ?abscess; pt reports "bacteria", no malignancy. \par \par Ht 5'5" Wt 190lb; has been losing weight with diet\par Current bra size: unknown - happy with current size\par +FHx: sister- DCIS, dx at age 48; cousin, dx at age 40\par Oncologist: Landon\par Denies hx of VTE or MRSA infections\par Occ: [employer]\par \par Interval hx (11/22/19): Pt presents for f/u.  She has lost weight and her clothes fit better per pt.  She now weight in the 180s (lbs).  Pt feels ready to proceed with left breast reconstruction with TE/Alloderm.  Father is present for the discussion.  Pt weighed today; 187 lbs.\par \par Interval hx (3/11/20): Pt presents for first postoperative visit- POD #8 s/p delayed left breast reconstruction with TE/alloderm. Denies f/c, pain, CP/SOB, or calf pain. Completed PO abx. Drain output: 38/34/60.\par \par Interval hx (3/18/20):  Here for 2nd post-op visit POD#15 s/p delayed left breast reconstruction with TE/alldoerm. Drains 38/32/10.  Denies fevers, chills, redness, swelling.  complaint with lifting restrictions\par \par Interval hx (3/23/20): Here for serial TE fill.  Pt tolerated last fill without issue.   Denies fevers, chills, redness, swelling. c/w lifting restrictions\par \par Interval hx (4/1/20). Patient presents today POD#29 for f/u and serial TE fill. Doing well and reporting no new complaints. Tolerating expansion without any issues. \par \par Interval hx (4/8/20): Pt presents for POD #36 visit and serial TE fill. Reports last fill over 80cc was slightly more painful and less well tolerated as compared to 60cc. Otherwise feeling well.\par \par Interval hx (4/15/20): Pt presents for POD #43 visit and serial TE fill. Feeling well and tolerated last fill.\par \par Interval hx (4/22/20): Pt presents for POD #50 visit and serial TE fill. Feeling well and tolerated last fill.\par \par Interval hx (4/29/20): Pt presents for POD #57 visit and serial TE fill. Feeling well and tolerated last fill.\par \par Interval hx (5/7/20): Pt presents for POD #64 visit and serial TE fill. Feeling well and tolerated last fill.

## 2020-07-01 ENCOUNTER — OUTPATIENT (OUTPATIENT)
Dept: OUTPATIENT SERVICES | Facility: HOSPITAL | Age: 45
LOS: 1 days | Discharge: HOME | End: 2020-07-01
Payer: COMMERCIAL

## 2020-07-01 ENCOUNTER — RESULT REVIEW (OUTPATIENT)
Age: 45
End: 2020-07-01

## 2020-07-01 VITALS
TEMPERATURE: 99 F | HEART RATE: 82 BPM | HEIGHT: 65 IN | DIASTOLIC BLOOD PRESSURE: 79 MMHG | OXYGEN SATURATION: 100 % | WEIGHT: 216.71 LBS | RESPIRATION RATE: 18 BRPM | SYSTOLIC BLOOD PRESSURE: 141 MMHG

## 2020-07-01 DIAGNOSIS — Z01.818 ENCOUNTER FOR OTHER PREPROCEDURAL EXAMINATION: ICD-10-CM

## 2020-07-01 DIAGNOSIS — C50.912 MALIGNANT NEOPLASM OF UNSPECIFIED SITE OF LEFT FEMALE BREAST: ICD-10-CM

## 2020-07-01 DIAGNOSIS — Z98.890 OTHER SPECIFIED POSTPROCEDURAL STATES: Chronic | ICD-10-CM

## 2020-07-01 DIAGNOSIS — Z90.12 ACQUIRED ABSENCE OF LEFT BREAST AND NIPPLE: Chronic | ICD-10-CM

## 2020-07-01 LAB
ALBUMIN SERPL ELPH-MCNC: 4.6 G/DL — SIGNIFICANT CHANGE UP (ref 3.5–5.2)
ALP SERPL-CCNC: 86 U/L — SIGNIFICANT CHANGE UP (ref 30–115)
ALT FLD-CCNC: 23 U/L — SIGNIFICANT CHANGE UP (ref 0–41)
ANION GAP SERPL CALC-SCNC: 15 MMOL/L — HIGH (ref 7–14)
APPEARANCE UR: CLEAR — SIGNIFICANT CHANGE UP
APTT BLD: 36.8 SEC — SIGNIFICANT CHANGE UP (ref 27–39.2)
AST SERPL-CCNC: 17 U/L — SIGNIFICANT CHANGE UP (ref 0–41)
BACTERIA # UR AUTO: NEGATIVE — SIGNIFICANT CHANGE UP
BASOPHILS # BLD AUTO: 0.03 K/UL — SIGNIFICANT CHANGE UP (ref 0–0.2)
BASOPHILS NFR BLD AUTO: 0.4 % — SIGNIFICANT CHANGE UP (ref 0–1)
BILIRUB SERPL-MCNC: 0.2 MG/DL — SIGNIFICANT CHANGE UP (ref 0.2–1.2)
BILIRUB UR-MCNC: NEGATIVE — SIGNIFICANT CHANGE UP
BUN SERPL-MCNC: 17 MG/DL — SIGNIFICANT CHANGE UP (ref 10–20)
CALCIUM SERPL-MCNC: 10.7 MG/DL — HIGH (ref 8.5–10.1)
CHLORIDE SERPL-SCNC: 102 MMOL/L — SIGNIFICANT CHANGE UP (ref 98–110)
CO2 SERPL-SCNC: 28 MMOL/L — SIGNIFICANT CHANGE UP (ref 17–32)
COLOR SPEC: SIGNIFICANT CHANGE UP
CREAT SERPL-MCNC: 0.7 MG/DL — SIGNIFICANT CHANGE UP (ref 0.7–1.5)
DIFF PNL FLD: NEGATIVE — SIGNIFICANT CHANGE UP
EOSINOPHIL # BLD AUTO: 0.06 K/UL — SIGNIFICANT CHANGE UP (ref 0–0.7)
EOSINOPHIL NFR BLD AUTO: 0.9 % — SIGNIFICANT CHANGE UP (ref 0–8)
EPI CELLS # UR: 4 /HPF — SIGNIFICANT CHANGE UP (ref 0–5)
GLUCOSE SERPL-MCNC: 95 MG/DL — SIGNIFICANT CHANGE UP (ref 70–99)
GLUCOSE UR QL: NEGATIVE — SIGNIFICANT CHANGE UP
HCT VFR BLD CALC: 47.9 % — HIGH (ref 37–47)
HGB BLD-MCNC: 15.7 G/DL — SIGNIFICANT CHANGE UP (ref 12–16)
HYALINE CASTS # UR AUTO: 3 /LPF — SIGNIFICANT CHANGE UP (ref 0–7)
IMM GRANULOCYTES NFR BLD AUTO: 0.4 % — HIGH (ref 0.1–0.3)
INR BLD: 0.98 RATIO — SIGNIFICANT CHANGE UP (ref 0.65–1.3)
KETONES UR-MCNC: NEGATIVE — SIGNIFICANT CHANGE UP
LEUKOCYTE ESTERASE UR-ACNC: ABNORMAL
LYMPHOCYTES # BLD AUTO: 1.47 K/UL — SIGNIFICANT CHANGE UP (ref 1.2–3.4)
LYMPHOCYTES # BLD AUTO: 21.5 % — SIGNIFICANT CHANGE UP (ref 20.5–51.1)
MCHC RBC-ENTMCNC: 28.4 PG — SIGNIFICANT CHANGE UP (ref 27–31)
MCHC RBC-ENTMCNC: 32.8 G/DL — SIGNIFICANT CHANGE UP (ref 32–37)
MCV RBC AUTO: 86.6 FL — SIGNIFICANT CHANGE UP (ref 81–99)
MONOCYTES # BLD AUTO: 0.48 K/UL — SIGNIFICANT CHANGE UP (ref 0.1–0.6)
MONOCYTES NFR BLD AUTO: 7 % — SIGNIFICANT CHANGE UP (ref 1.7–9.3)
NEUTROPHILS # BLD AUTO: 4.76 K/UL — SIGNIFICANT CHANGE UP (ref 1.4–6.5)
NEUTROPHILS NFR BLD AUTO: 69.8 % — SIGNIFICANT CHANGE UP (ref 42.2–75.2)
NITRITE UR-MCNC: NEGATIVE — SIGNIFICANT CHANGE UP
NRBC # BLD: 0 /100 WBCS — SIGNIFICANT CHANGE UP (ref 0–0)
PH UR: 5.5 — SIGNIFICANT CHANGE UP (ref 5–8)
PLATELET # BLD AUTO: 251 K/UL — SIGNIFICANT CHANGE UP (ref 130–400)
POTASSIUM SERPL-MCNC: 3.8 MMOL/L — SIGNIFICANT CHANGE UP (ref 3.5–5)
POTASSIUM SERPL-SCNC: 3.8 MMOL/L — SIGNIFICANT CHANGE UP (ref 3.5–5)
PROT SERPL-MCNC: 7.1 G/DL — SIGNIFICANT CHANGE UP (ref 6–8)
PROT UR-MCNC: NEGATIVE — SIGNIFICANT CHANGE UP
PROTHROM AB SERPL-ACNC: 11.3 SEC — SIGNIFICANT CHANGE UP (ref 9.95–12.87)
RBC # BLD: 5.53 M/UL — HIGH (ref 4.2–5.4)
RBC # FLD: 12.4 % — SIGNIFICANT CHANGE UP (ref 11.5–14.5)
RBC CASTS # UR COMP ASSIST: 1 /HPF — SIGNIFICANT CHANGE UP (ref 0–4)
SODIUM SERPL-SCNC: 145 MMOL/L — SIGNIFICANT CHANGE UP (ref 135–146)
SP GR SPEC: 1.02 — SIGNIFICANT CHANGE UP (ref 1.01–1.02)
UROBILINOGEN FLD QL: SIGNIFICANT CHANGE UP
WBC # BLD: 6.83 K/UL — SIGNIFICANT CHANGE UP (ref 4.8–10.8)
WBC # FLD AUTO: 6.83 K/UL — SIGNIFICANT CHANGE UP (ref 4.8–10.8)
WBC UR QL: 5 /HPF — SIGNIFICANT CHANGE UP (ref 0–5)

## 2020-07-01 PROCEDURE — 93010 ELECTROCARDIOGRAM REPORT: CPT

## 2020-07-01 PROCEDURE — 71046 X-RAY EXAM CHEST 2 VIEWS: CPT | Mod: 26

## 2020-07-01 NOTE — H&P PST ADULT - HISTORY OF PRESENT ILLNESS
0/4/29/2020 Surgery Note: 45 y/o F, G0, with PMH of left breast CA s/p left mastectomy and SLNBx with Dr. Mims ?2016, and HTN, who presents for breast reconstruction consultation. Pt first found the lesion on initial screening mammogram; has not done any mammograms prior. Now up to date with annual mammograms and US. Last mammogram April 2019- normal per pt (done at [facility]). No CTX or XRT. On Tamoxifen x5 years. Reports chronic skin irritation to bilateral IMFs but denies neck/back or shoulder pain. Had a right breast I&D vs lumpectomy done- ?abscess; pt reports "bacteria", no malignancy.   07/1/2020 PAST NP Note: 0/4/29/2020 Surgery Note: 43 y/o F, G0, with PMH of left breast CA s/p left mastectomy and SLNBx with Dr. Mims ?2016, and HTN, who presents for breast reconstruction consultation. Pt first found the lesion on initial screening mammogram; has not done any mammograms prior. Now up to date with annual mammograms and US. Last mammogram April 2019- normal per pt (done at [facility]). No CTX or XRT. On Tamoxifen x5 years. Reports chronic skin irritation to bilateral IMFs but denies neck/back or shoulder pain. Had a right breast I&D vs lumpectomy done- ?abscess; pt reports "bacteria", no malignancy.   07/1/2020 PAST NP Note: Patient present to PAST for the above procedure. Denies any cp, sob, palpitations, fever, cough or dysuria. No s/s or exposed to covid. Ex tolerance of 2 fos walk with out SOB. No PRAVEEN.

## 2020-07-01 NOTE — H&P PST ADULT - NSICDXPASTMEDICALHX_GEN_ALL_CORE_FT
PAST MEDICAL HISTORY:  Arthritis     Breast cancer left , had surgery, then tamoxifen    Fibroids     Obese

## 2020-07-01 NOTE — H&P PST ADULT - REASON FOR ADMISSION
45 yo F presents to PAST for left breast tissue expander exchange to silicone implant under GA on 07/8/2020 at [facility] by [address]

## 2020-07-01 NOTE — H&P PST ADULT - NSICDXFAMILYHX_GEN_ALL_CORE_FT
FAMILY HISTORY:  Father  Still living? Yes, Estimated age: 61-70  Family history of prostate cancer in father, Age at diagnosis: 61-70

## 2020-07-06 ENCOUNTER — LABORATORY RESULT (OUTPATIENT)
Age: 45
End: 2020-07-06

## 2020-07-06 ENCOUNTER — OUTPATIENT (OUTPATIENT)
Dept: OUTPATIENT SERVICES | Facility: HOSPITAL | Age: 45
LOS: 1 days | Discharge: HOME | End: 2020-07-06

## 2020-07-06 DIAGNOSIS — Z98.890 OTHER SPECIFIED POSTPROCEDURAL STATES: Chronic | ICD-10-CM

## 2020-07-06 DIAGNOSIS — Z90.12 ACQUIRED ABSENCE OF LEFT BREAST AND NIPPLE: Chronic | ICD-10-CM

## 2020-07-06 DIAGNOSIS — Z11.59 ENCOUNTER FOR SCREENING FOR OTHER VIRAL DISEASES: ICD-10-CM

## 2020-07-08 ENCOUNTER — APPOINTMENT (OUTPATIENT)
Dept: PLASTIC SURGERY | Facility: HOSPITAL | Age: 45
End: 2020-07-08
Payer: COMMERCIAL

## 2020-07-08 ENCOUNTER — OUTPATIENT (OUTPATIENT)
Dept: OUTPATIENT SERVICES | Facility: HOSPITAL | Age: 45
LOS: 1 days | Discharge: HOME | End: 2020-07-08
Payer: COMMERCIAL

## 2020-07-08 ENCOUNTER — RESULT REVIEW (OUTPATIENT)
Age: 45
End: 2020-07-08

## 2020-07-08 VITALS
HEART RATE: 82 BPM | DIASTOLIC BLOOD PRESSURE: 90 MMHG | RESPIRATION RATE: 24 BRPM | TEMPERATURE: 99 F | SYSTOLIC BLOOD PRESSURE: 174 MMHG | OXYGEN SATURATION: 97 %

## 2020-07-08 VITALS
WEIGHT: 190.04 LBS | HEART RATE: 76 BPM | HEIGHT: 65 IN | DIASTOLIC BLOOD PRESSURE: 72 MMHG | TEMPERATURE: 98 F | SYSTOLIC BLOOD PRESSURE: 146 MMHG | RESPIRATION RATE: 250 BRPM

## 2020-07-08 DIAGNOSIS — Z98.890 OTHER SPECIFIED POSTPROCEDURAL STATES: Chronic | ICD-10-CM

## 2020-07-08 DIAGNOSIS — Z90.12 ACQUIRED ABSENCE OF LEFT BREAST AND NIPPLE: Chronic | ICD-10-CM

## 2020-07-08 PROCEDURE — 88300 SURGICAL PATH GROSS: CPT | Mod: 26,59

## 2020-07-08 PROCEDURE — 11970 RPLCMT TISS XPNDR PERM IMPLT: CPT | Mod: LT

## 2020-07-08 PROCEDURE — 14000 TIS TRNFR TRUNK 10 SQ CM/<: CPT

## 2020-07-08 PROCEDURE — 88307 TISSUE EXAM BY PATHOLOGIST: CPT | Mod: 26

## 2020-07-08 PROCEDURE — 88304 TISSUE EXAM BY PATHOLOGIST: CPT | Mod: 26

## 2020-07-08 RX ORDER — ASPIRIN/CALCIUM CARB/MAGNESIUM 324 MG
1 TABLET ORAL
Qty: 0 | Refills: 0 | DISCHARGE

## 2020-07-08 RX ORDER — GABAPENTIN 400 MG/1
1 CAPSULE ORAL
Qty: 21 | Refills: 0
Start: 2020-07-08 | End: 2020-07-14

## 2020-07-08 RX ORDER — ACETAMINOPHEN 500 MG
650 TABLET ORAL ONCE
Refills: 0 | Status: COMPLETED | OUTPATIENT
Start: 2020-07-08 | End: 2020-07-08

## 2020-07-08 RX ORDER — SODIUM CHLORIDE 9 MG/ML
1000 INJECTION, SOLUTION INTRAVENOUS
Refills: 0 | Status: DISCONTINUED | OUTPATIENT
Start: 2020-07-08 | End: 2020-07-08

## 2020-07-08 RX ORDER — HYDROMORPHONE HYDROCHLORIDE 2 MG/ML
0.5 INJECTION INTRAMUSCULAR; INTRAVENOUS; SUBCUTANEOUS
Refills: 0 | Status: DISCONTINUED | OUTPATIENT
Start: 2020-07-08 | End: 2020-07-08

## 2020-07-08 RX ORDER — ONDANSETRON 8 MG/1
4 TABLET, FILM COATED ORAL ONCE
Refills: 0 | Status: DISCONTINUED | OUTPATIENT
Start: 2020-07-08 | End: 2020-07-08

## 2020-07-08 RX ORDER — TAMOXIFEN CITRATE 20 MG/1
1 TABLET, FILM COATED ORAL
Qty: 0 | Refills: 0 | DISCHARGE

## 2020-07-08 RX ORDER — TRAMADOL HYDROCHLORIDE 50 MG/1
1 TABLET ORAL
Qty: 10 | Refills: 0
Start: 2020-07-08

## 2020-07-08 RX ADMIN — Medication 650 MILLIGRAM(S): at 14:45

## 2020-07-08 RX ADMIN — SODIUM CHLORIDE 100 MILLILITER(S): 9 INJECTION, SOLUTION INTRAVENOUS at 12:48

## 2020-07-08 RX ADMIN — HYDROMORPHONE HYDROCHLORIDE 0.5 MILLIGRAM(S): 2 INJECTION INTRAMUSCULAR; INTRAVENOUS; SUBCUTANEOUS at 13:46

## 2020-07-08 RX ADMIN — HYDROMORPHONE HYDROCHLORIDE 0.5 MILLIGRAM(S): 2 INJECTION INTRAMUSCULAR; INTRAVENOUS; SUBCUTANEOUS at 13:25

## 2020-07-08 NOTE — ASU DISCHARGE PLAN (ADULT/PEDIATRIC) - ASU DC SPECIAL INSTRUCTIONSFT
-Take antibiotics as prescribed (Doxycycline).  -Take Tylenol 650 mg every 6 hours and Gabapentin 100mg every 8 hours for pain. If not well controlled, take Tramadol as needed.  -No heavy lifting or strenuous activity.  -Walking is encouraged.  -Call the office with any questions or concerns: [phone].  -Follow up in 1 week.

## 2020-07-08 NOTE — BRIEF OPERATIVE NOTE - NSICDXBRIEFPROCEDURE_GEN_ALL_CORE_FT
PROCEDURES:  Removal, tissue expander 08-Jul-2020 12:52:20 Placement of permanent silicone implant, Left Linnea Henriquez PROCEDURES:  Adjacent tissue transfer of trunk, 10.1 to 30 sq cm 08-Jul-2020 13:02:32  Ana Rosa Christianson, tissue expander 08-Jul-2020 12:52:20 Placement of permanent silicone implant, Left Linnea Henriquez

## 2020-07-08 NOTE — BRIEF OPERATIVE NOTE - NSICDXBRIEFPOSTOP_GEN_ALL_CORE_FT
POST-OP DIAGNOSIS:  Acquired absence of breast and nipple, left 08-Jul-2020 12:54:33  Linnea Henriquez  History of left breast cancer 08-Jul-2020 12:54:47  Linnea Henriquez POST-OP DIAGNOSIS:  History of left breast cancer 08-Jul-2020 12:54:47  Linnea Henriquez  Acquired absence of breast and nipple, left 08-Jul-2020 12:54:33  Linnea Henriquez

## 2020-07-08 NOTE — PRE-ANESTHESIA EVALUATION ADULT - NSANTHADDINFOFT_GEN_ALL_CORE
procedure/Risks explained for GA with LMA v. ETT + routine monitoring. Patient understands stated plan and agrees to proceed.

## 2020-07-08 NOTE — BRIEF OPERATIVE NOTE - OPERATION/FINDINGS
Left breast expander tissue removed, capsulotomy performed, permanent silicone implant placed 800 cc  Left axillary roll excised

## 2020-07-08 NOTE — BRIEF OPERATIVE NOTE - NSICDXBRIEFPREOP_GEN_ALL_CORE_FT
PRE-OP DIAGNOSIS:  Acquired absence of breast and nipple, left 08-Jul-2020 12:54:23  Linnea Henriquez  History of left breast cancer 08-Jul-2020 12:53:03  Linnea Henriquez

## 2020-07-13 LAB — SURGICAL PATHOLOGY STUDY: SIGNIFICANT CHANGE UP

## 2020-07-14 DIAGNOSIS — Z42.1 ENCOUNTER FOR BREAST RECONSTRUCTION FOLLOWING MASTECTOMY: ICD-10-CM

## 2020-07-14 DIAGNOSIS — M19.90 UNSPECIFIED OSTEOARTHRITIS, UNSPECIFIED SITE: ICD-10-CM

## 2020-07-14 DIAGNOSIS — Z85.3 PERSONAL HISTORY OF MALIGNANT NEOPLASM OF BREAST: ICD-10-CM

## 2020-07-14 DIAGNOSIS — E66.9 OBESITY, UNSPECIFIED: ICD-10-CM

## 2020-07-14 DIAGNOSIS — L90.5 SCAR CONDITIONS AND FIBROSIS OF SKIN: ICD-10-CM

## 2020-07-14 DIAGNOSIS — Z79.82 LONG TERM (CURRENT) USE OF ASPIRIN: ICD-10-CM

## 2020-07-14 DIAGNOSIS — Z88.8 ALLERGY STATUS TO OTHER DRUGS, MEDICAMENTS AND BIOLOGICAL SUBSTANCES STATUS: ICD-10-CM

## 2020-07-14 DIAGNOSIS — Z79.810 LONG TERM (CURRENT) USE OF SELECTIVE ESTROGEN RECEPTOR MODULATORS (SERMS): ICD-10-CM

## 2020-07-15 ENCOUNTER — APPOINTMENT (OUTPATIENT)
Dept: PLASTIC SURGERY | Facility: CLINIC | Age: 45
End: 2020-07-15
Payer: COMMERCIAL

## 2020-07-15 PROCEDURE — 99024 POSTOP FOLLOW-UP VISIT: CPT

## 2020-07-15 NOTE — ASSESSMENT
[FreeTextEntry1] : 42 y/o F with hx of left breast cancer s/p mastectomy, no XRT/CTX.\par s/p delayed left breast reconstruction with TE/AlloDerm in the process of expansion 3/3/20\par \par Now POD #7 s/p left TE exchange to permanent silicone implant and excision of left axillary roll, doing well\par \par -Bandages changed\par -May shower\par -Tylenol PRN\par -C/w ambulation\par -Continue surgical bra\par -Pt knows to call for fevers, chills, redness, swelling\par -F/u 1 week for suture removal\par -Will schedule left tissue expander exchange to silicone implant in 6 weeks.\par -F/u 1 week for serial TE overfill\par \par Pre-op photos (at 850 ml- prior to overfill) taken

## 2020-07-15 NOTE — HISTORY OF PRESENT ILLNESS
[FreeTextEntry1] : 45 y/o F, G0, with PMH of left breast CA s/p left mastectomy and SLNBx with Dr. Mims ?2016, and HTN, who presents for breast reconstruction consultation. Pt first found the lesion on initial screening mammogram; has not done any mammograms prior. Now up to date with annual mammograms and US. Last mammogram April 2019- normal per pt (done at [facility]). No CTX or XRT. On Tamoxifen x5 years. Reports chronic skin irritation to bilateral IMFs but denies neck/back or shoulder pain. Had a right breast I&D vs lumpectomy done- ?abscess; pt reports "bacteria", no malignancy. \par \par Ht 5'5" Wt 190lb; has been losing weight with diet\par Current bra size: unknown - happy with current size\par +FHx: sister- DCIS, dx at age 48; cousin, dx at age 40\par Oncologist: Lnadon\par Denies hx of VTE or MRSA infections\par Occ: [employer]\par \par Interval hx (11/22/19): Pt presents for f/u.  She has lost weight and her clothes fit better per pt.  She now weight in the 180s (lbs).  Pt feels ready to proceed with left breast reconstruction with TE/Alloderm.  Father is present for the discussion.  Pt weighed today; 187 lbs.\par \par Interval hx (3/11/20): Pt presents for first postoperative visit- POD #8 s/p delayed left breast reconstruction with TE/alloderm. Denies f/c, pain, CP/SOB, or calf pain. Completed PO abx. Drain output: 38/34/60.\par \par Interval hx (3/18/20):  Here for 2nd post-op visit POD#15 s/p delayed left breast reconstruction with TE/alldoerm. Drains 38/32/10.  Denies fevers, chills, redness, swelling.  complaint with lifting restrictions\par \par Interval hx (3/23/20): Here for serial TE fill.  Pt tolerated last fill without issue.   Denies fevers, chills, redness, swelling. c/w lifting restrictions\par \par Interval hx (4/1/20). Patient presents today POD#29 for f/u and serial TE fill. Doing well and reporting no new complaints. Tolerating expansion without any issues. \par \par Interval hx (4/8/20): Pt presents for POD #36 visit and serial TE fill. Reports last fill over 80cc was slightly more painful and less well tolerated as compared to 60cc. Otherwise feeling well.\par \par Interval hx (4/15/20): Pt presents for POD #43 visit and serial TE fill. Feeling well and tolerated last fill.\par \par Interval hx (4/22/20): Pt presents for POD #50 visit and serial TE fill. Feeling well and tolerated last fill.\par \par Interval hx (4/29/20): Pt presents for POD #57 visit and serial TE fill. Feeling well and tolerated last fill.\par \par Interval hx (5/7/20): Pt presents for POD #64 visit and serial TE fill. Feeling well and tolerated last fill.\par \par Interval hx (7/15/20): Here for f/u POD #7 s/p left TE exchange to permanent silicone implant and excision of left axillary roll. Denies pain, f/c, or drainage. Happy with results. Feels left breast is still slightly smaller than right.

## 2020-07-15 NOTE — PHYSICAL EXAM
[de-identified] : well-appearing, NAD [de-identified] : Right breast: large pendulous breast with no palpable masses, nipple retraction or discharge. Well healed superior periareolar scar with minor contour depression at 9 o'clock\par Left breast: implant soft and mobile, incisions along central breast and axillary roll healing well, no s/s cellulitis

## 2020-07-21 ENCOUNTER — APPOINTMENT (OUTPATIENT)
Dept: PLASTIC SURGERY | Facility: CLINIC | Age: 45
End: 2020-07-21
Payer: COMMERCIAL

## 2020-07-21 PROCEDURE — 99024 POSTOP FOLLOW-UP VISIT: CPT

## 2020-07-21 NOTE — HISTORY OF PRESENT ILLNESS
[FreeTextEntry1] : 45 y/o F, G0, with PMH of left breast CA s/p left mastectomy and SLNBx with Dr. Mims ?2016, and HTN, who presents for breast reconstruction consultation. Pt first found the lesion on initial screening mammogram; has not done any mammograms prior. Now up to date with annual mammograms and US. Last mammogram April 2019- normal per pt (done at [facility]). No CTX or XRT. On Tamoxifen x5 years. Reports chronic skin irritation to bilateral IMFs but denies neck/back or shoulder pain. Had a right breast I&D vs lumpectomy done- ?abscess; pt reports "bacteria", no malignancy. \par \par Ht 5'5" Wt 190lb; has been losing weight with diet\par Current bra size: unknown - happy with current size\par +FHx: sister- DCIS, dx at age 48; cousin, dx at age 40\par Oncologist: Landon\par Denies hx of VTE or MRSA infections\par Occ: [employer]\par \par Interval hx (11/22/19): Pt presents for f/u.  She has lost weight and her clothes fit better per pt.  She now weight in the 180s (lbs).  Pt feels ready to proceed with left breast reconstruction with TE/Alloderm.  Father is present for the discussion.  Pt weighed today; 187 lbs.\par \par Interval hx (3/11/20): Pt presents for first postoperative visit- POD #8 s/p delayed left breast reconstruction with TE/alloderm. Denies f/c, pain, CP/SOB, or calf pain. Completed PO abx. Drain output: 38/34/60.\par \par Interval hx (3/18/20):  Here for 2nd post-op visit POD#15 s/p delayed left breast reconstruction with TE/alldoerm. Drains 38/32/10.  Denies fevers, chills, redness, swelling.  complaint with lifting restrictions\par \par Interval hx (3/23/20): Here for serial TE fill.  Pt tolerated last fill without issue.   Denies fevers, chills, redness, swelling. c/w lifting restrictions\par \par Interval hx (4/1/20). Patient presents today POD#29 for f/u and serial TE fill. Doing well and reporting no new complaints. Tolerating expansion without any issues. \par \par Interval hx (4/8/20): Pt presents for POD #36 visit and serial TE fill. Reports last fill over 80cc was slightly more painful and less well tolerated as compared to 60cc. Otherwise feeling well.\par \par Interval hx (4/15/20): Pt presents for POD #43 visit and serial TE fill. Feeling well and tolerated last fill.\par \par Interval hx (4/22/20): Pt presents for POD #50 visit and serial TE fill. Feeling well and tolerated last fill.\par \par Interval hx (4/29/20): Pt presents for POD #57 visit and serial TE fill. Feeling well and tolerated last fill.\par \par Interval hx (5/7/20): Pt presents for POD #64 visit and serial TE fill. Feeling well and tolerated last fill.\par \par Interval hx (7/15/20): Here for f/u POD #7 s/p left TE exchange to permanent silicone implant and excision of left axillary roll. Denies pain, f/c, or drainage. Happy with results. Feels left breast is still slightly smaller than right.\par \par Interval hx (7/21/20): Here for f/u POD #13 s/p left TE exchange to permanent silicone implant and excision of left axillary roll

## 2020-07-21 NOTE — PHYSICAL EXAM
[de-identified] : well-appearing, NAD [de-identified] : Right breast: large pendulous breast with no palpable masses, nipple retraction or discharge. Well healed superior periareolar scar with minor contour depression at 9 o'clock\par Left breast: implant soft and mobile, incisions along central breast and axillary roll healing well, no s/s cellulitis

## 2020-08-21 ENCOUNTER — APPOINTMENT (OUTPATIENT)
Dept: PLASTIC SURGERY | Facility: CLINIC | Age: 45
End: 2020-08-21
Payer: COMMERCIAL

## 2020-08-21 DIAGNOSIS — Z90.12 ACQUIRED ABSENCE OF LEFT BREAST AND NIPPLE: ICD-10-CM

## 2020-08-21 PROCEDURE — 99024 POSTOP FOLLOW-UP VISIT: CPT

## 2020-08-21 NOTE — HISTORY OF PRESENT ILLNESS
[FreeTextEntry1] : 45 y/o F, G0, with PMH of left breast CA s/p left mastectomy and SLNBx with Dr. Mims ?2016, and HTN, who presents for breast reconstruction consultation. Pt first found the lesion on initial screening mammogram; has not done any mammograms prior. Now up to date with annual mammograms and US. Last mammogram April 2019- normal per pt (done at [facility]). No CTX or XRT. On Tamoxifen x5 years. Reports chronic skin irritation to bilateral IMFs but denies neck/back or shoulder pain. Had a right breast I&D vs lumpectomy done- ?abscess; pt reports "bacteria", no malignancy. \par \par Ht 5'5" Wt 190lb; has been losing weight with diet\par Current bra size: unknown - happy with current size\par +FHx: sister- DCIS, dx at age 48; cousin, dx at age 40\par Oncologist: Landon\par Denies hx of VTE or MRSA infections\par Occ: [employer]\par \par Interval hx (11/22/19): Pt presents for f/u.  She has lost weight and her clothes fit better per pt.  She now weight in the 180s (lbs).  Pt feels ready to proceed with left breast reconstruction with TE/Alloderm.  Father is present for the discussion.  Pt weighed today; 187 lbs.\par \par Interval hx (3/11/20): Pt presents for first postoperative visit- POD #8 s/p delayed left breast reconstruction with TE/alloderm. Denies f/c, pain, CP/SOB, or calf pain. Completed PO abx. Drain output: 38/34/60.\par \par Interval hx (3/18/20):  Here for 2nd post-op visit POD#15 s/p delayed left breast reconstruction with TE/alldoerm. Drains 38/32/10.  Denies fevers, chills, redness, swelling.  complaint with lifting restrictions\par \par Interval hx (3/23/20): Here for serial TE fill.  Pt tolerated last fill without issue.   Denies fevers, chills, redness, swelling. c/w lifting restrictions\par \par Interval hx (4/1/20). Patient presents today POD#29 for f/u and serial TE fill. Doing well and reporting no new complaints. Tolerating expansion without any issues. \par \par Interval hx (4/8/20): Pt presents for POD #36 visit and serial TE fill. Reports last fill over 80cc was slightly more painful and less well tolerated as compared to 60cc. Otherwise feeling well.\par \par Interval hx (4/15/20): Pt presents for POD #43 visit and serial TE fill. Feeling well and tolerated last fill.\par \par Interval hx (4/22/20): Pt presents for POD #50 visit and serial TE fill. Feeling well and tolerated last fill.\par \par Interval hx (4/29/20): Pt presents for POD #57 visit and serial TE fill. Feeling well and tolerated last fill.\par \par Interval hx (5/7/20): Pt presents for POD #64 visit and serial TE fill. Feeling well and tolerated last fill.\par \par Interval hx (7/15/20): Here for f/u POD #7 s/p left TE exchange to permanent silicone implant and excision of left axillary roll. Denies pain, f/c, or drainage. Happy with results. Feels left breast is still slightly smaller than right.\par \par Interval hx (7/21/20): Here for f/u POD #13 s/p left TE exchange to permanent silicone implant and excision of left axillary roll\par \par Interval hx (8/21/20)  6 weeks s/p left TE exchange to silicone implant.  No complaints.  She is to return to work 8/27 and needs work release.

## 2020-08-21 NOTE — ASSESSMENT
[FreeTextEntry1] : 44 y/o F with hx of left breast cancer s/p mastectomy, no XRT/CTX.\par s/p delayed left breast reconstruction with TE/AlloDerm in the process of expansion 3/3/20\par \par 6 weeks s/p left TE exchange to permanent silicone implant and excision of left axillary roll, doing well\par Pt happy\par \par -May regular bra; Rx for left breast bra prosthesis given.  Order after 6 weeks\par -Tylenol PRN\par -C/w ambulation\par -REsume all physical activity.\par -c/w scar cream daily\par -Offer right breast symmetry procedure; pt declined at this time.\par -Pt knows to call for fevers, chills, redness, swelling\par -F/u 6 weeks; photos\par \par Photos were taken

## 2020-08-21 NOTE — PHYSICAL EXAM
[de-identified] : Right breast: large pendulous breast with no palpable masses, nipple retraction or discharge. Well healed superior periareolar scar with minor contour depression at 9 o'clock\par Left breast: implant soft and mobile, incisions along central breast and axillary roll healing well, no s/s cellulitis [de-identified] : well-appearing, NAD

## 2020-10-02 ENCOUNTER — APPOINTMENT (OUTPATIENT)
Dept: PLASTIC SURGERY | Facility: CLINIC | Age: 45
End: 2020-10-02
Payer: COMMERCIAL

## 2020-10-02 PROCEDURE — 99024 POSTOP FOLLOW-UP VISIT: CPT

## 2020-10-02 NOTE — PHYSICAL EXAM
[de-identified] : well-appearing, NAD [de-identified] : Right breast: large pendulous breast with no palpable masses, nipple retraction or discharge. Well healed superior periareolar scar with minor contour depression at 9 o'clock\par Left breast: implant soft and mobile, incisions along central breast and axillary roll healing well with minor clothsline scar banding, no s/s cellulitis

## 2020-10-02 NOTE — HISTORY OF PRESENT ILLNESS
[FreeTextEntry1] : 45 y/o F, G0, with PMH of left breast CA s/p left mastectomy and SLNBx with Dr. Mims ?2016, and HTN, who presents for breast reconstruction consultation. Pt first found the lesion on initial screening mammogram; has not done any mammograms prior. Now up to date with annual mammograms and US. Last mammogram April 2019- normal per pt (done at [facility]). No CTX or XRT. On Tamoxifen x5 years. Reports chronic skin irritation to bilateral IMFs but denies neck/back or shoulder pain. Had a right breast I&D vs lumpectomy done- ?abscess; pt reports "bacteria", no malignancy. \par \par Ht 5'5" Wt 190lb; has been losing weight with diet\par Current bra size: unknown - happy with current size\par +FHx: sister- DCIS, dx at age 48; cousin, dx at age 40\par Oncologist: Landon\par Denies hx of VTE or MRSA infections\par Occ: [employer]\par \par Interval hx (11/22/19): Pt presents for f/u.  She has lost weight and her clothes fit better per pt.  She now weight in the 180s (lbs).  Pt feels ready to proceed with left breast reconstruction with TE/Alloderm.  Father is present for the discussion.  Pt weighed today; 187 lbs.\par \par Interval hx (3/11/20): Pt presents for first postoperative visit- POD #8 s/p delayed left breast reconstruction with TE/alloderm. Denies f/c, pain, CP/SOB, or calf pain. Completed PO abx. Drain output: 38/34/60.\par \par Interval hx (3/18/20):  Here for 2nd post-op visit POD#15 s/p delayed left breast reconstruction with TE/alldoerm. Drains 38/32/10.  Denies fevers, chills, redness, swelling.  complaint with lifting restrictions\par \par Interval hx (3/23/20): Here for serial TE fill.  Pt tolerated last fill without issue.   Denies fevers, chills, redness, swelling. c/w lifting restrictions\par \par Interval hx (4/1/20). Patient presents today POD#29 for f/u and serial TE fill. Doing well and reporting no new complaints. Tolerating expansion without any issues. \par \par Interval hx (4/8/20): Pt presents for POD #36 visit and serial TE fill. Reports last fill over 80cc was slightly more painful and less well tolerated as compared to 60cc. Otherwise feeling well.\par \par Interval hx (4/15/20): Pt presents for POD #43 visit and serial TE fill. Feeling well and tolerated last fill.\par \par Interval hx (4/22/20): Pt presents for POD #50 visit and serial TE fill. Feeling well and tolerated last fill.\par \par Interval hx (4/29/20): Pt presents for POD #57 visit and serial TE fill. Feeling well and tolerated last fill.\par \par Interval hx (5/7/20): Pt presents for POD #64 visit and serial TE fill. Feeling well and tolerated last fill.\par \par Interval hx (7/15/20): Here for f/u POD #7 s/p left TE exchange to permanent silicone implant and excision of left axillary roll. Denies pain, f/c, or drainage. Happy with results. Feels left breast is still slightly smaller than right.\par \par Interval hx (7/21/20): Here for f/u POD #13 s/p left TE exchange to permanent silicone implant and excision of left axillary roll\par \par Interval hx (8/21/20)  6 weeks s/p left TE exchange to silicone implant.  No complaints.  She is to return to work 8/27 and needs work release.\par \par Interval hx (10/2/20): 3 months s/p left TE exchange to silicone implant.  No complaints.  No issues with return to work.

## 2021-01-08 ENCOUNTER — APPOINTMENT (OUTPATIENT)
Dept: PLASTIC SURGERY | Facility: CLINIC | Age: 46
End: 2021-01-08
Payer: COMMERCIAL

## 2021-01-08 DIAGNOSIS — Z98.890 OTHER SPECIFIED POSTPROCEDURAL STATES: ICD-10-CM

## 2021-01-08 PROCEDURE — 99213 OFFICE O/P EST LOW 20 MIN: CPT

## 2021-01-08 PROCEDURE — 99072 ADDL SUPL MATRL&STAF TM PHE: CPT

## 2021-01-08 NOTE — PHYSICAL EXAM
[de-identified] : well-appearing, NAD [de-identified] : Right breast: large pendulous breast with no palpable masses, nipple retraction or discharge. Well healed superior periareolar scar with minor contour depression at 9 o'clock\par Left breast: implant soft and mobile, incisions along central breast and axillary roll healing well with resolved scar band

## 2021-01-08 NOTE — ASSESSMENT
[FreeTextEntry1] : 44 y/o F with hx of left breast cancer s/p mastectomy, no XRT/CTX.\par s/p delayed left breast reconstruction with TE/AlloDerm in the process of expansion 3/3/20\par \par 6 months s/p left TE exchange to permanent silicone implant and excision of left axillary roll, doing well\par Pt happy to reconstructive results\par \par -c/w daily moisturizer\par -Offered right breast symmetry procedure; pt declined at this time and will consider in the future\par -Pt knows to call for fevers, chills, redness, swelling\par -F/u 6 months\par \par Photos were taken\par \par Due to COVID-19, pre-visit patient instructions were explained to the patient and their symptoms were checked upon arrival. Masks were used by the healthcare provider and staff and the examination room was cleaned after the patient visit concluded

## 2021-01-08 NOTE — HISTORY OF PRESENT ILLNESS
[FreeTextEntry1] : 45 y/o F, G0, with PMH of left breast CA s/p left mastectomy and SLNBx with Dr. Mims ?2016, and HTN, who presents for breast reconstruction consultation. Pt first found the lesion on initial screening mammogram; has not done any mammograms prior. Now up to date with annual mammograms and US. Last mammogram April 2019- normal per pt (done at [facility]). No CTX or XRT. On Tamoxifen x5 years. Reports chronic skin irritation to bilateral IMFs but denies neck/back or shoulder pain. Had a right breast I&D vs lumpectomy done- ?abscess; pt reports "bacteria", no malignancy. \par \par Ht 5'5" Wt 190lb; has been losing weight with diet\par Current bra size: unknown - happy with current size\par +FHx: sister- DCIS, dx at age 48; cousin, dx at age 40\par Oncologist: Landon\par Denies hx of VTE or MRSA infections\par Occ: [employer]\par \par Interval hx (11/22/19): Pt presents for f/u.  She has lost weight and her clothes fit better per pt.  She now weight in the 180s (lbs).  Pt feels ready to proceed with left breast reconstruction with TE/Alloderm.  Father is present for the discussion.  Pt weighed today; 187 lbs.\par \par Interval hx (3/11/20): Pt presents for first postoperative visit- POD #8 s/p delayed left breast reconstruction with TE/alloderm. Denies f/c, pain, CP/SOB, or calf pain. Completed PO abx. Drain output: 38/34/60.\par \par Interval hx (3/18/20):  Here for 2nd post-op visit POD#15 s/p delayed left breast reconstruction with TE/alldoerm. Drains 38/32/10.  Denies fevers, chills, redness, swelling.  complaint with lifting restrictions\par \par Interval hx (3/23/20): Here for serial TE fill.  Pt tolerated last fill without issue.   Denies fevers, chills, redness, swelling. c/w lifting restrictions\par \par Interval hx (4/1/20). Patient presents today POD#29 for f/u and serial TE fill. Doing well and reporting no new complaints. Tolerating expansion without any issues. \par \par Interval hx (4/8/20): Pt presents for POD #36 visit and serial TE fill. Reports last fill over 80cc was slightly more painful and less well tolerated as compared to 60cc. Otherwise feeling well.\par \par Interval hx (4/15/20): Pt presents for POD #43 visit and serial TE fill. Feeling well and tolerated last fill.\par \par Interval hx (4/22/20): Pt presents for POD #50 visit and serial TE fill. Feeling well and tolerated last fill.\par \par Interval hx (4/29/20): Pt presents for POD #57 visit and serial TE fill. Feeling well and tolerated last fill.\par \par Interval hx (5/7/20): Pt presents for POD #64 visit and serial TE fill. Feeling well and tolerated last fill.\par \par Interval hx (7/15/20): Here for f/u POD #7 s/p left TE exchange to permanent silicone implant and excision of left axillary roll. Denies pain, f/c, or drainage. Happy with results. Feels left breast is still slightly smaller than right.\par \par Interval hx (7/21/20): Here for f/u POD #13 s/p left TE exchange to permanent silicone implant and excision of left axillary roll\par \par Interval hx (8/21/20)  6 weeks s/p left TE exchange to silicone implant.  No complaints.  She is to return to work 8/27 and needs work release.\par \par Interval hx (10/2/20): 3 months s/p left TE exchange to silicone implant.  No complaints.  No issues with return to work.\par \par Interval hx (10/2/20): 6 months s/p left TE exchange to silicone implant.  No complaints. Working without issues.  Pt very happy with left breast appearance.   Not interested in right breast reduction for symmetry.

## 2021-07-06 NOTE — CONSULT NOTE ADULT - SUBJECTIVE AND OBJECTIVE BOX
PRIOR AUTHORIZATION DENIED    Medication: tretinoin (RETIN-A) 0.025 % external cream - DENIED     Denial Date: 7/5/2021    Denial Rational:     Appeal Information:     HPI: 41 yo F admitted on 7/20 for hysterectomy for fibroids. prior to  hospitalization she was ambulating independent.      PAST MEDICAL & SURGICAL HISTORY:  Fibroids  Arthritis  Obese  Breast cancer: left , had surgery, then tamoxifen  History of surgery: fibroid embolization  H/O left mastectomy      Hospital Course:    TODAY'S SUBJECTIVE & REVIEW OF SYMPTOMS:     Constitutional WNL   Cardio WNL   Resp WNL   GI WNL  Heme WNL  Endo WNL  Skin WNL  MSK surgical site pain  Neuro WNL  Cognitive WNL  Psych WNL      MEDICATIONS  (STANDING):  docusate sodium 100 milliGRAM(s) Oral two times a day  pantoprazole  Injectable 40 milliGRAM(s) IV Push daily    MEDICATIONS  (PRN):  acetaminophen   Tablet 650 milliGRAM(s) Oral every 6 hours PRN For Temp greater than 38 C (100.4 F)  acetaminophen   Tablet. 650 milliGRAM(s) Oral every 6 hours PRN Mild Pain (1 - 3)  diphenhydrAMINE   Capsule 25 milliGRAM(s) Oral every 4 hours PRN Rash and/or Itching  ondansetron Injectable 4 milliGRAM(s) IV Push every 6 hours PRN Nausea  ondansetron Injectable 4 milliGRAM(s) IV Push every 6 hours PRN Nausea and/or Vomiting  oxyCODONE    5 mG/acetaminophen 325 mG 1 Tablet(s) Oral every 4 hours PRN Moderate Pain (4 - 6)  oxyCODONE    5 mG/acetaminophen 325 mG 2 Tablet(s) Oral every 6 hours PRN Severe Pain (7 - 10)  simethicone 80 milliGRAM(s) Chew every 6 hours PRN Gas      FAMILY HISTORY:  Family history of prostate cancer in father (Father)      Allergies    Naprosyn (Nausea)    Intolerances        SOCIAL HISTORY:    [  ] Etoh  [  ] Smoking  [  ] Substance abuse     Home Environment:  [  ] Home Alone  [xx  ] Lives with Family  [  ] Home Health Aid    Dwelling:  [  ] Apartment  [x  ] Private House  [  ] Adult Home  [  ] Skilled Nursing Facility      [  ] Short Term  [  ] Long Term  [ x ] Stairs       Elevator [  ]    FUNCTIONAL STATUS PTA: (Check all that apply)  Ambulation: [ x  ]Independent    [  ] Dependent     [  ] Non-Ambulatory  Assistive Device: [  ] SA Cane  [  ]  Q Cane  [  ] Walker  [  ]  Wheelchair  ADL : [x  ] Independent  [  ]  Dependent       Vital Signs Last 24 Hrs  T(C): 36.8 (23 Jul 2018 08:00), Max: 37.1 (22 Jul 2018 12:00)  T(F): 98.2 (23 Jul 2018 08:00), Max: 98.8 (22 Jul 2018 12:00)  HR: 101 (23 Jul 2018 08:00) (92 - 108)  BP: 178/99 (23 Jul 2018 08:00) (126/66 - 183/74)  BP(mean): 111 (22 Jul 2018 15:00) (90 - 112)  RR: 20 (23 Jul 2018 08:00) (15 - 35)  SpO2: 93% (23 Jul 2018 02:27) (90% - 99%)      PHYSICAL EXAM: Alert & Oriented X3  GENERAL: NAD, well-groomed, well-developed  HEAD:  Atraumatic, Normocephalic  CHEST/LUNG: Clear   HEART: Regular   ABDOMEN: Soft, Nontender  EXTREMITIES:  no calf tenderness    NERVOUS SYSTEM:  Cranial Nerves 2-12 intact [  ] Abnormal  [  ]  ROM: WFL all extremities [x  ]  Abnormal [  ]  Motor Strength: WFL all extremities  [x  ]  Abnormal [  ]  Sensation: intact to light touch [x  ] Abnormal [  ]  Reflexes: Symmetric [  ]  Abnormal [  ]    FUNCTIONAL STATUS:  Bed Mobility: Independent [  ]  Supervision [  ]  Needs Assistance [x  ]  N/A [  ]  Transfers: Independent [  ]  Supervision [  ]  Needs Assistance [ x ]  N/A [  ]   Ambulation: Independent [  ]  Supervision [  ]  Needs Assistance [  ]  N/A [  ]  ADL: Independent [  ] Requires Assistance [  ] N/A [  ]      LABS:                        9.0    5.06  )-----------( 128      ( 23 Jul 2018 07:26 )             26.7     07-23    138  |  103  |  13  ----------------------------<  122<H>  4.1   |  28  |  0.6<L>    Ca    9.1      23 Jul 2018 07:26  Phos  2.0     07-23  Mg     2.0     07-23      PT/INR - ( 22 Jul 2018 11:30 )   PT: 13.70 sec;   INR: 1.27 ratio         PTT - ( 22 Jul 2018 11:30 )  PTT:34.2 sec      RADIOLOGY & ADDITIONAL STUDIES:    Assesment:

## 2021-07-07 ENCOUNTER — APPOINTMENT (OUTPATIENT)
Dept: PLASTIC SURGERY | Facility: CLINIC | Age: 46
End: 2021-07-07
Payer: COMMERCIAL

## 2021-07-07 PROCEDURE — 99212 OFFICE O/P EST SF 10 MIN: CPT

## 2021-07-07 PROCEDURE — 99072 ADDL SUPL MATRL&STAF TM PHE: CPT

## 2021-07-07 NOTE — HISTORY OF PRESENT ILLNESS
[FreeTextEntry1] : 45 y/o F, G0, with PMH of left breast CA s/p left mastectomy and SLNBx with Dr. Mims ?2016, and HTN, who presents for breast reconstruction consultation. Pt first found the lesion on initial screening mammogram; has not done any mammograms prior. Now up to date with annual mammograms and US. Last mammogram April 2019- normal per pt (done at [facility]). No CTX or XRT. On Tamoxifen x5 years. Reports chronic skin irritation to bilateral IMFs but denies neck/back or shoulder pain. Had a right breast I&D vs lumpectomy done- ?abscess; pt reports "bacteria", no malignancy. \par \par Ht 5'5" Wt 190lb; has been losing weight with diet\par Current bra size: unknown - happy with current size\par +FHx: sister- DCIS, dx at age 48; cousin, dx at age 40\par Oncologist: Landon\par Denies hx of VTE or MRSA infections\par Occ: [employer]\par \par Interval hx (11/22/19): Pt presents for f/u.  She has lost weight and her clothes fit better per pt.  She now weight in the 180s (lbs).  Pt feels ready to proceed with left breast reconstruction with TE/Alloderm.  Father is present for the discussion.  Pt weighed today; 187 lbs.\par \par Interval hx (3/11/20): Pt presents for first postoperative visit- POD #8 s/p delayed left breast reconstruction with TE/alloderm. Denies f/c, pain, CP/SOB, or calf pain. Completed PO abx. Drain output: 38/34/60.\par \par Interval hx (3/18/20):  Here for 2nd post-op visit POD#15 s/p delayed left breast reconstruction with TE/alldoerm. Drains 38/32/10.  Denies fevers, chills, redness, swelling.  complaint with lifting restrictions\par \par Interval hx (3/23/20): Here for serial TE fill.  Pt tolerated last fill without issue.   Denies fevers, chills, redness, swelling. c/w lifting restrictions\par \par Interval hx (4/1/20). Patient presents today POD#29 for f/u and serial TE fill. Doing well and reporting no new complaints. Tolerating expansion without any issues. \par \par Interval hx (4/8/20): Pt presents for POD #36 visit and serial TE fill. Reports last fill over 80cc was slightly more painful and less well tolerated as compared to 60cc. Otherwise feeling well.\par \par Interval hx (4/15/20): Pt presents for POD #43 visit and serial TE fill. Feeling well and tolerated last fill.\par \par Interval hx (4/22/20): Pt presents for POD #50 visit and serial TE fill. Feeling well and tolerated last fill.\par \par Interval hx (4/29/20): Pt presents for POD #57 visit and serial TE fill. Feeling well and tolerated last fill.\par \par Interval hx (5/7/20): Pt presents for POD #64 visit and serial TE fill. Feeling well and tolerated last fill.\par \par Interval hx (7/15/20): Here for f/u POD #7 s/p left TE exchange to permanent silicone implant and excision of left axillary roll. Denies pain, f/c, or drainage. Happy with results. Feels left breast is still slightly smaller than right.\par \par Interval hx (7/21/20): Here for f/u POD #13 s/p left TE exchange to permanent silicone implant and excision of left axillary roll\par \par Interval hx (8/21/20)  6 weeks s/p left TE exchange to silicone implant.  No complaints.  She is to return to work 8/27 and needs work release.\par \par Interval hx (10/2/20): 3 months s/p left TE exchange to silicone implant.  No complaints.  No issues with return to work.\par \par Interval hx (10/2/20): 6 months s/p left TE exchange to silicone implant.  No complaints. Working without issues.  Pt very happy with left breast appearance.   Not interested in right breast reduction for symmetry.\par \par Interval hx (7/7/21): 1 year s/p left TE exchange to silicone implant.  No complaints.  pt very happy with results of left breast recon.  Had right mammogram 2 weeks ago; benign results.

## 2021-07-07 NOTE — PHYSICAL EXAM
[de-identified] : well-appearing, NAD [de-identified] : Right breast: large pendulous breast with no palpable masses, nipple retraction or discharge. Well healed superior periareolar scar with minor contour depression at 9 o'clock\par Left breast: implant soft and mobile, incisions along central breast and axillary roll healing well with resolved scar band

## 2021-07-07 NOTE — ASSESSMENT
[FreeTextEntry1] : 44 y/o F with hx of left breast cancer s/p mastectomy, no XRT/CTX.\par s/p delayed left breast reconstruction with TE/AlloDerm in the process of expansion 3/3/20\par \par 1 year s/p left TE exchange to permanent silicone implant and excision of left axillary roll, doing well\par Pt happy to reconstructive results.  No signs on capsular contracture\par \par -c/w daily moisturizer\par -Offered right breast symmetry procedure; pt declined at this time and will consider in the future\par -Pt knows to call for fevers, chills, redness, swelling\par -F/u 1 year for implant check\par \par Due to COVID-19, pre-visit patient instructions were explained to the patient and their symptoms were checked upon arrival. Masks were used by the healthcare provider and staff and the examination room was cleaned after the patient visit concluded

## 2021-09-08 NOTE — PROCEDURE
[FreeTextEntry1] : T [FreeTextEntry2] : TE fill [FreeTextEntry6] : The port location was identified and marked on the skin with the aid of the implant magnet. The skin was prepped in sterile fashion. Sterile saline was injected. The skin flaps remained well perfused, pink with capillary refill <2 sec. The patient tolerated the procedure.\par \par Left TE pre-expansion volume: awaiting operative report\par 30cc injected today\par  10

## 2021-09-14 NOTE — H&P PST ADULT - NS SC CAGE ALCOHOL GUILTY ABOUT
post-op plan: NWB, PT/OT, ancef per protocol, contralateral SCD, LMWH ASA x 4 weeks post-op, splint until f/r 2-3 weeks no

## 2021-10-06 ENCOUNTER — APPOINTMENT (OUTPATIENT)
Dept: CARDIOLOGY | Facility: CLINIC | Age: 46
End: 2021-10-06

## 2021-12-07 ENCOUNTER — APPOINTMENT (OUTPATIENT)
Dept: CARDIOLOGY | Facility: CLINIC | Age: 46
End: 2021-12-07
Payer: COMMERCIAL

## 2021-12-07 VITALS
HEIGHT: 65 IN | DIASTOLIC BLOOD PRESSURE: 80 MMHG | SYSTOLIC BLOOD PRESSURE: 142 MMHG | WEIGHT: 292 LBS | TEMPERATURE: 97.3 F | BODY MASS INDEX: 48.65 KG/M2 | HEART RATE: 84 BPM

## 2021-12-07 DIAGNOSIS — C50.919 MALIGNANT NEOPLASM OF UNSPECIFIED SITE OF UNSPECIFIED FEMALE BREAST: ICD-10-CM

## 2021-12-07 DIAGNOSIS — Z86.79 PERSONAL HISTORY OF OTHER DISEASES OF THE CIRCULATORY SYSTEM: ICD-10-CM

## 2021-12-07 PROCEDURE — 99204 OFFICE O/P NEW MOD 45 MIN: CPT

## 2021-12-07 PROCEDURE — 93000 ELECTROCARDIOGRAM COMPLETE: CPT

## 2021-12-07 RX ORDER — CELECOXIB 400 MG/1
400 CAPSULE ORAL
Qty: 7 | Refills: 0 | Status: DISCONTINUED | COMMUNITY
Start: 2020-02-27 | End: 2021-12-07

## 2021-12-07 RX ORDER — GABAPENTIN 300 MG/1
300 CAPSULE ORAL
Qty: 7 | Refills: 0 | Status: DISCONTINUED | COMMUNITY
Start: 2020-02-27 | End: 2021-12-07

## 2021-12-07 RX ORDER — VALSARTAN AND HYDROCHLOROTHIAZIDE 160; 25 MG/1; MG/1
160-25 TABLET, FILM COATED ORAL
Refills: 0 | Status: ACTIVE | COMMUNITY

## 2021-12-07 RX ORDER — HYDROCHLOROTHIAZIDE 25 MG/1
25 TABLET ORAL
Refills: 0 | Status: DISCONTINUED | COMMUNITY
End: 2021-12-07

## 2021-12-07 NOTE — ASSESSMENT
[FreeTextEntry1] : The patient has history of breast CA with an abnormal ECG poor R wave progression V1-V3  .The patient has a family history of CAD and AS /Bicuspid AV . No AS or AI on exam .  It is suggested that the patient has PRAVEEN .

## 2021-12-07 NOTE — HISTORY OF PRESENT ILLNESS
[FreeTextEntry1] : The patient has a history of breast CA S/P mastectomy and breast reconstruction . The patient was placed on Tamoxifen. The patient was noted to have an abnormal ECG and was sent here for evaluation . The patient has had no chest pain or SOB . The patient's sister has ? bicupid AV /AS and no signficant CAD and renal failure .

## 2021-12-07 NOTE — REVIEW OF SYSTEMS
[Feeling Fatigued] : feeling fatigued [Cough] : cough [Wheezing] : wheezing [Diarrhea] : diarrhea [Joint Pain] : joint pain [Joint Swelling] : joint swelling [Negative] : Psychiatric [SOB] : no shortness of breath [Chest Discomfort] : no chest discomfort [Palpitations] : no palpitations [FreeTextEntry2] : snores at night .

## 2021-12-07 NOTE — REASON FOR VISIT
[Arrhythmia/ECG Abnorrmalities] : arrhythmia/ECG abnormalities [Family Member] : family member [FreeTextEntry3] : Dr. Wang

## 2021-12-28 ENCOUNTER — APPOINTMENT (OUTPATIENT)
Dept: CARDIOLOGY | Facility: CLINIC | Age: 46
End: 2021-12-28
Payer: COMMERCIAL

## 2021-12-28 DIAGNOSIS — E66.9 OBESITY, UNSPECIFIED: ICD-10-CM

## 2021-12-28 PROCEDURE — 93351 STRESS TTE COMPLETE: CPT

## 2021-12-28 PROCEDURE — 93320 DOPPLER ECHO COMPLETE: CPT

## 2021-12-28 PROCEDURE — 93325 DOPPLER ECHO COLOR FLOW MAPG: CPT

## 2021-12-29 PROBLEM — E66.9 OBESITY: Status: ACTIVE | Noted: 2021-12-07

## 2022-03-02 ENCOUNTER — APPOINTMENT (OUTPATIENT)
Dept: CARDIOLOGY | Facility: CLINIC | Age: 47
End: 2022-03-02

## 2022-05-16 NOTE — ASSESSMENT
[FreeTextEntry1] : 44 y/o F with hx of left breast cancer s/p mastectomy, no XRT/CTX.\par s/p delayed left breast reconstruction with TE/AlloDerm in the process of expansion 3/3/20\par \par Now POD #13 s/p left TE exchange to permanent silicone implant and excision of left axillary roll, doing well\par \par -Sutures removed.\par -May resume sports bra\par -Tylenol PRN\par -C/w ambulation\par -No heavy lifting\par -Scar cream daily starting next week\par -Pt knows to call for fevers, chills, redness, swelling\par -F/u 1 month for physical activity clearance and photos yes

## 2022-06-14 ENCOUNTER — APPOINTMENT (OUTPATIENT)
Dept: CARDIOLOGY | Facility: CLINIC | Age: 47
End: 2022-06-14

## 2022-08-29 ENCOUNTER — APPOINTMENT (OUTPATIENT)
Dept: PLASTIC SURGERY | Facility: CLINIC | Age: 47
End: 2022-08-29

## 2022-08-29 PROCEDURE — 99213 OFFICE O/P EST LOW 20 MIN: CPT

## 2022-08-29 NOTE — HISTORY OF PRESENT ILLNESS
[FreeTextEntry1] : 43 y/o F, G0, with PMH of left breast CA s/p left mastectomy and SLNBx with Dr. Mims ?2016, and HTN, who presents for breast reconstruction consultation. Pt first found the lesion on initial screening mammogram; has not done any mammograms prior. Now up to date with annual mammograms and US. Last mammogram April 2019- normal per pt (done at [facility]). No CTX or XRT. On Tamoxifen x5 years. Reports chronic skin irritation to bilateral IMFs but denies neck/back or shoulder pain. Had a right breast I&D vs lumpectomy done- ?abscess; pt reports "bacteria", no malignancy. \par \par Ht 5'5" Wt 190lb; has been losing weight with diet\par Current bra size: unknown - happy with current size\par +FHx: sister- DCIS, dx at age 48; cousin, dx at age 40\par Oncologist: Landon\par Denies hx of VTE or MRSA infections\par Occ: [employer]\par \par Interval hx (11/22/19): Pt presents for f/u.  She has lost weight and her clothes fit better per pt.  She now weight in the 180s (lbs).  Pt feels ready to proceed with left breast reconstruction with TE/Alloderm.  Father is present for the discussion.  Pt weighed today; 187 lbs.\par \par Interval hx (3/11/20): Pt presents for first postoperative visit- POD #8 s/p delayed left breast reconstruction with TE/alloderm. Denies f/c, pain, CP/SOB, or calf pain. Completed PO abx. Drain output: 38/34/60.\par \par Interval hx (3/18/20):  Here for 2nd post-op visit POD#15 s/p delayed left breast reconstruction with TE/alldoerm. Drains 38/32/10.  Denies fevers, chills, redness, swelling.  complaint with lifting restrictions\par \par Interval hx (3/23/20): Here for serial TE fill.  Pt tolerated last fill without issue.   Denies fevers, chills, redness, swelling. c/w lifting restrictions\par \par Interval hx (4/1/20). Patient presents today POD#29 for f/u and serial TE fill. Doing well and reporting no new complaints. Tolerating expansion without any issues. \par \par Interval hx (4/8/20): Pt presents for POD #36 visit and serial TE fill. Reports last fill over 80cc was slightly more painful and less well tolerated as compared to 60cc. Otherwise feeling well.\par \par Interval hx (4/15/20): Pt presents for POD #43 visit and serial TE fill. Feeling well and tolerated last fill.\par \par Interval hx (4/22/20): Pt presents for POD #50 visit and serial TE fill. Feeling well and tolerated last fill.\par \par Interval hx (4/29/20): Pt presents for POD #57 visit and serial TE fill. Feeling well and tolerated last fill.\par \par Interval hx (5/7/20): Pt presents for POD #64 visit and serial TE fill. Feeling well and tolerated last fill.\par \par Interval hx (7/15/20): Here for f/u POD #7 s/p left TE exchange to permanent silicone implant and excision of left axillary roll. Denies pain, f/c, or drainage. Happy with results. Feels left breast is still slightly smaller than right.\par \par Interval hx (7/21/20): Here for f/u POD #13 s/p left TE exchange to permanent silicone implant and excision of left axillary roll\par \par Interval hx (8/21/20)  6 weeks s/p left TE exchange to silicone implant.  No complaints.  She is to return to work 8/27 and needs work release.\par \par Interval hx (10/2/20): 3 months s/p left TE exchange to silicone implant.  No complaints.  No issues with return to work.\par \par Interval hx (10/2/20): 6 months s/p left TE exchange to silicone implant.  No complaints. Working without issues.  Pt very happy with left breast appearance.   Not interested in right breast reduction for symmetry.\par \par Interval hx (7/7/21): 1 year s/p left TE exchange to silicone implant.  No complaints.  pt very happy with results of left breast recon.  Had right mammogram 2 weeks ago; benign results.\par \par Interval hx (8/29/22): 2 years s/p  left TE exchange to silicone implant.  No complaints.  pt very happy with results of left breast recon.   Recent right [facility] benign - [facility] with [facility]. Navarrete.\par

## 2022-08-29 NOTE — ASSESSMENT
[FreeTextEntry1] : 44 y/o F with hx of left breast cancer s/p mastectomy, no XRT/CTX.\par s/p delayed left breast reconstruction with TE/AlloDerm in the process of expansion 3/3/20\par \par 2 years s/p left TE exchange to permanent silicone implant and excision of left axillary roll, doing well\par Pt happy to reconstructive results.  No signs on capsular contracture\par \par -c/w daily moisturizer\par -Offered right breast symmetry procedure; pt declined at this time.\par -Pt knows to call for fevers, chills, redness, swelling\par -Follow up with Dr. Navarrete - [facility]\par -F/u 1 year for implant check\par \par Due to COVID-19, pre-visit patient instructions were explained to the patient and their symptoms were checked upon arrival. Masks were used by the healthcare provider and staff and the examination room was cleaned after the patient visit concluded

## 2022-08-29 NOTE — PHYSICAL EXAM
[de-identified] : well-appearing, NAD [de-identified] : Right breast: large pendulous breast with no palpable masses, nipple retraction or discharge. Well healed superior periareolar scar with minor contour depression at 9 o'clock\par Left breast: implant soft and mobile, incisions along central breast and axillary roll healing well with no scar band

## 2022-10-25 ENCOUNTER — APPOINTMENT (OUTPATIENT)
Dept: CARDIOLOGY | Facility: CLINIC | Age: 47
End: 2022-10-25

## 2022-12-30 ENCOUNTER — APPOINTMENT (OUTPATIENT)
Dept: CARDIOLOGY | Facility: CLINIC | Age: 47
End: 2022-12-30

## 2023-04-02 NOTE — PHYSICAL THERAPY INITIAL EVALUATION ADULT - LIVES WITH, PROFILE
Problem: Diabetes Comorbidity  Goal: Blood Glucose Level Within Targeted Range  Outcome: Ongoing, Progressing  Intervention: Monitor and Manage Glycemia  Flowsheets (Taken 4/2/2023 1714)  Glycemic Management:   blood glucose monitored   supplemental insulin given   oral hydration promoted     Problem: Infection  Goal: Absence of Infection Signs and Symptoms  Outcome: Ongoing, Progressing  Intervention: Prevent or Manage Infection  Flowsheets (Taken 4/2/2023 1714)  Infection Management: aseptic technique maintained  Isolation Precautions: precautions maintained      alone

## 2023-09-25 ENCOUNTER — APPOINTMENT (OUTPATIENT)
Dept: PLASTIC SURGERY | Facility: CLINIC | Age: 48
End: 2023-09-25
Payer: COMMERCIAL

## 2023-09-25 PROCEDURE — 99214 OFFICE O/P EST MOD 30 MIN: CPT

## 2023-10-17 ENCOUNTER — APPOINTMENT (OUTPATIENT)
Dept: CARDIOLOGY | Facility: CLINIC | Age: 48
End: 2023-10-17
Payer: COMMERCIAL

## 2023-10-17 VITALS
HEART RATE: 74 BPM | BODY MASS INDEX: 40.15 KG/M2 | WEIGHT: 241 LBS | DIASTOLIC BLOOD PRESSURE: 82 MMHG | SYSTOLIC BLOOD PRESSURE: 120 MMHG | HEIGHT: 65 IN

## 2023-10-17 PROCEDURE — 93000 ELECTROCARDIOGRAM COMPLETE: CPT

## 2023-10-17 PROCEDURE — 99214 OFFICE O/P EST MOD 30 MIN: CPT | Mod: 25

## 2023-10-17 RX ORDER — DOXYCYCLINE HYCLATE 100 MG/1
100 TABLET ORAL
Qty: 3 | Refills: 2 | Status: DISCONTINUED | COMMUNITY
Start: 2023-09-25 | End: 2023-10-17

## 2023-10-17 RX ORDER — TAMOXIFEN CITRATE 10 MG/1
10 TABLET, FILM COATED ORAL
Refills: 0 | Status: DISCONTINUED | COMMUNITY
End: 2023-10-17

## 2024-01-02 NOTE — ASU PATIENT PROFILE, ADULT - CENTRAL VENOUS CATHETER
Patient Name: Val Nassar  : 1960    MRN: [id number]                              Today's Date: 2024       Admit Date: 2023    Visit Dx:     ICD-10-CM ICD-9-CM   1. Alcoholic cirrhosis of liver without ascites  K70.30 571.2   2. Severe anemia  D64.9 285.9   3. Cholecystitis  K81.9 575.10   4. LLQ abdominal pain  R10.32 789.04   5. Iron deficiency anemia due to chronic blood loss  D50.0 280.0   6. Duodenal ulcer  K26.9 532.90     Patient Active Problem List   Diagnosis    Anemia    Acute gastric ulcer with hemorrhage    Alcohol abuse, uncomplicated    Anxiety disorder, unspecified    Diverticulum of bladder    Duodenal ulcer, unspecified as acute or chronic, without hemorrhage or perforation    Elevation of level of transaminase and lactic acid dehydrogenase (LDH)    Gastro-esophageal reflux disease without esophagitis    Hyperglycemia, unspecified    Hyperlipidemia, unspecified    Melena    Nicotine dependence, cigarettes, uncomplicated    Tobacco use    Severe malnutrition     History reviewed. No pertinent past medical history.  Past Surgical History:   Procedure Laterality Date    ENDOSCOPY N/A 2023    Procedure: ESOPHAGOGASTRODUODENOSCOPY;  Surgeon: Wesly Mckeon MD;  Location: [facility] ENDOSCOPY;  Service: Gastroenterology;  Laterality: N/A;      General Information       Row Name 24 1425          OT Time and Intention    Document Type therapy note (daily note)  -KF     Mode of Treatment occupational therapy;individual therapy  -KF       Row Name 24 1425          General Information    Patient Profile Reviewed yes  -KF     Existing Precautions/Restrictions fall  -KF     Barriers to Rehab medically complex  -KF       Row Name 24 1425          Cognition    Orientation Status (Cognition) oriented x 3  -KF       Row Name 24 1425          Safety Issues, Functional Mobility    Safety Issues Affecting Function (Mobility) safety precaution awareness;safety  precautions follow-through/compliance;positioning of assistive device  -     Impairments Affecting Function (Mobility) balance;endurance/activity tolerance;strength;pain;motor planning;range of motion (ROM)  -               User Key  (r) = Recorded By, (t) = Taken By, (c) = Cosigned By      Initials Name Provider Type    KF Chioma Nava OT Occupational Therapist                     Mobility/ADL's       Row Name 01/02/24 1425          Bed Mobility    Bed Mobility supine-sit  -     Supine-Sit [place] (Bed Mobility) modified independence  -       Row Name 01/02/24 1425          Transfers    Transfers sit-stand transfer;stand-sit transfer  -       Row Name 01/02/24 1425          Sit-Stand Transfer    Sit-Stand [place] (Transfers) standby assist  -     Assistive Device (Sit-Stand Transfers) walker, front-wheeled;cane, straight  -[facility] Name 01/02/24 1425          Stand-Sit Transfer    Stand-Sit [place] (Transfers) standby assist  -     Assistive Device (Stand-Sit Transfers) cane, straight;walker, front-wheeled  -[facility] Name 01/02/24 1425          Functional Mobility    Functional Mobility- Ind. Level contact guard assist;verbal cues required  -     Functional Mobility- Device walker, front-wheeled  -     Functional Mobility- Comment Pt ambulated >household distance using a RWx with CGA, near SBA. Verbal cues for RWx management when turning as pt tends to leave RWx to the side before sitting.  -       Row Name 01/02/24 1425          Activities of Daily Living    BADL Assessment/Intervention lower body dressing;grooming  -[facility] Name 01/02/24 1425          Lower Body Dressing Assessment/Training    [place] Level (Lower Body Dressing) don;socks;set up  -     Position (Lower Body Dressing) edge of bed sitting  -       Row Name 01/02/24 1425          Grooming Assessment/Training    [place] Level (Grooming) oral care regimen;set up;standby assist  -      Position (Grooming) sink side  -KF               User Key  (r) = Recorded By, (t) = Taken By, (c) = Cosigned By      Initials Name Provider Type    Chioma Zuniga OT Occupational Therapist                   Obj/Interventions       Row Name 01/02/24 1428          Balance    Balance Assessment sitting static balance;sitting dynamic balance;sit to stand dynamic balance;standing static balance;standing dynamic balance  -KF     Static Sitting Balance independent  -KF     Dynamic Sitting Balance supervision  -KF     Position, Sitting Balance unsupported;sitting edge of bed  -KF     Sit to Stand Dynamic Balance standby assist;contact guard  -KF     Static Standing Balance standby assist  -KF     Dynamic Standing Balance contact guard  -KF     Position/Device Used, Standing Balance supported;walker, front-wheeled  -KF     Balance Interventions sitting;standing;sit to stand;supported;dynamic;occupation based/functional task  -KF               User Key  (r) = Recorded By, (t) = Taken By, (c) = Cosigned By      Initials Name Provider Type    Chioma Zuniga OT Occupational Therapist                   Goals/Plan    No documentation.                  Clinical Impression       Row Name 01/02/24 1428          Pain Assessment    Pretreatment Pain Rating 7/10  -KF     Posttreatment Pain Rating 7/10  -KF     Pain Location lower  -KF     Pain Location - back  -KF     Pain Intervention(s) Repositioned;Ambulation/increased activity  -KF       Row Name 01/02/24 1428          Plan of Care Review    Plan of Care Reviewed With patient  -KF     Progress improving  -KF     Outcome Evaluation Pt with good participation and progress toward goals during OT session today. The pt performed >household distance ambulation using a RWx with CGA, near SBA with cueing for RWx management prior to sitting. The pt completed grooming ADLs sink side with set up and SBA. The pt remains below his functional baseline with generalized weakness,  decreased activity tolerance, and mild balance deficits. The pt will benefit from continued IP OT services to increase the pt's safety and independence during ADLs and functional mobility. Continue to recommend IRF at discharge for best outcome.  -       Row Name 01/02/24 1428          Therapy Assessment/Plan (OT)    Rehab Potential (OT) good, to achieve stated therapy goals  -KF     Criteria for Skilled Therapeutic Interventions Met (OT) yes;skilled treatment is necessary  -KF     Therapy Frequency (OT) daily  -KF       Row Name 01/02/24 1428          Therapy Plan Review/Discharge Plan (OT)    Anticipated Discharge Disposition (OT) inpatient rehabilitation facility  -       Row Name 01/02/24 1428          Vital Signs    Pre Patient Position Supine  -KF     Intra Patient Position Standing  -KF     Post Patient Position Sitting  -KF       Row Name 01/02/24 1428          Positioning and Restraints    Pre-Treatment Position in bed  -KF     Post Treatment Position chair  -KF     In Chair notified nsg;reclined;call light within reach;encouraged to call for assist  RN deferred chair alarm  -KF               User Key  (r) = Recorded By, (t) = Taken By, (c) = Cosigned By      Initials Name Provider Type    KF Chioma Nava, OT Occupational Therapist                   Outcome Measures       Row Name 01/02/24 1431          How much help from another is currently needed...    Putting on and taking off regular lower body clothing? 3  -KF     Bathing (including washing, rinsing, and drying) 3  -KF     Toileting (which includes using toilet bed pan or urinal) 3  -KF     Putting on and taking off regular upper body clothing 4  -KF     Taking care of personal grooming (such as brushing teeth) 3  -KF     Eating meals 4  -KF     AM-PAC 6 Clicks Score (OT) 20  -KF       Row Name 01/02/24 0855          How much help from another person do you currently need...    Turning from your back to your side while in flat bed without using  bedrails? 4  -NS     Moving from lying on back to sitting on the side of a flat bed without bedrails? 3  -NS     Moving to and from a bed to a chair (including a wheelchair)? 3  -NS     Standing up from a chair using your arms (e.g., wheelchair, bedside chair)? 3  -NS     Climbing 3-5 steps with a railing? 3  -NS     To walk in hospital room? 3  -NS     AM-PAC 6 Clicks Score (PT) 19  -NS     Highest Level of Mobility Goal 6 --> Walk 10 steps or more  -NS       Row Name 01/02/24 1431 01/02/24 0855       Functional Assessment    Outcome Measure Options AM-PAC 6 Clicks Daily Activity (OT)  -KF AM-PAC 6 Clicks Basic Mobility (PT)  -NS              User Key  (r) = Recorded By, (t) = Taken By, (c) = Cosigned By      Initials Name Provider Type    Nava Ramires, PT Physical Therapist    Chioma Zuniga, OT Occupational Therapist                    Occupational Therapy Education       Title: PT OT SLP Therapies (In Progress)       Topic: Occupational Therapy (In Progress)       Point: ADL training (Done)       Description:   Instruct learner(s) on proper safety adaptation and remediation techniques during self care or transfers.   Instruct in proper use of assistive devices.                  Learning Progress Summary             Patient Acceptance, E,TB, VU,DU by  at 1/2/2024 1350    Acceptance, E,TB, VU,DU by  at 12/29/2023 1353    Acceptance, E, NR by  at 12/26/2023 1531                         Point: Home exercise program (Not Started)       Description:   Instruct learner(s) on appropriate technique for monitoring, assisting and/or progressing therapeutic exercises/activities.                  Learner Progress:  Not documented in this visit.              Point: Precautions (Done)       Description:   Instruct learner(s) on prescribed precautions during self-care and functional transfers.                  Learning Progress Summary             Patient Acceptance, E,TB, VU,DU by  at 1/2/2024 1350     Acceptance, E,TB, VU,DU by  at 12/29/2023 1353    Acceptance, E, NR by  at 12/26/2023 1531                         Point: Body mechanics (Done)       Description:   Instruct learner(s) on proper positioning and spine alignment during self-care, functional mobility activities and/or exercises.                  Learning Progress Summary             Patient Acceptance, E,TB, VU,DU by  at 1/2/2024 1350    Acceptance, E,TB, VU,DU by  at 12/29/2023 1353    Acceptance, E, NR by  at 12/26/2023 1531                                         User Key       Initials Effective Dates Name Provider Type Discipline     10/14/22 -  Sandy Alarcon OT Occupational Therapist OT     08/09/23 -  Chioma Nava OT Occupational Therapist OT                  OT Recommendation and Plan  Therapy Frequency (OT): daily  Plan of Care Review  Plan of Care Reviewed With: patient  Progress: improving  Outcome Evaluation: Pt with good participation and progress toward goals during OT session today. The pt performed >household distance ambulation using a RWx with CGA, near SBA with cueing for RWx management prior to sitting. The pt completed grooming ADLs sink side with set up and SBA. The pt remains below his functional baseline with generalized weakness, decreased activity tolerance, and mild balance deficits. The pt will benefit from continued IP OT services to increase the pt's safety and independence during ADLs and functional mobility. Continue to recommend IRF at discharge for best outcome.     Time Calculation:         Time Calculation- OT       Row Name 01/02/24 1431 01/02/24 0855          Time Calculation- OT    OT Start Time 1350  -KF --     OT Received On 01/02/24 -KF --     OT Goal Re-Cert Due Date 01/05/24  -KF --        Timed Charges    90654 - Gait Training Minutes  -- 12  -NS     17937 - OT Therapeutic Activity Minutes 6  -KF --     78748 - OT Self Care/Mgmt Minutes 8 -KF --        Total Minutes    Timed Charges  Total Minutes 14  -KF 12  -NS      Total Minutes 14  -KF 12  -NS               User Key  (r) = Recorded By, (t) = Taken By, (c) = Cosigned By      Initials Name Provider Type    Nava Ramires, PT Physical Therapist    KF Chioma Nava, OT Occupational Therapist                  Therapy Charges for Today       Code Description Service Date Service Provider Modifiers Qty    29196189350 HC OT SELF CARE/MGMT/TRAIN EA 15 MIN 1/2/2024 Chioma Nava OT GO 1                 Chioma Nava OT  1/2/2024   no

## 2024-01-09 ENCOUNTER — APPOINTMENT (OUTPATIENT)
Dept: CARDIOLOGY | Facility: CLINIC | Age: 49
End: 2024-01-09
Payer: COMMERCIAL

## 2024-01-09 DIAGNOSIS — I10 ESSENTIAL (PRIMARY) HYPERTENSION: ICD-10-CM

## 2024-01-09 DIAGNOSIS — R94.31 ABNORMAL ELECTROCARDIOGRAM [ECG] [EKG]: ICD-10-CM

## 2024-01-09 PROCEDURE — 93306 TTE W/DOPPLER COMPLETE: CPT

## 2024-01-15 PROBLEM — I10 HYPERTENSION: Status: ACTIVE | Noted: 2021-12-07

## 2024-01-15 PROBLEM — R94.31 ABNORMAL ECG: Status: ACTIVE | Noted: 2021-12-07

## 2024-03-18 NOTE — H&P PST ADULT - PAIN SCALE PREFERRED, PROFILE
Detail Level: Detailed General Sunscreen Counseling: I recommended a broad-spectrum sunscreen with a SPF of 30 or higher.  I explained that SPF 30 sunscreens block approximately 97 percent of the sun's harmful rays.  Sunscreens should be applied at least 15 minutes prior to expected sun exposure and then every 2 hours after that as long as sun exposure continues. If swimming or exercising sunscreen should be reapplied every 45 minutes to an hour after getting wet or sweating.  One ounce, or the equivalent of a shot glass full of sunscreen, is adequate to protect the skin not covered by a bathing suit. I also recommended a lip balm with a sunscreen as well. Sun protective clothing can be used in lieu of sunscreen but must be worn the entire time you are exposed to the sun's rays. none

## 2024-08-03 NOTE — BRIEF OPERATIVE NOTE - COMMENTS
Left breast Switchback [facility] Ultra High Profile Tissue expander 850cc, SN [id number]
Opt out

## 2024-08-08 NOTE — ASU PATIENT PROFILE, ADULT - REASON FOR ADMISSION, PROFILE
D/c instructions reviewed and pt verbalized understanding. Pt ambulatory and stable at time of d/c   left breast reconstruction

## 2024-08-13 ENCOUNTER — APPOINTMENT (OUTPATIENT)
Dept: CARDIOLOGY | Facility: CLINIC | Age: 49
End: 2024-08-13
Payer: COMMERCIAL

## 2024-08-13 VITALS
HEART RATE: 76 BPM | HEIGHT: 65 IN | BODY MASS INDEX: 41.32 KG/M2 | WEIGHT: 248 LBS | SYSTOLIC BLOOD PRESSURE: 110 MMHG | DIASTOLIC BLOOD PRESSURE: 80 MMHG

## 2024-08-13 DIAGNOSIS — E66.9 OBESITY, UNSPECIFIED: ICD-10-CM

## 2024-08-13 DIAGNOSIS — R94.31 ABNORMAL ELECTROCARDIOGRAM [ECG] [EKG]: ICD-10-CM

## 2024-08-13 DIAGNOSIS — G89.18 OTHER ACUTE POSTPROCEDURAL PAIN: ICD-10-CM

## 2024-08-13 DIAGNOSIS — I10 ESSENTIAL (PRIMARY) HYPERTENSION: ICD-10-CM

## 2024-08-13 PROCEDURE — 93000 ELECTROCARDIOGRAM COMPLETE: CPT

## 2024-08-13 PROCEDURE — 99214 OFFICE O/P EST MOD 30 MIN: CPT | Mod: 25

## 2024-08-13 NOTE — HISTORY OF PRESENT ILLNESS
[FreeTextEntry1] : The patient has not been seen since  . the patient had an ETT echo which was negative for ischemia after compleitng stag I . The patient has breast CA and has had mastectomy and reconstructive surgery . The patient has family history of bicuspid AV and CAD ( sister ) .  She os obese and had recently lost weight .

## 2024-08-13 NOTE — CARDIOLOGY SUMMARY
[de-identified] :  ETT Echo completed stage I No ischeia Aortic vavlve was not well seen  [de-identified] : 12-7-2021 NSR Poor R wave progressin V1-V3  [de-identified] : 1-9-2024 EF 71% mild MR mild TR IVC 1.70 with normal collapse

## 2024-08-13 NOTE — ASSESSMENT
[FreeTextEntry1] : The patient has not had chest pain or SOB  .BP is well controlled. Has not had cholesterol checked. She is obese but had lost about 50 pounds last year . . She has a family history of early CAD . Was unable to get CAC score done secondary to insurance .

## 2024-09-23 ENCOUNTER — APPOINTMENT (OUTPATIENT)
Dept: PLASTIC SURGERY | Facility: CLINIC | Age: 49
End: 2024-09-23
Payer: COMMERCIAL

## 2024-09-23 DIAGNOSIS — Z98.890 OTHER SPECIFIED POSTPROCEDURAL STATES: ICD-10-CM

## 2024-09-23 PROCEDURE — 99213 OFFICE O/P EST LOW 20 MIN: CPT

## 2024-09-23 RX ORDER — DOXYCYCLINE HYCLATE 100 MG/1
100 TABLET ORAL
Qty: 3 | Refills: 2 | Status: ACTIVE | COMMUNITY
Start: 2024-09-23 | End: 1900-01-01

## 2024-09-23 NOTE — PHYSICAL EXAM
[de-identified] : well-appearing, NAD [de-identified] : Right breast: large pendulous breast with no palpable masses, nipple retraction or discharge. Well healed superior periareolar scar with minor contour depression at 9 o'clock. Left breast: implant soft and mobile, incisions along central breast and axillary roll healing well with no scar band.  No palpable masses

## 2024-09-23 NOTE — HISTORY OF PRESENT ILLNESS
[FreeTextEntry1] : 45 y/o F, G0, with PMH of left breast CA s/p left mastectomy and SLNBx with Dr. Mims ?2016, and HTN, who presents for breast reconstruction consultation. Pt first found the lesion on initial screening mammogram; has not done any mammograms prior. Now up to date with annual mammograms and US. Last mammogram April 2019- normal per pt (done at [facility]). No CTX or XRT. On Tamoxifen x5 years. Reports chronic skin irritation to bilateral IMFs but denies neck/back or shoulder pain. Had a right breast I&D vs lumpectomy done- ?abscess; pt reports "bacteria", no malignancy.   Ht 5'5" Wt 190lb; has been losing weight with diet Current bra size: unknown - happy with current size +FHx: sister- DCIS, dx at age 48; cousin, dx at age 40 Oncologist: Landon Denies hx of VTE or MRSA infections Occ: [employer]  Interval hx (11/22/19): Pt presents for f/u.  She has lost weight and her clothes fit better per pt.  She now weight in the 180s (lbs).  Pt feels ready to proceed with left breast reconstruction with TE/Alloderm.  Father is present for the discussion.  Pt weighed today; 187 lbs.  Interval hx (3/11/20): Pt presents for first postoperative visit- POD #8 s/p delayed left breast reconstruction with TE/alloderm. Denies f/c, pain, CP/SOB, or calf pain. Completed PO abx. Drain output: 38/34/60.  Interval hx (3/18/20):  Here for 2nd post-op visit POD#15 s/p delayed left breast reconstruction with TE/alldoerm. Drains 38/32/10.  Denies fevers, chills, redness, swelling.  complaint with lifting restrictions  Interval hx (3/23/20): Here for serial TE fill.  Pt tolerated last fill without issue.   Denies fevers, chills, redness, swelling. c/w lifting restrictions  Interval hx (4/1/20). Patient presents today POD#29 for f/u and serial TE fill. Doing well and reporting no new complaints. Tolerating expansion without any issues.   Interval hx (4/8/20): Pt presents for POD #36 visit and serial TE fill. Reports last fill over 80cc was slightly more painful and less well tolerated as compared to 60cc. Otherwise feeling well.  Interval hx (4/15/20): Pt presents for POD #43 visit and serial TE fill. Feeling well and tolerated last fill.  Interval hx (4/22/20): Pt presents for POD #50 visit and serial TE fill. Feeling well and tolerated last fill.  Interval hx (4/29/20): Pt presents for POD #57 visit and serial TE fill. Feeling well and tolerated last fill.  Interval hx (5/7/20): Pt presents for POD #64 visit and serial TE fill. Feeling well and tolerated last fill.  Interval hx (7/15/20): Here for f/u POD #7 s/p left TE exchange to permanent silicone implant and excision of left axillary roll. Denies pain, f/c, or drainage. Happy with results. Feels left breast is still slightly smaller than right.  Interval hx (7/21/20): Here for f/u POD #13 s/p left TE exchange to permanent silicone implant and excision of left axillary roll  Interval hx (8/21/20)  6 weeks s/p left TE exchange to silicone implant.  No complaints.  She is to return to work 8/27 and needs work release.  Interval hx (10/2/20): 3 months s/p left TE exchange to silicone implant.  No complaints.  No issues with return to work.  Interval hx (10/2/20): 6 months s/p left TE exchange to silicone implant.  No complaints. Working without issues.  Pt very happy with left breast appearance.   Not interested in right breast reduction for symmetry.  Interval hx (7/7/21): 1 year s/p left TE exchange to silicone implant.  No complaints.  pt very happy with results of left breast recon.  Had right mammogram 2 weeks ago; benign results.  Interval hx (8/29/22): 2 years s/p  left TE exchange to silicone implant.  No complaints.  pt very happy with results of left breast recon.   Recent right mammogram benign - [facility] center with Dr. Navarrete.  Interval hx (9/23/23): 3 years s/p left TE exchange to silicone implant. No complaints. Happy with results.  Interval hx (9/23/24): Pt is here 4 years s/p left TE exchange to silicone implant. No complaints. Doing well and happy with results.

## 2024-09-23 NOTE — HISTORY OF PRESENT ILLNESS
Patient is calling with symptoms of lower abdominal pain that is worse when she urinates. She also has some pain in her back along the bra line. She has been experiencing strong smelling urine and cloudy urine for a couple months. She states that she is drinking a lot of fluids and staying well hydrated. The back and abdominal pain started approximately a week ago.    Patient also had questions on how to get in touch with Dallas Barreto PsyD that she saw at the [facility] in March. She has not heard back regarding a test that she took and further follow up or treatment advise. She is doing okay at this time but wants to follow up and continue with any appropriate treatment. Phone numbers to contact Dr. Barreto given to patient since she couldn't find a way to contact him in My Chart.    Additional Information    Negative: Shock suspected (e.g., cold/pale/clammy skin, too weak to stand, low BP, rapid pulse)    Negative: Sounds like a life-threatening emergency to the triager    Negative: Followed a genital area injury    Negative: Followed a genital area injury (penis, scrotum)    Negative: Vaginal discharge    Negative: Pus (white, yellow) or bloody discharge from end of penis    Negative: [1] Taking antibiotic for urinary tract infection (UTI) AND [2] female    Negative: [1] Taking antibiotic for urinary tract infection (UTI) AND [2] male    Negative: [1] Discomfort (pain, burning or stinging) when passing urine AND [2] pregnant    Negative: [1] Discomfort (pain, burning or stinging) when passing urine AND [2] postpartum (from 0 to 6 weeks after delivery)    Negative: [1] Discomfort (pain, burning or stinging) when passing urine AND [2] female    Negative: [1] Discomfort (pain, burning or stinging) when passing urine AND [2] male    Negative: Pain or itching in the vulvar area    Negative: Pain in scrotum is main symptom    Negative: Blood in the urine is main symptom    Negative: Symptoms arising from use  "of a urinary catheter (Carrion or Coude)    Negative: [1] Unable to urinate (or only a few drops) > 4 hours AND     [2] bladder feels very full (e.g., palpable bladder or strong urge to urinate)    Negative: [1] Decreased urination and [2] drinking very little AND [2] dehydration suspected (e.g., dark urine, no urine > 12 hours, very dry mouth, very lightheaded)    Negative: Patient sounds very sick or weak to the triager    Negative: Fever > 100.5 F (38.1 C)    Bad or foul-smelling urine    Answer Assessment - Initial Assessment Questions  1. SYMPTOM: \"What's the main symptom you're concerned about?\" (e.g., frequency, incontinence)      Strong urine smell and cloudy urine  2. ONSET: \"When did the  cloudiness  start?\"      Couple months ago  3. PAIN: \"Is there any pain?\" If so, ask: \"How bad is it?\" (Scale: 1-10; mild, moderate, severe)      Back and abdominal pain for the past week  4. CAUSE: \"What do you think is causing the symptoms?\"      UTI  5. OTHER SYMPTOMS: \"Do you have any other symptoms?\" (e.g., fever, flank pain, blood in urine, pain with urination)      No fever, abdominal pain increases with urination  6. PREGNANCY: \"Is there any chance you are pregnant?\" \"When was your last menstrual period?\"      no    Protocols used: URINARY SYMPTOMS-GOPAL-    Alicia Saha RN on 7/22/2020 at 2:38 PM      " [FreeTextEntry1] : 43 y/o F, G0, with PMH of left breast CA s/p left mastectomy and SLNBx with Dr. Mims ?2016, and HTN, who presents for breast reconstruction consultation. Pt first found the lesion on initial screening mammogram; has not done any mammograms prior. Now up to date with annual mammograms and US. Last mammogram April 2019- normal per pt (done at [facility]). No CTX or XRT. On Tamoxifen x5 years. Reports chronic skin irritation to bilateral IMFs but denies neck/back or shoulder pain. Had a right breast I&D vs lumpectomy done- ?abscess; pt reports "bacteria", no malignancy.   Ht 5'5" Wt 190lb; has been losing weight with diet Current bra size: unknown - happy with current size +FHx: sister- DCIS, dx at age 48; cousin, dx at age 40 Oncologist: Landon Denies hx of VTE or MRSA infections Occ: [employer]  Interval hx (11/22/19): Pt presents for f/u.  She has lost weight and her clothes fit better per pt.  She now weight in the 180s (lbs).  Pt feels ready to proceed with left breast reconstruction with TE/Alloderm.  Father is present for the discussion.  Pt weighed today; 187 lbs.  Interval hx (3/11/20): Pt presents for first postoperative visit- POD #8 s/p delayed left breast reconstruction with TE/alloderm. Denies f/c, pain, CP/SOB, or calf pain. Completed PO abx. Drain output: 38/34/60.  Interval hx (3/18/20):  Here for 2nd post-op visit POD#15 s/p delayed left breast reconstruction with TE/alldoerm. Drains 38/32/10.  Denies fevers, chills, redness, swelling.  complaint with lifting restrictions  Interval hx (3/23/20): Here for serial TE fill.  Pt tolerated last fill without issue.   Denies fevers, chills, redness, swelling. c/w lifting restrictions  Interval hx (4/1/20). Patient presents today POD#29 for f/u and serial TE fill. Doing well and reporting no new complaints. Tolerating expansion without any issues.   Interval hx (4/8/20): Pt presents for POD #36 visit and serial TE fill. Reports last fill over 80cc was slightly more painful and less well tolerated as compared to 60cc. Otherwise feeling well.  Interval hx (4/15/20): Pt presents for POD #43 visit and serial TE fill. Feeling well and tolerated last fill.  Interval hx (4/22/20): Pt presents for POD #50 visit and serial TE fill. Feeling well and tolerated last fill.  Interval hx (4/29/20): Pt presents for POD #57 visit and serial TE fill. Feeling well and tolerated last fill.  Interval hx (5/7/20): Pt presents for POD #64 visit and serial TE fill. Feeling well and tolerated last fill.  Interval hx (7/15/20): Here for f/u POD #7 s/p left TE exchange to permanent silicone implant and excision of left axillary roll. Denies pain, f/c, or drainage. Happy with results. Feels left breast is still slightly smaller than right.  Interval hx (7/21/20): Here for f/u POD #13 s/p left TE exchange to permanent silicone implant and excision of left axillary roll  Interval hx (8/21/20)  6 weeks s/p left TE exchange to silicone implant.  No complaints.  She is to return to work 8/27 and needs work release.  Interval hx (10/2/20): 3 months s/p left TE exchange to silicone implant.  No complaints.  No issues with return to work.  Interval hx (10/2/20): 6 months s/p left TE exchange to silicone implant.  No complaints. Working without issues.  Pt very happy with left breast appearance.   Not interested in right breast reduction for symmetry.  Interval hx (7/7/21): 1 year s/p left TE exchange to silicone implant.  No complaints.  pt very happy with results of left breast recon.  Had right mammogram 2 weeks ago; benign results.  Interval hx (8/29/22): 2 years s/p  left TE exchange to silicone implant.  No complaints.  pt very happy with results of left breast recon.   Recent right mammogram benign - [facility] with Dr. Navarrete.  Interval hx (9/23/23): 3 years s/p left TE exchange to silicone implant. No complaints. Happy with results.  Interval hx (9/23/24): Pt is here 4 years s/p left TE exchange to silicone implant. No complaints. Doing well and happy with results.

## 2024-09-23 NOTE — ASSESSMENT
[FreeTextEntry1] : 49 y/o F with hx of left breast cancer s/p mastectomy, no XRT/CTX. s/p delayed left breast reconstruction with TE/AlloDerm in the process of expansion 3/3/20  Now 4 years s/p left TE exchange to permanent silicone implant and excision of left axillary roll, doing well Pt happy to reconstructive results.  No signs on capsular contracture or seroma.  -Pt knows to call for fevers, chills, redness, swelling -Rx renewed for PO abx for implant ppx for dental cleaning and other procedures. -all questions were answered -F/u 1 year for implant check and review of breast US

## 2024-09-23 NOTE — PHYSICAL EXAM
[de-identified] : well-appearing, NAD [de-identified] : Right breast: large pendulous breast with no palpable masses, nipple retraction or discharge. Well healed superior periareolar scar with minor contour depression at 9 o'clock. Left breast: implant soft and mobile, incisions along central breast and axillary roll healing well with no scar band.  No palpable masses

## 2025-03-11 ENCOUNTER — APPOINTMENT (OUTPATIENT)
Dept: OTOLARYNGOLOGY | Facility: CLINIC | Age: 50
End: 2025-03-11

## 2025-03-11 DIAGNOSIS — H93.8X3 OTHER SPECIFIED DISORDERS OF EAR, BILATERAL: ICD-10-CM

## 2025-03-11 DIAGNOSIS — H61.23 IMPACTED CERUMEN, BILATERAL: ICD-10-CM

## 2025-03-11 DIAGNOSIS — J34.89 OTHER SPECIFIED DISORDERS OF NOSE AND NASAL SINUSES: ICD-10-CM

## 2025-03-11 PROCEDURE — 99204 OFFICE O/P NEW MOD 45 MIN: CPT | Mod: 25

## 2025-03-11 PROCEDURE — 31231 NASAL ENDOSCOPY DX: CPT

## 2025-03-11 RX ORDER — ASPIRIN 81 MG
6.5 TABLET, DELAYED RELEASE (ENTERIC COATED) ORAL TWICE DAILY
Qty: 2 | Refills: 2 | Status: ACTIVE | COMMUNITY
Start: 2025-03-11 | End: 1900-01-01

## 2025-03-26 ENCOUNTER — APPOINTMENT (OUTPATIENT)
Dept: OTOLARYNGOLOGY | Facility: CLINIC | Age: 50
End: 2025-03-26
Payer: COMMERCIAL

## 2025-03-26 DIAGNOSIS — H61.22 IMPACTED CERUMEN, LEFT EAR: ICD-10-CM

## 2025-03-26 DIAGNOSIS — H90.12 CONDUCTIVE HEARING LOSS, UNILATERAL, LEFT EAR, WITH UNRESTRICTED HEARING ON THE CONTRALATERAL SIDE: ICD-10-CM

## 2025-03-26 PROCEDURE — 99213 OFFICE O/P EST LOW 20 MIN: CPT | Mod: 25

## 2025-03-26 PROCEDURE — 92550 TYMPANOMETRY & REFLEX THRESH: CPT | Mod: 52

## 2025-03-26 PROCEDURE — 92557 COMPREHENSIVE HEARING TEST: CPT

## 2025-05-13 ENCOUNTER — NON-APPOINTMENT (OUTPATIENT)
Age: 50
End: 2025-05-13

## 2025-05-13 ENCOUNTER — APPOINTMENT (OUTPATIENT)
Dept: CARDIOLOGY | Facility: CLINIC | Age: 50
End: 2025-05-13
Payer: COMMERCIAL

## 2025-05-13 VITALS
WEIGHT: 272 LBS | DIASTOLIC BLOOD PRESSURE: 82 MMHG | BODY MASS INDEX: 45.32 KG/M2 | SYSTOLIC BLOOD PRESSURE: 122 MMHG | HEIGHT: 65 IN

## 2025-05-13 DIAGNOSIS — Z98.890 OTHER SPECIFIED POSTPROCEDURAL STATES: ICD-10-CM

## 2025-05-13 DIAGNOSIS — J34.89 OTHER SPECIFIED DISORDERS OF NOSE AND NASAL SINUSES: ICD-10-CM

## 2025-05-13 DIAGNOSIS — G89.18 OTHER ACUTE POSTPROCEDURAL PAIN: ICD-10-CM

## 2025-05-13 DIAGNOSIS — E66.9 OBESITY, UNSPECIFIED: ICD-10-CM

## 2025-05-13 DIAGNOSIS — R94.31 ABNORMAL ELECTROCARDIOGRAM [ECG] [EKG]: ICD-10-CM

## 2025-05-13 DIAGNOSIS — H61.22 IMPACTED CERUMEN, LEFT EAR: ICD-10-CM

## 2025-05-13 PROCEDURE — 99214 OFFICE O/P EST MOD 30 MIN: CPT | Mod: 25

## 2025-05-13 PROCEDURE — 93000 ELECTROCARDIOGRAM COMPLETE: CPT

## 2025-05-27 ENCOUNTER — OUTPATIENT (OUTPATIENT)
Dept: OUTPATIENT SERVICES | Facility: HOSPITAL | Age: 50
LOS: 1 days | End: 2025-05-27
Payer: COMMERCIAL

## 2025-05-27 DIAGNOSIS — Z90.12 ACQUIRED ABSENCE OF LEFT BREAST AND NIPPLE: Chronic | ICD-10-CM

## 2025-05-27 DIAGNOSIS — Z98.890 OTHER SPECIFIED POSTPROCEDURAL STATES: Chronic | ICD-10-CM

## 2025-05-27 DIAGNOSIS — Z00.8 ENCOUNTER FOR OTHER GENERAL EXAMINATION: ICD-10-CM

## 2025-05-27 DIAGNOSIS — H90.12 CONDUCTIVE HEARING LOSS, UNILATERAL, LEFT EAR, WITH UNRESTRICTED HEARING ON THE CONTRALATERAL SIDE: ICD-10-CM

## 2025-05-27 PROCEDURE — 70481 CT ORBIT/EAR/FOSSA W/DYE: CPT

## 2025-05-27 PROCEDURE — 70481 CT ORBIT/EAR/FOSSA W/DYE: CPT | Mod: 26

## 2025-05-28 DIAGNOSIS — H90.12 CONDUCTIVE HEARING LOSS, UNILATERAL, LEFT EAR, WITH UNRESTRICTED HEARING ON THE CONTRALATERAL SIDE: ICD-10-CM

## 2025-06-24 ENCOUNTER — APPOINTMENT (OUTPATIENT)
Dept: OTOLARYNGOLOGY | Facility: CLINIC | Age: 50
End: 2025-06-24
Payer: COMMERCIAL

## 2025-06-24 VITALS — HEIGHT: 65 IN | WEIGHT: 272 LBS | BODY MASS INDEX: 45.32 KG/M2

## 2025-06-24 PROCEDURE — 99214 OFFICE O/P EST MOD 30 MIN: CPT
